# Patient Record
Sex: FEMALE | Race: WHITE | NOT HISPANIC OR LATINO | Employment: OTHER | URBAN - METROPOLITAN AREA
[De-identification: names, ages, dates, MRNs, and addresses within clinical notes are randomized per-mention and may not be internally consistent; named-entity substitution may affect disease eponyms.]

---

## 2017-02-14 RX ORDER — HYOSCYAMINE SULFATE 0.12 MG/1
TABLET, ORALLY DISINTEGRATING SUBLINGUAL
Qty: 270 TABLET | Refills: 2 | Status: SHIPPED | OUTPATIENT
Start: 2017-02-14 | End: 2017-11-12 | Stop reason: SDUPTHER

## 2017-03-08 ENCOUNTER — OFFICE VISIT (OUTPATIENT)
Dept: UROGYNECOLOGY | Facility: CLINIC | Age: 72
End: 2017-03-08
Payer: MEDICARE

## 2017-03-08 VITALS
HEART RATE: 92 BPM | DIASTOLIC BLOOD PRESSURE: 77 MMHG | BODY MASS INDEX: 49.14 KG/M2 | HEIGHT: 63 IN | WEIGHT: 277.31 LBS | SYSTOLIC BLOOD PRESSURE: 146 MMHG

## 2017-03-08 DIAGNOSIS — Z46.89 PESSARY MAINTENANCE: ICD-10-CM

## 2017-03-08 DIAGNOSIS — N81.11 MIDLINE CYSTOCELE: ICD-10-CM

## 2017-03-08 DIAGNOSIS — N30.10 IC (INTERSTITIAL CYSTITIS): Primary | ICD-10-CM

## 2017-03-08 DIAGNOSIS — N95.2 ATROPHIC VAGINITIS: ICD-10-CM

## 2017-03-08 LAB
BILIRUB SERPL-MCNC: NORMAL MG/DL
BLOOD URINE, POC: NORMAL
COLOR, POC UA: YELLOW
GLUCOSE UR QL STRIP: NORMAL
KETONES UR QL STRIP: NORMAL
LEUKOCYTE ESTERASE URINE, POC: NORMAL
NITRITE, POC UA: NORMAL
PH, POC UA: 5
PROTEIN, POC: NORMAL
SPECIFIC GRAVITY, POC UA: 1
UROBILINOGEN, POC UA: NORMAL

## 2017-03-08 PROCEDURE — 99213 OFFICE O/P EST LOW 20 MIN: CPT | Mod: S$PBB,,, | Performed by: NURSE PRACTITIONER

## 2017-03-08 PROCEDURE — 81002 URINALYSIS NONAUTO W/O SCOPE: CPT | Mod: PBBFAC,PO | Performed by: NURSE PRACTITIONER

## 2017-03-08 PROCEDURE — 99999 PR PBB SHADOW E&M-EST. PATIENT-LVL IV: CPT | Mod: PBBFAC,,, | Performed by: NURSE PRACTITIONER

## 2017-03-08 PROCEDURE — 99214 OFFICE O/P EST MOD 30 MIN: CPT | Mod: PBBFAC,PO | Performed by: NURSE PRACTITIONER

## 2017-03-08 RX ORDER — FUROSEMIDE 20 MG/1
20 TABLET ORAL
COMMUNITY
Start: 2017-02-06 | End: 2018-06-04

## 2017-03-08 NOTE — PROGRESS NOTES
Subjective:       Patient ID: Fransisca Frazier is a 71 y.o. female.    Chief Complaint: E-String    HPI  Fransisca Frazier is a 71 y.o. female who presents today for estring change.  She has done well the past few months.  She denies any real complaints/concerns at this time.  She feels that the estring is doing well for her.  She denies any real flares on her I.C.  Overall, she is happy with her status.    Review of Systems   Constitutional: Negative for activity change, fever and unexpected weight change.   HENT: Negative for hearing loss.    Eyes: Negative for visual disturbance.   Respiratory: Negative for shortness of breath and wheezing.    Cardiovascular: Negative for chest pain, palpitations and leg swelling.   Gastrointestinal: Negative for abdominal pain, constipation and diarrhea.   Genitourinary: Negative for dyspareunia, dysuria, frequency, urgency, vaginal bleeding and vaginal discharge.   Musculoskeletal: Negative for gait problem and neck pain.   Skin: Negative for rash and wound.   Allergic/Immunologic: Negative for immunocompromised state.   Neurological: Negative for tremors, speech difficulty and weakness.   Hematological: Does not bruise/bleed easily.   Psychiatric/Behavioral: Negative for agitation and confusion.       Objective:      Physical Exam   Constitutional: She is oriented to person, place, and time. She appears well-developed and well-nourished. No distress.   HENT:   Head: Normocephalic and atraumatic.   Neck: Neck supple. No thyromegaly present.   Pulmonary/Chest: Effort normal. No respiratory distress.   Abdominal: Soft. There is no tenderness. No hernia.   Musculoskeletal: Normal range of motion.   Neurological: She is alert and oriented to person, place, and time.   Skin: Skin is warm and dry. No rash noted.   Psychiatric: She has a normal mood and affect. Her behavior is normal.     Pelvic Exam:  V: No lesions. No palpable nodes.   Va: no discharge or bleeding.  Good length.   Midline cystocele Ba=-1  Meatus:No caruncle or stenosis  Urethra: Non tender. No suburethral masses.  Cx/Cuff: Normal   Uterus: small, non-tender  Ad: No mass or tenderness.  Levators :Symmetrical. Normal tone. Non tender.  BL: Non tender  RV: No hemorrhoids.      Assessment:       1. IC (interstitial cystitis)    2. Midline cystocele    3. Atrophic vaginitis    4. Pessary maintenance          Procedure note- estring removed with forceps and discarded.  After betadine irrigation of the vagina, a new estring that the pt provided, was inserted into the vagina.  Pt ambulated in the room and denies any discomfort.  NP reminded pt that the first 24-48 hours are the most likely time for the pessary to fall out and to monitor the toilet before flushing.  Pt verbalized understanding.      Plan:       IC (interstitial cystitis)- monitor  -     POCT urine dipstick without microscope    Midline cystocele- continue with estring    Atrophic vaginitis- continue with estring    Pessary maintenance- continue with estring    RTC 3 months

## 2017-06-08 ENCOUNTER — OFFICE VISIT (OUTPATIENT)
Dept: UROGYNECOLOGY | Facility: CLINIC | Age: 72
End: 2017-06-08
Payer: MEDICARE

## 2017-06-08 VITALS
TEMPERATURE: 98 F | DIASTOLIC BLOOD PRESSURE: 89 MMHG | BODY MASS INDEX: 48.9 KG/M2 | WEIGHT: 276 LBS | HEART RATE: 89 BPM | SYSTOLIC BLOOD PRESSURE: 149 MMHG | HEIGHT: 63 IN

## 2017-06-08 DIAGNOSIS — Z46.89 PESSARY MAINTENANCE: ICD-10-CM

## 2017-06-08 DIAGNOSIS — N30.10 IC (INTERSTITIAL CYSTITIS): ICD-10-CM

## 2017-06-08 DIAGNOSIS — R35.0 URINARY FREQUENCY: Primary | ICD-10-CM

## 2017-06-08 DIAGNOSIS — N81.11 MIDLINE CYSTOCELE: ICD-10-CM

## 2017-06-08 DIAGNOSIS — N95.2 ATROPHIC VAGINITIS: ICD-10-CM

## 2017-06-08 LAB
BILIRUB SERPL-MCNC: NORMAL MG/DL
BLOOD URINE, POC: NORMAL
COLOR, POC UA: YELLOW
GLUCOSE UR QL STRIP: NORMAL
KETONES UR QL STRIP: NORMAL
LEUKOCYTE ESTERASE URINE, POC: NORMAL
NITRITE, POC UA: NORMAL
PH, POC UA: 6
PROTEIN, POC: NORMAL
SPECIFIC GRAVITY, POC UA: 1.01
UROBILINOGEN, POC UA: NORMAL

## 2017-06-08 PROCEDURE — 1159F MED LIST DOCD IN RCRD: CPT | Mod: ,,, | Performed by: NURSE PRACTITIONER

## 2017-06-08 PROCEDURE — 1125F AMNT PAIN NOTED PAIN PRSNT: CPT | Mod: ,,, | Performed by: NURSE PRACTITIONER

## 2017-06-08 PROCEDURE — 81002 URINALYSIS NONAUTO W/O SCOPE: CPT | Mod: PBBFAC,PO | Performed by: NURSE PRACTITIONER

## 2017-06-08 PROCEDURE — 99214 OFFICE O/P EST MOD 30 MIN: CPT | Mod: PBBFAC,PO | Performed by: NURSE PRACTITIONER

## 2017-06-08 PROCEDURE — 99213 OFFICE O/P EST LOW 20 MIN: CPT | Mod: S$PBB,,, | Performed by: NURSE PRACTITIONER

## 2017-06-08 PROCEDURE — 99999 PR PBB SHADOW E&M-EST. PATIENT-LVL IV: CPT | Mod: PBBFAC,,, | Performed by: NURSE PRACTITIONER

## 2017-06-08 RX ORDER — AMOXICILLIN AND CLAVULANATE POTASSIUM 875; 125 MG/1; MG/1
1 TABLET, FILM COATED ORAL 2 TIMES DAILY
Qty: 20 TABLET | Refills: 0 | Status: SHIPPED | OUTPATIENT
Start: 2017-06-08 | End: 2017-06-18

## 2017-06-08 RX ORDER — FLUCONAZOLE 150 MG/1
150 TABLET ORAL DAILY
Qty: 2 TABLET | Refills: 1 | Status: SHIPPED | OUTPATIENT
Start: 2017-06-08 | End: 2017-06-09

## 2017-06-08 NOTE — PROGRESS NOTES
Subjective:       Patient ID: Fransisca Frazier is a 71 y.o. female.    Chief Complaint: E string    HPI  Fransisca Frazier is a 71 y.o. female who presents today for estring change.  She feels that she is doing well with the estring.  She denies any vaginal discharge, bleeding or bulging.  She feels that her urinary symptoms are within normal limits and that she is doing well.  She is getting ready to go on vacation and she would like to have antibiotics with her because she states she always gets a UTI on vacation.  She is not able to clean herself as well and she would like when she is on vacation and she believes this is the cause of the UTI.  She denies any other complaints/concerns at this time.    Review of Systems   Constitutional: Negative for activity change, fever and unexpected weight change.   HENT: Negative for hearing loss.    Eyes: Negative for visual disturbance.   Respiratory: Negative for shortness of breath and wheezing.    Cardiovascular: Negative for chest pain, palpitations and leg swelling.   Gastrointestinal: Negative for abdominal pain, constipation and diarrhea.   Genitourinary: Negative for dyspareunia, dysuria, frequency, urgency, vaginal bleeding and vaginal discharge.   Musculoskeletal: Negative for gait problem and neck pain.   Skin: Negative for rash and wound.   Allergic/Immunologic: Negative for immunocompromised state.   Neurological: Negative for tremors, speech difficulty and weakness.   Hematological: Does not bruise/bleed easily.   Psychiatric/Behavioral: Negative for agitation and confusion.       Objective:      Physical Exam   Constitutional: She is oriented to person, place, and time. She appears well-developed and well-nourished. No distress.   HENT:   Head: Normocephalic and atraumatic.   Neck: Neck supple. No thyromegaly present.   Pulmonary/Chest: Effort normal. No respiratory distress.   Abdominal: Soft. There is no tenderness. No hernia.   Musculoskeletal: Normal range  of motion.   Neurological: She is alert and oriented to person, place, and time.   Skin: Skin is warm and dry. No rash noted.   Psychiatric: She has a normal mood and affect. Her behavior is normal.     Pelvic Exam:  V: No lesions. No palpable nodes.   Va: no discharge or bleeding.  Good length, midline cystocele Ba=-1  Meatus:No caruncle or stenosis  Urethra: Non tender. No suburethral masses.  Cx/Cuff: Normal   Uterus: small, non-tender  Ad: No mass or tenderness.  Levators :Symmetrical. Normal tone. Non tender.  BL: Non tender  RV: No hemorrhoids.      Assessment:       1. Urinary frequency    2. IC (interstitial cystitis)    3. Midline cystocele    4. Atrophic vaginitis    5. Pessary maintenance          Procedure note- estring pessary removed with forceps and discarded.  After betadine irrigation of the vagina, a new estring, which pt provided, was reinserted into the vagina.  Pt ambulated in the room and denies any discomfort.  NP reminded pt that the first 24-48 hours are the most likely time for the pessary to fall out and to monitor the toilet before flushing.  Pt verbalized understanding.      Plan:       Urinary frequency- we will send augmentin as noted below for prophylactic UTI for vacation.  Pt also requesting diflucan which was sent as noted below  -     POCT URINE DIPSTICK WITHOUT MICROSCOPE    IC (interstitial cystitis)- doing well at this time    Midline cystocele- continue with estring    Atrophic vaginitis- continue with estring    Pessary maintenance- continue with estring    Other orders  -     amoxicillin-clavulanate 875-125mg (AUGMENTIN) 875-125 mg per tablet; Take 1 tablet by mouth 2 (two) times daily.  Dispense: 20 tablet; Refill: 0  -     fluconazole (DIFLUCAN) 150 MG Tab; Take 1 tablet (150 mg total) by mouth once daily. May repeat in three days if no relief.  Dispense: 2 tablet; Refill: 1    RTC 3 month

## 2017-07-21 DIAGNOSIS — N30.10 INTERSTITIAL CYSTITIS: ICD-10-CM

## 2017-08-15 RX ORDER — PENTOSAN POLYSULFATE SODIUM 100 MG/1
CAPSULE, GELATIN COATED ORAL
Qty: 270 CAPSULE | Refills: 2 | Status: SHIPPED | OUTPATIENT
Start: 2017-08-15 | End: 2018-01-23 | Stop reason: SDUPTHER

## 2017-09-05 ENCOUNTER — TELEPHONE (OUTPATIENT)
Dept: UROGYNECOLOGY | Facility: CLINIC | Age: 72
End: 2017-09-05

## 2017-09-05 ENCOUNTER — OFFICE VISIT (OUTPATIENT)
Dept: UROGYNECOLOGY | Facility: CLINIC | Age: 72
End: 2017-09-05
Payer: MEDICARE

## 2017-09-05 VITALS
SYSTOLIC BLOOD PRESSURE: 180 MMHG | HEART RATE: 101 BPM | HEIGHT: 63 IN | WEIGHT: 276.69 LBS | BODY MASS INDEX: 49.02 KG/M2 | DIASTOLIC BLOOD PRESSURE: 86 MMHG | TEMPERATURE: 99 F

## 2017-09-05 DIAGNOSIS — Z87.440 HISTORY OF RECURRENT UTIS: ICD-10-CM

## 2017-09-05 DIAGNOSIS — N95.2 ATROPHIC VAGINITIS: ICD-10-CM

## 2017-09-05 DIAGNOSIS — R30.0 DYSURIA: ICD-10-CM

## 2017-09-05 DIAGNOSIS — N81.11 MIDLINE CYSTOCELE: Primary | ICD-10-CM

## 2017-09-05 LAB
BILIRUB SERPL-MCNC: ABNORMAL MG/DL
BLOOD URINE, POC: 250
COLOR, POC UA: YELLOW
GLUCOSE UR QL STRIP: ABNORMAL
KETONES UR QL STRIP: ABNORMAL
LEUKOCYTE ESTERASE URINE, POC: ABNORMAL
NITRITE, POC UA: ABNORMAL
PH, POC UA: 5
PROTEIN, POC: ABNORMAL
SPECIFIC GRAVITY, POC UA: 1.01
UROBILINOGEN, POC UA: ABNORMAL

## 2017-09-05 PROCEDURE — 1159F MED LIST DOCD IN RCRD: CPT | Mod: ,,, | Performed by: NURSE PRACTITIONER

## 2017-09-05 PROCEDURE — 99999 PR PBB SHADOW E&M-EST. PATIENT-LVL IV: CPT | Mod: PBBFAC,,, | Performed by: NURSE PRACTITIONER

## 2017-09-05 PROCEDURE — 1125F AMNT PAIN NOTED PAIN PRSNT: CPT | Mod: ,,, | Performed by: NURSE PRACTITIONER

## 2017-09-05 PROCEDURE — 99213 OFFICE O/P EST LOW 20 MIN: CPT | Mod: S$PBB,,, | Performed by: NURSE PRACTITIONER

## 2017-09-05 PROCEDURE — 81002 URINALYSIS NONAUTO W/O SCOPE: CPT | Mod: PBBFAC,PO | Performed by: NURSE PRACTITIONER

## 2017-09-05 PROCEDURE — 87086 URINE CULTURE/COLONY COUNT: CPT

## 2017-09-05 PROCEDURE — 87186 SC STD MICRODIL/AGAR DIL: CPT

## 2017-09-05 PROCEDURE — 87077 CULTURE AEROBIC IDENTIFY: CPT

## 2017-09-05 PROCEDURE — 87088 URINE BACTERIA CULTURE: CPT

## 2017-09-05 PROCEDURE — 99214 OFFICE O/P EST MOD 30 MIN: CPT | Mod: PBBFAC,PO | Performed by: NURSE PRACTITIONER

## 2017-09-05 RX ORDER — FLUCONAZOLE 150 MG/1
150 TABLET ORAL DAILY
Qty: 2 TABLET | Refills: 1 | Status: SHIPPED | OUTPATIENT
Start: 2017-09-05 | End: 2017-09-06

## 2017-09-05 RX ORDER — AMOXICILLIN AND CLAVULANATE POTASSIUM 875; 125 MG/1; MG/1
1 TABLET, FILM COATED ORAL 2 TIMES DAILY
Qty: 10 TABLET | Refills: 0 | Status: SHIPPED | OUTPATIENT
Start: 2017-09-05 | End: 2018-01-23 | Stop reason: ALTCHOICE

## 2017-09-05 NOTE — TELEPHONE ENCOUNTER
----- Message from Criss Hernandez sent at 9/5/2017  3:12 PM CDT -----  Contact: duc //[phone]//     Placed a call  To pod //  Pt is  At the  pharmacy ,  Calling  About  The  Amoxicillin  Clav//

## 2017-09-05 NOTE — TELEPHONE ENCOUNTER
Spoke with Jose at [facility] pharmacy who states medication has been picked up by patient already

## 2017-09-05 NOTE — PROGRESS NOTES
Subjective:       Patient ID: Fransisca Frazier is a 71 y.o. female.    Chief Complaint: E-string    HPI  Fransisca Frazier is a 71 y.o. female who presents today for estring change.  She has been using the estring for awhile and feels that it works fairly well for her.  She denies any vaginal complaints.  She does however, feel that she has a UTI.  Since last Wednesday she has been having a foul smell to her urine and her urine has been cloudy.  She has some dysuria and increased frequency and urgency.  She is also noticing some urge incontinence.  She has not been drinking much water today, but will work on increasing her water intake.  She denies any fevers, N/VD or new back pain.    Review of Systems   Constitutional: Negative for activity change, fever and unexpected weight change.   HENT: Negative for hearing loss.    Eyes: Negative for visual disturbance.   Respiratory: Negative for shortness of breath and wheezing.    Cardiovascular: Negative for chest pain, palpitations and leg swelling.   Gastrointestinal: Negative for abdominal pain, constipation and diarrhea.   Genitourinary: Positive for dysuria, frequency and urgency. Negative for dyspareunia, vaginal bleeding and vaginal discharge.   Musculoskeletal: Negative for gait problem and neck pain.   Skin: Negative for rash and wound.   Allergic/Immunologic: Negative for immunocompromised state.   Neurological: Negative for tremors, speech difficulty and weakness.   Hematological: Does not bruise/bleed easily.   Psychiatric/Behavioral: Negative for agitation and confusion.       Objective:      Physical Exam   Constitutional: She is oriented to person, place, and time. She appears well-developed and well-nourished. No distress.   HENT:   Head: Normocephalic and atraumatic.   Neck: Neck supple. No thyromegaly present.   Pulmonary/Chest: Effort normal. No respiratory distress.   Abdominal: Soft. There is no tenderness. There is no CVA tenderness. No hernia.    Musculoskeletal: Normal range of motion.   Neurological: She is alert and oriented to person, place, and time.   Skin: Skin is warm and dry. No rash noted.   Psychiatric: She has a normal mood and affect. Her behavior is normal.     Pelvic Exam:  V: No lesions. No palpable nodes.   Va: no discharge or bleeding.  Good length.  Mild midline cystocele Ba=-2  Meatus:No caruncle or stenosis  Urethra: Non tender. No suburethral masses.  Cx/Cuff: Normal   Uterus: small, non-tender  Ad: No mass or tenderness.  Levators :Symmetrical. Normal tone. Non tender.  BL: Non tender  RV: No hemorrhoids.      Assessment:       1. Midline cystocele    2. Atrophic vaginitis    3. Dysuria    4. History of recurrent UTIs          Procedure note- estring pessary removed and discarded.  After betadine irrigation of the vagina, a new estring pessary, which the pt provided was inserted into the vagina.  Pt ambulated in the room and denies any discomfort.  NP reminded pt that the first 24-48 hours are the most likely time for the pessary to fall out and to monitor the toilet before flushing.  Pt verbalized understanding.      Plan:       Midline cystocele- continue with estring    Atrophic vaginitis- continue with estring  -     estradiol (ESTRING) 2 mg (7.5 mcg /24 hour) vaginal ring; Place 2 mg vaginally every 3 (three) months.  Dispense: 1 each; Refill: 3    Dysuria- as noted below  -     POCT urine dipstick without microscope  -     Urine culture  -     amoxicillin-clavulanate 875-125mg (AUGMENTIN) 875-125 mg per tablet; Take 1 tablet by mouth 2 (two) times daily.  Dispense: 10 tablet; Refill: 0    History of recurrent UTIs- monitor  -     estradiol (ESTRING) 2 mg (7.5 mcg /24 hour) vaginal ring; Place 2 mg vaginally every 3 (three) months.  Dispense: 1 each; Refill: 3    Other orders- pt requesting for after antibiotic use  -     fluconazole (DIFLUCAN) 150 MG Tab; Take 1 tablet (150 mg total) by mouth once daily. May repeat in three  days if no relief.  Dispense: 2 tablet; Refill: 1    RTC 3 mo or PRN

## 2017-09-08 LAB — BACTERIA UR CULT: NORMAL

## 2017-11-14 RX ORDER — HYOSCYAMINE SULFATE 0.12 MG/1
TABLET, ORALLY DISINTEGRATING SUBLINGUAL
Qty: 270 TABLET | Refills: 2 | Status: SHIPPED | OUTPATIENT
Start: 2017-11-14 | End: 2018-06-04 | Stop reason: SDUPTHER

## 2017-12-07 ENCOUNTER — OFFICE VISIT (OUTPATIENT)
Dept: UROGYNECOLOGY | Facility: CLINIC | Age: 72
End: 2017-12-07
Payer: MEDICARE

## 2017-12-07 VITALS
DIASTOLIC BLOOD PRESSURE: 81 MMHG | WEIGHT: 273.56 LBS | BODY MASS INDEX: 48.47 KG/M2 | HEIGHT: 63 IN | SYSTOLIC BLOOD PRESSURE: 156 MMHG | RESPIRATION RATE: 18 BRPM | HEART RATE: 108 BPM | TEMPERATURE: 98 F

## 2017-12-07 DIAGNOSIS — R39.15 URINARY URGENCY: ICD-10-CM

## 2017-12-07 DIAGNOSIS — Z46.89 PESSARY MAINTENANCE: ICD-10-CM

## 2017-12-07 DIAGNOSIS — N95.2 ATROPHIC VAGINITIS: ICD-10-CM

## 2017-12-07 DIAGNOSIS — N81.11 CYSTOCELE, MIDLINE: ICD-10-CM

## 2017-12-07 DIAGNOSIS — R35.0 URINARY FREQUENCY: ICD-10-CM

## 2017-12-07 DIAGNOSIS — N30.10 IC (INTERSTITIAL CYSTITIS): ICD-10-CM

## 2017-12-07 DIAGNOSIS — R30.0 DYSURIA: Primary | ICD-10-CM

## 2017-12-07 LAB
BILIRUB SERPL-MCNC: ABNORMAL MG/DL
BLOOD URINE, POC: ABNORMAL
COLOR, POC UA: ABNORMAL
GLUCOSE UR QL STRIP: ABNORMAL
KETONES UR QL STRIP: ABNORMAL
LEUKOCYTE ESTERASE URINE, POC: ABNORMAL
NITRITE, POC UA: ABNORMAL
PH, POC UA: 5
PROTEIN, POC: ABNORMAL
SPECIFIC GRAVITY, POC UA: 1.01
UROBILINOGEN, POC UA: ABNORMAL

## 2017-12-07 PROCEDURE — 87186 SC STD MICRODIL/AGAR DIL: CPT

## 2017-12-07 PROCEDURE — 99215 OFFICE O/P EST HI 40 MIN: CPT | Mod: PBBFAC,PO | Performed by: NURSE PRACTITIONER

## 2017-12-07 PROCEDURE — 99999 PR PBB SHADOW E&M-EST. PATIENT-LVL V: CPT | Mod: PBBFAC,,, | Performed by: NURSE PRACTITIONER

## 2017-12-07 PROCEDURE — 81002 URINALYSIS NONAUTO W/O SCOPE: CPT | Mod: PBBFAC,PO | Performed by: NURSE PRACTITIONER

## 2017-12-07 PROCEDURE — 99213 OFFICE O/P EST LOW 20 MIN: CPT | Mod: S$PBB,,, | Performed by: NURSE PRACTITIONER

## 2017-12-07 PROCEDURE — 87086 URINE CULTURE/COLONY COUNT: CPT

## 2017-12-07 PROCEDURE — 87077 CULTURE AEROBIC IDENTIFY: CPT

## 2017-12-07 PROCEDURE — 87088 URINE BACTERIA CULTURE: CPT

## 2017-12-07 RX ORDER — VALSARTAN 160 MG/1
160 TABLET ORAL DAILY
COMMUNITY
Start: 2017-11-26 | End: 2018-06-04 | Stop reason: SDUPTHER

## 2017-12-07 RX ORDER — CIPROFLOXACIN 500 MG/1
500 TABLET ORAL 2 TIMES DAILY
Qty: 10 TABLET | Refills: 0 | Status: SHIPPED | OUTPATIENT
Start: 2017-12-07 | End: 2017-12-12

## 2017-12-07 RX ORDER — PANTOPRAZOLE SODIUM 40 MG/1
40 TABLET, DELAYED RELEASE ORAL DAILY
COMMUNITY
Start: 2017-11-03 | End: 2018-06-04 | Stop reason: SDUPTHER

## 2017-12-07 NOTE — PROGRESS NOTES
Subjective:       Patient ID: Fransisca Frazier is a 72 y.o. female.    Chief Complaint:estring    HPI  Fransisca Frazier is a 72 y.o. female who presents today for estring change.  She feels that she is doing well with the estring and would like to continue to use it.  She is concerned about her UTI's.  She was seen on 09/05/17 and had a positive urine culture for Klebsiella pneumonia.  She was treated and felt better.  She went to see her PCP and based on UA in the PCP's office was told she had another UTI and was treated again for UTI in October.  She felt that she was fine in November.  However, today she feels that she might have a UTI again.  She states that it started late Wednesday with burning, increased frequency/urgency and the urine has a horrible smell.  She denies any back pain, N/V/D, or fevers.  She does everything she can think of to help prevent these infections.  She washes her hands before wiping after urinating.  She uses a separate bar of soap in the shower only for the vaginal area.  She even wears gloves in the shower to wash her buttocks and then removes the gloves before washing anywhere else.  She is not sure what else can be done about the infections.    Review of Systems   Constitutional: Negative for activity change, fever and unexpected weight change.   HENT: Negative for hearing loss.    Eyes: Negative for visual disturbance.   Respiratory: Negative for shortness of breath and wheezing.    Cardiovascular: Negative for chest pain, palpitations and leg swelling.   Gastrointestinal: Positive for abdominal pain. Negative for constipation and diarrhea.   Genitourinary: Positive for dysuria, frequency and urgency. Negative for dyspareunia, vaginal bleeding and vaginal discharge.   Musculoskeletal: Negative for gait problem and neck pain.   Skin: Negative for rash and wound.   Allergic/Immunologic: Negative for immunocompromised state.   Neurological: Negative for tremors, speech difficulty and  weakness.   Hematological: Does not bruise/bleed easily.   Psychiatric/Behavioral: Negative for agitation and confusion.       Objective:      Physical Exam   Constitutional: She is oriented to person, place, and time. She appears well-developed and well-nourished. No distress.   HENT:   Head: Normocephalic and atraumatic.   Neck: Neck supple. No thyromegaly present.   Pulmonary/Chest: Effort normal. No respiratory distress.   Abdominal: Soft. There is tenderness in the suprapubic area. There is no CVA tenderness. No hernia.   Musculoskeletal: Normal range of motion.   Neurological: She is alert and oriented to person, place, and time.   Skin: Skin is warm and dry. No rash noted.   Psychiatric: She has a normal mood and affect. Her behavior is normal.     Pelvic Exam:  V: No lesions. No palpable nodes.   Va: no discharge or bleeding.  Good length.  Midline cystocele Ba=-1  Meatus:No caruncle or stenosis  Urethra: Non tender. No suburethral masses.  Cx/Cuff: Normal   Uterus: small, non-tender  Ad: No mass or tenderness.  Levators :Symmetrical. Normal tone. Non tender.  BL: Non tender  RV: No hemorrhoids.      Assessment:       1. Dysuria    2. Urinary frequency    3. Urinary urgency    4. IC (interstitial cystitis)    5. Cystocele, midline    6. Pessary maintenance    7. Atrophic vaginitis          Procedure note- estring pessary removed with forceps and discarded.  After betadine irrigation of the vagina, a new estring, which the pt provided was reinserted into the vagina.  Pt ambulated in the room and denies any discomfort.  NP reminded pt that the first 24-48 hours are the most likely time for the pessary to fall out and to monitor the toilet before flushing.  Pt verbalized understanding.      Plan:       Dysuria- when the urine culture results come back we will evaluate what antibiotic would be a good choice for prophylactic antibiotic therapy.  -     POCT URINE DIPSTICK WITHOUT MICROSCOPE  -     Urine culture  -      ciprofloxacin HCl (CIPRO) 500 MG tablet; Take 1 tablet (500 mg total) by mouth 2 (two) times daily.  Dispense: 10 tablet; Refill: 0    Urinary frequency- monitor    Urinary urgency- monitor    IC (interstitial cystitis)- monitor    Cystocele, midline- continue with estring    Pessary maintenance- continue estring    Atrophic vaginitis- continue estring    rTC 3 months

## 2017-12-11 LAB — BACTERIA UR CULT: NORMAL

## 2017-12-12 DIAGNOSIS — R30.0 DYSURIA: Primary | ICD-10-CM

## 2017-12-12 RX ORDER — SULFAMETHOXAZOLE AND TRIMETHOPRIM 400; 80 MG/1; MG/1
1 TABLET ORAL DAILY
Qty: 30 TABLET | Refills: 1 | Status: SHIPPED | OUTPATIENT
Start: 2017-12-12 | End: 2018-02-06

## 2017-12-12 RX ORDER — FLUCONAZOLE 150 MG/1
150 TABLET ORAL DAILY
Qty: 2 TABLET | Refills: 1 | Status: SHIPPED | OUTPATIENT
Start: 2017-12-12 | End: 2017-12-13

## 2017-12-12 RX ORDER — PHENAZOPYRIDINE HYDROCHLORIDE 200 MG/1
200 TABLET, FILM COATED ORAL 3 TIMES DAILY PRN
Qty: 90 TABLET | Refills: 1 | Status: SHIPPED | OUTPATIENT
Start: 2017-12-12 | End: 2018-01-11

## 2017-12-12 NOTE — TELEPHONE ENCOUNTER
I will send in the pyridium.  I will also order low dose bactrim for her to take daily for 30 days.  Does she want me to [facility] a local [facility]?   I would like for her to repeat the urine culture so that we know the infection has cleared.  I have also put an order in for repeat urine culture.  She can go to the lab and drop off a specimen at her convenience.

## 2017-12-12 NOTE — TELEPHONE ENCOUNTER
Pt is also requesting a rx  For diflucan as she gets yeast infections when she takes abx. Please advise  Pt is requesting all meds be sent to local pharmacy. Please advise.

## 2017-12-12 NOTE — TELEPHONE ENCOUNTER
Pt advised of results. Pt states that she is still not feeling well. Pt is requesting pyridium be sent to her pharmacy to help with the still there burning. Pt states that she is a little better, but she is still not well.

## 2017-12-12 NOTE — TELEPHONE ENCOUNTER
----- Message from CONCHA Diane sent at 12/12/2017  8:21 AM CST -----  Her urine culture came back positive for Klebsiella pneumoniae.  It is sensitive to the cipro that we started at the visit.  How is she feeling?  If she is feeling better I would recommend that we do prophylactic antibiotic therapy with trimethoprim for at least a month.

## 2018-01-22 PROBLEM — K21.9 GASTROESOPHAGEAL REFLUX DISEASE WITHOUT ESOPHAGITIS: Status: ACTIVE | Noted: 2018-01-22

## 2018-01-22 PROBLEM — E83.52 HYPERCALCEMIA: Status: ACTIVE | Noted: 2018-01-22

## 2018-01-22 PROBLEM — M85.89 OSTEOPENIA OF MULTIPLE SITES: Status: ACTIVE | Noted: 2018-01-22

## 2018-01-23 ENCOUNTER — OFFICE VISIT (OUTPATIENT)
Dept: INTERNAL MEDICINE | Facility: CLINIC | Age: 73
End: 2018-01-23
Payer: MEDICARE

## 2018-01-23 VITALS
SYSTOLIC BLOOD PRESSURE: 140 MMHG | TEMPERATURE: 97 F | HEART RATE: 78 BPM | RESPIRATION RATE: 20 BRPM | OXYGEN SATURATION: 97 % | HEIGHT: 63 IN | DIASTOLIC BLOOD PRESSURE: 66 MMHG | WEIGHT: 276 LBS | BODY MASS INDEX: 48.9 KG/M2

## 2018-01-23 DIAGNOSIS — J40 BRONCHITIS: ICD-10-CM

## 2018-01-23 DIAGNOSIS — E83.52 HYPERCALCEMIA: ICD-10-CM

## 2018-01-23 DIAGNOSIS — K21.9 GASTROESOPHAGEAL REFLUX DISEASE WITHOUT ESOPHAGITIS: ICD-10-CM

## 2018-01-23 DIAGNOSIS — N39.0 UTI DUE TO KLEBSIELLA SPECIES: ICD-10-CM

## 2018-01-23 DIAGNOSIS — M85.89 OSTEOPENIA OF MULTIPLE SITES: ICD-10-CM

## 2018-01-23 DIAGNOSIS — B96.89 UTI DUE TO KLEBSIELLA SPECIES: ICD-10-CM

## 2018-01-23 DIAGNOSIS — N30.10 INTERSTITIAL CYSTITIS: ICD-10-CM

## 2018-01-23 DIAGNOSIS — N18.30 CKD (CHRONIC KIDNEY DISEASE) STAGE 3, GFR 30-59 ML/MIN: Primary | ICD-10-CM

## 2018-01-23 PROCEDURE — 99214 OFFICE O/P EST MOD 30 MIN: CPT | Mod: ,,, | Performed by: INTERNAL MEDICINE

## 2018-01-23 RX ORDER — AMITRIPTYLINE HYDROCHLORIDE 25 MG/1
25 TABLET, FILM COATED ORAL 3 TIMES DAILY
Qty: 270 TABLET | Refills: 1 | Status: SHIPPED | OUTPATIENT
Start: 2018-01-23 | End: 2018-02-06 | Stop reason: SDUPTHER

## 2018-01-23 RX ORDER — DOXYCYCLINE 100 MG/1
100 CAPSULE ORAL 2 TIMES DAILY
Qty: 20 CAPSULE | Refills: 0 | Status: SHIPPED | OUTPATIENT
Start: 2018-01-23 | End: 2018-02-09

## 2018-01-23 RX ORDER — BENZONATATE 100 MG/1
100 CAPSULE ORAL 3 TIMES DAILY PRN
Qty: 30 CAPSULE | Refills: 0 | Status: SHIPPED | OUTPATIENT
Start: 2018-01-23 | End: 2018-01-25 | Stop reason: SDUPTHER

## 2018-01-23 NOTE — PROGRESS NOTES
SUBJECTIVE:    Patient ID: Fransisca Frazier is a 72 y.o. female.    Chief Complaint: Follow-up (2 months)    HPI     BEGINNING OF THE FOURTH WEEK OF A COLD--HAD FLU SHOT ON A FRIDAY AND THREE DAYS LATER SHE AWAKENED WITH CHEST CONGESTION, FEVER .2-MINIMAL PRODUCTIVE COUGH -CONTINUES WITH THE COUGH-TODAY HAS BEEN A GOOD DAY BUT THE COUGH HAS CONTINUED AND SOUNDS CROUPY-COUGHING MAKES HER LIGHT-HEADED-SAYS THREE TIMES SHE HAD BRONCHOSPASMS AND COULD HARDLY BREATH    BLOOD SUGARS--GOOD MINIMAL ELEVATION    BP'S ACCEPTABLE        Past Medical History:   Diagnosis Date    Cystitis, interstitial     Hypertension      Social History     Social History    Marital status:      Spouse name: N/A    Number of children: N/A    Years of education: N/A     Occupational History    Not on file.     Social History Main Topics    Smoking status: Former Smoker     Packs/day: 1.00     Years: 32.00     Quit date: 8/12/1993    Smokeless tobacco: Never Used    Alcohol use Yes      Comment: occasional    Drug use: No    Sexual activity: Not on file     Other Topics Concern    Not on file     Social History Narrative    No narrative on file     Past Surgical History:   Procedure Laterality Date    CYSTOSCOPY      D & C      DILATION AND CURETTAGE OF UTERUS       Family History   Problem Relation Age of Onset    Cancer Father     Cancer Sister     Cancer Brother     Cancer Brother        Review of Systems   Constitutional: Positive for fever (WITH RTI). Negative for appetite change, chills, diaphoresis, fatigue and unexpected weight change.   HENT: Negative for congestion, ear pain, hearing loss, nosebleeds, postnasal drip, sinus pain, sinus pressure, sneezing, sore throat, tinnitus, trouble swallowing and voice change.         EAR CONGESTION ON THE RIGHT   Eyes: Negative for photophobia, pain, itching and visual disturbance.   Respiratory: Positive for cough. Negative for apnea, chest tightness, wheezing and  stridor.    Cardiovascular: Negative for chest pain, palpitations and leg swelling.   Gastrointestinal: Negative for abdominal distention, abdominal pain, blood in stool, constipation, diarrhea, nausea and vomiting.        FETTUCINI LED TO INDIGESTION AND RUQ PAIN-GB DYSFUNCTION   Endocrine: Negative for cold intolerance, heat intolerance, polydipsia and polyuria.   Genitourinary: Positive for pelvic pain. Negative for difficulty urinating, dyspareunia, dysuria, flank pain, frequency, hematuria, menstrual problem, urgency, vaginal discharge and vaginal pain.        KLEBSIELLA UTI TWICE NOW-KNOWS SHE STILL HAS IT-ON MAINTENANCE DOSE OF BACTRIM   Musculoskeletal: Positive for back pain (SIGNIFICANT BACK PAIN-CANT WALK FAR-SEEMS THE BACK FREEZES UP AND SHE CANNOT WALK ANYMORE-HAS TO SIT IN A SHOWER CHAIR TO COMPLETE TAKING A SHOWER). Negative for arthralgias, joint swelling, myalgias, neck pain and neck stiffness.        HAS HAD A GROIN  SPASM WITH COUGHING   Skin: Negative for pallor.   Allergic/Immunologic: Negative for environmental allergies and food allergies.   Neurological: Negative for dizziness, tremors, speech difficulty, weakness, light-headedness and numbness.   Hematological: Does not bruise/bleed easily.   Psychiatric/Behavioral: Negative for agitation, confusion, decreased concentration, sleep disturbance and suicidal ideas. The patient is not nervous/anxious.           Objective:      Physical Exam   Constitutional: She is oriented to person, place, and time. She appears well-developed and well-nourished. She is cooperative. No distress.   overweight   HENT:   Head: Normocephalic and atraumatic.   Right Ear: Tympanic membrane normal.   Left Ear: Tympanic membrane normal.   Mouth/Throat: Uvula is midline and mucous membranes are normal.   Eyes: Conjunctivae, EOM and lids are normal. Pupils are equal, round, and reactive to light. Right pupil is round and reactive. Left pupil is round and reactive.    Neck: Trachea normal and normal range of motion. Neck supple. No JVD present. No thyromegaly present.   Cardiovascular: Normal rate, regular rhythm, normal heart sounds and intact distal pulses.    Pulmonary/Chest: Effort normal. No tachypnea. No respiratory distress.   Bilateral rhonchi all lung fields   Abdominal: Soft. Bowel sounds are normal. There is no tenderness.   Musculoskeletal: Normal range of motion.   Lymphadenopathy:     She has no cervical adenopathy.   Neurological: She is alert and oriented to person, place, and time. She has normal strength.   Skin: Skin is warm and dry. No rash noted.   Psychiatric: She has a normal mood and affect. Her speech is normal.   Nursing note and vitals reviewed.    Protective Sensation (w/ 10 gram monofilament):  Right: Intact  Left: Intact    Visual Inspection:  Normal -  Bilateral    Pedal Pulses:   Right: Present  Left: Present    Posterior tibialis:   Right:Present  Left: Present      Assessment:       1. CKD (chronic kidney disease) stage 3, GFR 30-59 ml/min    2. Gastroesophageal reflux disease without esophagitis    3. Uncontrolled type 2 diabetes mellitus without complication, without long-term current use of insulin    4. Hypercalcemia    5. Osteopenia of multiple sites    6. Interstitial cystitis    7. UTI due to Klebsiella species    8. Bronchitis         Plan:           CKD (chronic kidney disease) stage 3, GFR 30-59 ml/min    Gastroesophageal reflux disease without esophagitis    Uncontrolled type 2 diabetes mellitus without complication, without long-term current use of insulin  -     Hemoglobin A1c; Future; Expected date: 01/23/2018  -     Microalbumin/creatinine urine ratio  -     Basic metabolic panel; Future; Expected date: 01/23/2018    Hypercalcemia  -     PTH, intact; Future; Expected date: 01/23/2018    Osteopenia of multiple sites    Interstitial cystitis  -     amitriptyline (ELAVIL) 25 MG tablet; Take 1 tablet (25 mg total) by mouth 3 (three)  times daily.  Dispense: 270 tablet; Refill: 1  -     pentosan polysulfate (ELMIRON) 100 mg Cap; Take 1 capsule (100 mg total) by mouth 3 (three) times daily.  Dispense: 270 capsule; Refill: 1    UTI due to Klebsiella species    Bronchitis  -     benzonatate (TESSALON) 100 MG capsule; Take 1 capsule (100 mg total) by mouth 3 (three) times daily as needed for Cough.  Dispense: 30 capsule; Refill: 0  -     doxycycline (MONODOX) 100 MG capsule; Take 1 capsule (100 mg total) by mouth 2 (two) times daily.  Dispense: 20 capsule; Refill: 0  -     indacaterol-glycopyrrolate (UTIBRON NEOHALER) 27.5-15.6 mcg CpDv; Inhale 27.5 mcg into the lungs 2 (two) times daily. Controller  Dispense: 60 capsule; Refill: 0  -     X-Ray Chest PA And Lateral

## 2018-01-24 LAB
BUN SERPL-MCNC: 20 MG/DL (ref 8–20)
CALCIUM SERPL-MCNC: 9.6 MG/DL (ref 7.7–10.4)
CHLORIDE: 105 MMOL/L (ref 98–110)
CO2 SERPL-SCNC: 26.9 MMOL/L (ref 22.8–31.6)
CREATININE: 1.45 MG/DL (ref 0.6–1.4)
GLUCOSE: 109 MG/DL (ref 70–99)
HBA1C MFR BLD: 6 % (ref 3.1–6.5)
POTASSIUM SERPL-SCNC: 4.6 MMOL/L (ref 3.5–5)
SODIUM: 137 MMOL/L (ref 134–144)

## 2018-01-25 ENCOUNTER — TELEPHONE (OUTPATIENT)
Dept: INTERNAL MEDICINE | Facility: CLINIC | Age: 73
End: 2018-01-25

## 2018-01-25 DIAGNOSIS — J40 BRONCHITIS: ICD-10-CM

## 2018-01-25 RX ORDER — BENZONATATE 100 MG/1
100 CAPSULE ORAL 3 TIMES DAILY PRN
Qty: 30 CAPSULE | Refills: 0 | Status: SHIPPED | OUTPATIENT
Start: 2018-01-25 | End: 2018-03-07 | Stop reason: ALTCHOICE

## 2018-01-26 NOTE — TELEPHONE ENCOUNTER
----- Message from Ivet Rainey sent at 1/24/2018 10:01 AM CST -----  Contact: self  Pt had an appt on 1/23/18.  Benzonatate should have been sent to [facility] and the Elmiron should have been sent to [facility].  Mr. Frazier is very upset because his wife was coughing all night and now has to wait longer to get the cough meds.

## 2018-02-05 PROBLEM — N30.10 INTERSTITIAL CYSTITIS: Chronic | Status: ACTIVE | Noted: 2018-02-05

## 2018-02-05 PROBLEM — E78.5 HYPERLIPEMIA: Status: ACTIVE | Noted: 2018-02-05

## 2018-02-05 PROBLEM — N30.01 ACUTE CYSTITIS WITH HEMATURIA: Status: ACTIVE | Noted: 2018-02-05

## 2018-02-06 ENCOUNTER — OFFICE VISIT (OUTPATIENT)
Dept: INTERNAL MEDICINE | Facility: CLINIC | Age: 73
End: 2018-02-06
Payer: MEDICARE

## 2018-02-06 VITALS
SYSTOLIC BLOOD PRESSURE: 144 MMHG | BODY MASS INDEX: 47.13 KG/M2 | OXYGEN SATURATION: 97 % | HEIGHT: 63 IN | WEIGHT: 266 LBS | DIASTOLIC BLOOD PRESSURE: 70 MMHG | RESPIRATION RATE: 20 BRPM | TEMPERATURE: 98 F | HEART RATE: 84 BPM

## 2018-02-06 DIAGNOSIS — Z87.898 H/O RIGHT FLANK PAIN: ICD-10-CM

## 2018-02-06 DIAGNOSIS — K81.9 CHOLECYSTITIS: ICD-10-CM

## 2018-02-06 DIAGNOSIS — N18.30 CHRONIC RENAL DISEASE, STAGE 3, MODERATELY DECREASED GLOMERULAR FILTRATION RATE (GFR) BETWEEN 30-59 ML/MIN/1.73 SQUARE METER: ICD-10-CM

## 2018-02-06 DIAGNOSIS — K82.9 GALLBLADDER DISEASE: ICD-10-CM

## 2018-02-06 DIAGNOSIS — N30.01 ACUTE CYSTITIS WITH HEMATURIA: ICD-10-CM

## 2018-02-06 DIAGNOSIS — N39.0 URINARY TRACT INFECTION WITH HEMATURIA, SITE UNSPECIFIED: ICD-10-CM

## 2018-02-06 DIAGNOSIS — N30.10 INTERSTITIAL CYSTITIS: Chronic | ICD-10-CM

## 2018-02-06 DIAGNOSIS — R31.9 URINARY TRACT INFECTION WITH HEMATURIA, SITE UNSPECIFIED: ICD-10-CM

## 2018-02-06 LAB
ALBUMIN SERPL-MCNC: 4.1 G/DL (ref 3.1–4.7)
ALP SERPL-CCNC: 48 IU/L (ref 40–104)
ALT (SGPT): 26 IU/L (ref 3–33)
AST SERPL-CCNC: 38 IU/L (ref 10–40)
BASOPHILS NFR BLD: 0 K/UL (ref 0–0.2)
BASOPHILS NFR BLD: 0.3 %
BILIRUB SERPL-MCNC: 0.6 MG/DL (ref 0.3–1)
BILIRUBIN DIRECT+TOT PNL SERPL-MCNC: 0.2 MG/DL (ref 0–0.2)
BUN SERPL-MCNC: 25 MG/DL (ref 8–20)
CALCIUM SERPL-MCNC: 10.2 MG/DL (ref 7.7–10.4)
CHLORIDE: 98 MMOL/L (ref 98–110)
CO2 SERPL-SCNC: 23.1 MMOL/L (ref 22.8–31.6)
CREATININE: 1.56 MG/DL (ref 0.6–1.4)
EOSINOPHIL NFR BLD: 0.2 K/UL (ref 0–0.7)
EOSINOPHIL NFR BLD: 2.6 %
ERYTHROCYTE [DISTWIDTH] IN BLOOD BY AUTOMATED COUNT: 13.2 % (ref 11.7–14.9)
GLUCOSE: 110 MG/DL (ref 70–99)
GRAN #: 4.2 K/UL (ref 1.4–6.5)
GRAN%: 58 %
HCT VFR BLD AUTO: 40.2 % (ref 36–48)
HGB BLD-MCNC: 13.3 G/DL (ref 12–15)
IMMATURE GRANS (ABS): 0 K/UL (ref 0–1)
IMMATURE GRANULOCYTES: 0.3 %
LYMPH #: 1.4 K/UL (ref 1.2–3.4)
LYMPH%: 19 %
MCH RBC QN AUTO: 32.7 PG (ref 25–35)
MCHC RBC AUTO-ENTMCNC: 33.1 G/DL (ref 31–36)
MCV RBC AUTO: 98.8 FL (ref 79–98)
MONO #: 1.4 K/UL (ref 0.1–0.6)
MONO%: 19.8 %
NUCLEATED RBCS: 0 %
PLATELET # BLD AUTO: 250 K/UL (ref 140–440)
PMV BLD AUTO: 11.1 FL (ref 8.8–12.7)
POTASSIUM SERPL-SCNC: 3.9 MMOL/L (ref 3.5–5)
PROT SERPL-MCNC: 8 G/DL (ref 6–8.2)
RBC # BLD AUTO: 4.07 M/UL (ref 3.5–5.5)
SODIUM: 132 MMOL/L (ref 134–144)
WBC # BLD AUTO: 7.2 K/UL (ref 5–10)

## 2018-02-06 PROCEDURE — 1159F MED LIST DOCD IN RCRD: CPT | Mod: ,,, | Performed by: INTERNAL MEDICINE

## 2018-02-06 PROCEDURE — 1170F FXNL STATUS ASSESSED: CPT | Mod: ,,, | Performed by: INTERNAL MEDICINE

## 2018-02-06 PROCEDURE — 1125F AMNT PAIN NOTED PAIN PRSNT: CPT | Mod: ,,, | Performed by: INTERNAL MEDICINE

## 2018-02-06 PROCEDURE — 99214 OFFICE O/P EST MOD 30 MIN: CPT | Mod: ,,, | Performed by: INTERNAL MEDICINE

## 2018-02-06 RX ORDER — AMITRIPTYLINE HYDROCHLORIDE 25 MG/1
25 TABLET, FILM COATED ORAL 3 TIMES DAILY
Qty: 270 TABLET | Refills: 1 | Status: SHIPPED | OUTPATIENT
Start: 2018-02-06 | End: 2018-06-04 | Stop reason: SDUPTHER

## 2018-02-06 NOTE — PROGRESS NOTES
SUBJECTIVE:    Patient ID: Fransisca Frazier is a 72 y.o. female.    Chief Complaint: Follow-up (2 week)    HPI    IN FOR RECHECK    URI-IMPROVED    GFR-35 CC-SO WE HAVE TO ADJUST HER RX AND HAVE HER SEE RENAL    CHRONIC GB DISEASE-SHE IS READY FOR HER GB SURGERY-SIGNIFICANT PAIN RUQ WITH RADIATION TO THE BACK-NO NAUSEA-NO DIARRHEA-IN FACT SHE IS CONSTIPATED-SORENESS REMAINS IN THE LATERAL RIGHT LOWER CHEST-NO FEVER SINCE THE FLU-    CYSTITIS IS FLARED AT THIS TIME  Past Medical History:   Diagnosis Date    Cystitis, interstitial     Hypertension      Social History     Social History    Marital status:      Spouse name: N/A    Number of children: N/A    Years of education: N/A     Occupational History    Not on file.     Social History Main Topics    Smoking status: Former Smoker     Packs/day: 1.00     Years: 32.00     Quit date: 8/12/1993    Smokeless tobacco: Never Used    Alcohol use Yes      Comment: occasional    Drug use: No    Sexual activity: Not on file     Other Topics Concern    Not on file     Social History Narrative    No narrative on file     Past Surgical History:   Procedure Laterality Date    CYSTOSCOPY      D & C      DILATION AND CURETTAGE OF UTERUS       Family History   Problem Relation Age of Onset    Cancer Father     Cancer Sister     Cancer Brother     Cancer Brother        Review of Systems   Constitutional: Positive for appetite change (MODIFIED DUE TO GB DIET). Negative for chills, diaphoresis, fatigue and fever.        WEIGHT LOSS WITH GB PRECAUTIONS   HENT: Negative for congestion, ear pain, hearing loss, nosebleeds, postnasal drip, sinus pain, sinus pressure, sneezing, sore throat, tinnitus, trouble swallowing and voice change.    Eyes: Negative for photophobia, pain, itching and visual disturbance.   Respiratory: Negative for apnea, cough (MINIMAL NOW WITH PRODUCTION), chest tightness, shortness of breath, wheezing and stridor.    Cardiovascular: Negative  for chest pain, palpitations and leg swelling.   Gastrointestinal: Negative for abdominal distention (RUQ), blood in stool, constipation, diarrhea, nausea and vomiting. Abdominal pain: RUQ.   Endocrine: Negative for cold intolerance, heat intolerance, polydipsia and polyuria.   Genitourinary: Negative for difficulty urinating, dyspareunia, dysuria, flank pain, frequency, hematuria, menstrual problem, pelvic pain, urgency, vaginal discharge and vaginal pain.        IC   Musculoskeletal: Negative for arthralgias, back pain, joint swelling, myalgias, neck pain and neck stiffness.        LEG CRAMPS   Skin: Negative for pallor.   Allergic/Immunologic: Negative for environmental allergies and food allergies.   Neurological: Negative for dizziness, tremors, speech difficulty, weakness, light-headedness and numbness.   Hematological: Does not bruise/bleed easily.   Psychiatric/Behavioral: Negative for agitation, confusion, decreased concentration, sleep disturbance and suicidal ideas. The patient is not nervous/anxious.           Objective:      Physical Exam   Constitutional: She is oriented to person, place, and time. She appears well-developed. She is cooperative. No distress.   overweight   HENT:   Head: Normocephalic and atraumatic.   Right Ear: Tympanic membrane normal.   Left Ear: Tympanic membrane normal.   Mouth/Throat: Uvula is midline and mucous membranes are normal.   Eyes: Conjunctivae, EOM and lids are normal. Pupils are equal, round, and reactive to light. Right pupil is round and reactive. Left pupil is round and reactive.   Neck: Trachea normal and normal range of motion. Neck supple. No JVD present. No thyromegaly present.   Cardiovascular: Normal rate, regular rhythm, normal heart sounds and intact distal pulses.    Pulmonary/Chest: Effort normal and breath sounds normal. No tachypnea. No respiratory distress.   Abdominal: Soft. Bowel sounds are normal. There is tenderness (RUQ WITH SOME REBOUND).    Musculoskeletal: Normal range of motion.   Lymphadenopathy:     She has no cervical adenopathy.   Neurological: She is alert and oriented to person, place, and time. She has normal strength.   Skin: Skin is warm and dry. No rash noted.   Psychiatric: She has a normal mood and affect. Her speech is normal.   Nursing note and vitals reviewed.          Assessment:       1. BMI 45.0-49.9, adult    2. Interstitial cystitis    3. Acute cystitis with hematuria    4. Gallbladder disease    5. Chronic renal disease, stage 3, moderately decreased glomerular filtration rate (GFR) between 30-59 mL/min/1.73 square meter    6. H/O right flank pain    7. Cholecystitis    8. Urinary tract infection with hematuria, site unspecified         Plan:           BMI 45.0-49.9, adult    Interstitial cystitis  -     amitriptyline (ELAVIL) 25 MG tablet; Take 1 tablet (25 mg total) by mouth 3 (three) times daily.  Dispense: 270 tablet; Refill: 1  -     pentosan polysulfate (ELMIRON) 100 mg Cap; Take 1 capsule (100 mg total) by mouth 3 (three) times daily.  Dispense: 270 capsule; Refill: 1  -     CBC auto differential; Future; Expected date: 02/06/2018    Acute cystitis with hematuria    Gallbladder disease  -     Ambulatory referral to General Surgery  -     CBC auto differential; Future; Expected date: 02/06/2018  -     Comprehensive metabolic panel; Future; Expected date: 02/06/2018  -     US Abdomen Complete; Future; Expected date: 02/06/2018    Chronic renal disease, stage 3, moderately decreased glomerular filtration rate (GFR) between 30-59 mL/min/1.73 square meter  -     Ambulatory referral to Nephrology    H/O right flank pain    Cholecystitis  -     Hepatic function panel; Future; Expected date: 02/06/2018    Urinary tract infection with hematuria, site unspecified  -     Urinalysis  -     Urine culture

## 2018-02-06 NOTE — PATIENT INSTRUCTIONS
Cut diuretics to qod/  Chronic Kidney Disease (CKD)     The role of the kidneys is to remove waste products and extra water from the blood.  When the kidneys do not work as they should, waste products begin to build up in the blood. This is called chronic kidney disease (CKD). CKD means that you have kidney damage or a decrease in kidney function lasting at least 3 months. CKD allows extra water, waste, and toxins to build up in the body. This can eventually become life-threatening. You might need dialysis or a kidney transplant to stay alive. This most severe form is called end stage renal disease.  Diabetes is the leading causes of chronic renal failure. Other causes include high blood pressure, hardening of the arteries (atherosclerosis), lupus, inflammation of the blood vessels (vasculitis), and past viral or bacterial infections. Certain over-the-counter pain medicines can cause renal failure when taken often over a long period of time. These include aspirin, ibuprofen, and related anti-inflammatory medicines called NSAIDs (nonsteroidal anti-inflammatory drugs).  Home care  The following guidelines will help you care for yourself at home:  · If you have diabetes, talk with your healthcare provider about keeping your blood sugar under control. Ask if you need to make and changes to your diet, lifestyle, or medicines.  · If you have high blood pressure:  ¨ Take prescribed medicine to lower your blood pressure to the recommended goal of less than 130/80.  ¨ Start a regular exercise program that you enjoy. Check with your healthcare provider to be sure your planned exercise program is right for you.  ¨ Eat less salt (sodium). Your healthcare provider can tell you how much salt per day is safe for you.  · If you are overweight, talk with your healthcare provider about a weight loss plan.  · If you smoke, you must quit. Smoking makes kidney disease worse. Talk with your healthcare provider about ways to help you  quit.  For more information, visit the following links:  ¨ www.smokefree.gov/sites/default/files/pdf/clearing-the-air-accessible.pdf  ¨ www.smokefree.gov  ¨ www.cancer.org/healthy/stayawayfromtobacco/guidetoquittingsmoking/  · Most people with CKD need to follow a special diet.  Be sure you understand yours. In general, you will need to limit protein, salt, potassium, and phosphorus. You also need to limit how much fluid you drink.   · CKD is a risk factor for heart disease. Talk with your healthcare provider about any other risk factors you might have and what you can do to lessen them.  · Talk with your healthcare provider about any medicines you are taking to find out if they need to be reduced or stopped.  · Don't use the following over-the-counter medicines, or consult your healthcare provider before using:  ¨ Aspirin and NSAIDs such as ibuprofen or naproxen. Using acetaminophen for fever or pain is OK.  ¨ Laxatives and antacids containing magnesium or aluminum  ¨ Fleet or phospho soda enemas containing phosphorus  ¨ Certain stomach acid-blocking medicine such as cimetidine or ranitidine   ¨ Decongestants containing pseudoephedrine   ¨ Herbal supplements  Follow-up care  Follow up with your healthcare provider, or as advised. Contact one of the following for more information:  · American Association of Kidney Patients 514-311-4615 www.aakp.org  · National Kidney Foundation 818-851-6914 www.kidney.org  · American Kidney Fund 562-388-9590 www.kidneyfund.org  · National Kidney Disease Education Program 866-4KIDNEY www.nkdep.nih.gov  If an X-ray, ECG (cardiogram), or other diagnostic test was taken, you will be told of any new findings that may affect your care.  Call 911  Call 911 if you have any of the following:  · Severe weakness, dizziness, fainting, drowsiness, or confusion  · Chest pain or shortness of breath  · Heart beating fast, slow, or irregularly  When to seek medical advice  Call your healthcare  provider right away if any of these occur:  · Nausea or vomiting  · Fever of 100.4°F (38°C) or higher, or as directed by your healthcare provider  · Unexpected weight gain or swelling in the legs, ankles, or around the eyes  · Decrease or absent urine output  Date Last Reviewed: 9/1/2016  © 3727-0744 [employer]. [address] [place] [address], [place] [address]. All rights reserved. This information is not intended as a substitute for professional medical care. Always follow your healthcare professional's instructions.        Diet for Chronic Kidney Disease  Following a special diet when you have kidney disease can help you stay as healthy as possible. Your healthcare provider or dietitian should make a special diet plan just for you.    Eating right  Here are some good eating rules to follow:  · Protein. Eating protein is important for your body. But too much protein can put a strain on your kidneys. Eating less protein may slow the progression of chronic kidney disease. Foods high in protein include meat, fish, eggs, cheese, and other dairy products. A registered dietitian can help you plan a diet that has the right amount of protein for you.  · Sodium. Having too much salt in your diet can make your body hold onto (retain) water. Ask your provider or dietitian how much sodium per day you are allowed. This will help you avoid fluid buildup in your body (fluid retention). It can also help control high blood pressure. Learn to read food labels to know how much sodium is in one serving. Foods high in salt include processed meats, canned and boxed foods, sauces, salted chips and snacks, pickled foods, frozen dinners, and restaurant and fast food.  · Fluids. If you have advanced kidney disease, you will need to limit the water and fluids you drink. If you dont, then too much water will build up in your body. The exact amount of fluid you can drink depends on how well your kidneys are working. Ask your provider  how much water you can safely drink each day.  · Potassium. In advanced kidney disease, your potassium level can go dangerously high. This affects your heart. It can cause an irregular heartbeat (arrhythmia). Ask your provider or dietitian if you should limit potassium in your diet. Foods high in potassium include dairy products (milk, yogurt, cheese), dried beans, bananas, oranges, potatoes, tomatoes, spinach, cantaloupe, honeydew melon, dried fruits, and nuts.   · Calcium. Calcium is important to build strong bones. But foods high in calcium are also high in phosphorus, which can take calcium from your bones. Limiting foods high in phosphorus will help keep calcium in your bones. Ask your provider how much calcium you should get each day.  · Phosphorus. In advanced kidney disease, your phosphorus level can go dangerously high. This affects many systems in the body and can damage your heart. Limit your intake of phosphorus-rich foods. These include dried beans and peas, nuts, peanut butter, cocoa, beer, cola drinks, and dairy products.  Date Last Reviewed: 8/1/2016  © 7215-9685 [employer]. [address] [place] [address], [place] [address]. All rights reserved. This information is not intended as a substitute for professional medical care. Always follow your healthcare professional's instructions.

## 2018-02-09 ENCOUNTER — HOSPITAL ENCOUNTER (OUTPATIENT)
Dept: PREADMISSION TESTING | Facility: HOSPITAL | Age: 73
Discharge: HOME | End: 2018-02-09
Attending: SURGERY
Payer: MEDICARE

## 2018-02-09 ENCOUNTER — HOSPITAL ENCOUNTER (OUTPATIENT)
Dept: RADIOLOGY | Facility: HOSPITAL | Age: 73
Discharge: HOME | End: 2018-02-09
Attending: SURGERY
Payer: MEDICARE

## 2018-02-09 VITALS — BODY MASS INDEX: 47.13 KG/M2 | WEIGHT: 266 LBS | HEIGHT: 63 IN

## 2018-02-09 DIAGNOSIS — R10.13 ABDOMINAL PAIN, EPIGASTRIC: Primary | ICD-10-CM

## 2018-02-09 LAB
BILIRUB UR QL STRIP: NEGATIVE
CLARITY UR: CLEAR
COLOR UR: YELLOW
GLUCOSE UR QL STRIP: NEGATIVE
HGB UR QL STRIP: NEGATIVE
KETONES UR QL STRIP: NEGATIVE
LEUKOCYTE ESTERASE UR QL STRIP: NEGATIVE
NITRITE UR QL STRIP: NEGATIVE
PH UR STRIP: 6 [PH] (ref 5–8)
PROT UR QL STRIP: NEGATIVE
SP GR UR STRIP: <=1.005 (ref 1–1.03)
URN SPEC COLLECT METH UR: ABNORMAL
UROBILINOGEN UR STRIP-ACNC: NEGATIVE EU/DL

## 2018-02-09 PROCEDURE — 99900103 DSU ONLY-NO CHARGE-INITIAL HR (STAT)

## 2018-02-09 PROCEDURE — 71046 X-RAY EXAM CHEST 2 VIEWS: CPT | Mod: 26,,, | Performed by: RADIOLOGY

## 2018-02-09 PROCEDURE — 93005 ELECTROCARDIOGRAM TRACING: CPT

## 2018-02-09 PROCEDURE — 93010 ELECTROCARDIOGRAM REPORT: CPT | Mod: ,,, | Performed by: INTERNAL MEDICINE

## 2018-02-09 PROCEDURE — 81003 URINALYSIS AUTO W/O SCOPE: CPT

## 2018-02-09 PROCEDURE — 71046 X-RAY EXAM CHEST 2 VIEWS: CPT | Mod: TC,FY

## 2018-02-15 ENCOUNTER — ANESTHESIA EVENT (OUTPATIENT)
Dept: SURGERY | Facility: HOSPITAL | Age: 73
End: 2018-02-15
Payer: MEDICARE

## 2018-02-16 ENCOUNTER — ANESTHESIA (OUTPATIENT)
Dept: SURGERY | Facility: HOSPITAL | Age: 73
End: 2018-02-16
Payer: MEDICARE

## 2018-02-16 ENCOUNTER — HOSPITAL ENCOUNTER (OUTPATIENT)
Facility: HOSPITAL | Age: 73
Discharge: HOME | End: 2018-02-16
Attending: SURGERY | Admitting: SURGERY
Payer: MEDICARE

## 2018-02-16 ENCOUNTER — SURGERY (OUTPATIENT)
Age: 73
End: 2018-02-16

## 2018-02-16 VITALS
SYSTOLIC BLOOD PRESSURE: 139 MMHG | TEMPERATURE: 98 F | DIASTOLIC BLOOD PRESSURE: 63 MMHG | BODY MASS INDEX: 47.13 KG/M2 | WEIGHT: 266 LBS | RESPIRATION RATE: 16 BRPM | OXYGEN SATURATION: 99 % | HEIGHT: 63 IN | HEART RATE: 84 BPM

## 2018-02-16 DIAGNOSIS — R10.13 ABDOMINAL DISCOMFORT, EPIGASTRIC: Primary | ICD-10-CM

## 2018-02-16 DIAGNOSIS — R10.13 ABDOMINAL PAIN, ACUTE, EPIGASTRIC: ICD-10-CM

## 2018-02-16 LAB
APTT BLDCRRT: 36.1 SEC
INR PPP: 1.1
PROTHROMBIN TIME: 10.8 SEC

## 2018-02-16 PROCEDURE — 36415 COLL VENOUS BLD VENIPUNCTURE: CPT

## 2018-02-16 PROCEDURE — 85610 PROTHROMBIN TIME: CPT

## 2018-02-16 PROCEDURE — 85730 THROMBOPLASTIN TIME PARTIAL: CPT

## 2018-02-16 PROCEDURE — 25000003 PHARM REV CODE 250: Performed by: ANESTHESIOLOGY

## 2018-02-16 RX ORDER — SODIUM CHLORIDE 0.9 % (FLUSH) 0.9 %
3 SYRINGE (ML) INJECTION
Status: CANCELLED | OUTPATIENT
Start: 2018-02-16

## 2018-02-16 RX ORDER — PROMETHAZINE HYDROCHLORIDE 12.5 MG/1
12.5 TABLET ORAL EVERY 6 HOURS PRN
Status: CANCELLED | OUTPATIENT
Start: 2018-02-16

## 2018-02-16 RX ORDER — SODIUM CHLORIDE 0.9 % (FLUSH) 0.9 %
3 SYRINGE (ML) INJECTION EVERY 8 HOURS
Status: DISCONTINUED | OUTPATIENT
Start: 2018-02-16 | End: 2018-02-16 | Stop reason: HOSPADM

## 2018-02-16 RX ORDER — MEPERIDINE HYDROCHLORIDE 25 MG/ML
12.5 INJECTION INTRAMUSCULAR; INTRAVENOUS; SUBCUTANEOUS ONCE AS NEEDED
Status: CANCELLED | OUTPATIENT
Start: 2018-02-16 | End: 2018-02-16

## 2018-02-16 RX ORDER — SODIUM CHLORIDE 0.9 % (FLUSH) 0.9 %
3 SYRINGE (ML) INJECTION EVERY 8 HOURS
Status: CANCELLED | OUTPATIENT
Start: 2018-02-16

## 2018-02-16 RX ORDER — DIPHENHYDRAMINE HYDROCHLORIDE 50 MG/ML
25 INJECTION INTRAMUSCULAR; INTRAVENOUS EVERY 6 HOURS PRN
Status: CANCELLED | OUTPATIENT
Start: 2018-02-16

## 2018-02-16 RX ORDER — SODIUM CHLORIDE, SODIUM LACTATE, POTASSIUM CHLORIDE, CALCIUM CHLORIDE 600; 310; 30; 20 MG/100ML; MG/100ML; MG/100ML; MG/100ML
10 INJECTION, SOLUTION INTRAVENOUS CONTINUOUS
Status: DISCONTINUED | OUTPATIENT
Start: 2018-02-17 | End: 2018-02-16 | Stop reason: HOSPADM

## 2018-02-16 RX ORDER — HYDROMORPHONE HYDROCHLORIDE 2 MG/ML
0.2 INJECTION, SOLUTION INTRAMUSCULAR; INTRAVENOUS; SUBCUTANEOUS EVERY 5 MIN PRN
Status: CANCELLED | OUTPATIENT
Start: 2018-02-16

## 2018-02-16 RX ORDER — OXYCODONE HYDROCHLORIDE 5 MG/1
5 TABLET ORAL
Status: CANCELLED | OUTPATIENT
Start: 2018-02-16

## 2018-02-16 RX ORDER — ONDANSETRON 2 MG/ML
4 INJECTION INTRAMUSCULAR; INTRAVENOUS DAILY PRN
Status: CANCELLED | OUTPATIENT
Start: 2018-02-16

## 2018-02-16 RX ORDER — LIDOCAINE HYDROCHLORIDE 10 MG/ML
1 INJECTION, SOLUTION EPIDURAL; INFILTRATION; INTRACAUDAL; PERINEURAL ONCE
Status: COMPLETED | OUTPATIENT
Start: 2018-02-16 | End: 2018-02-16

## 2018-02-16 RX ORDER — FENTANYL CITRATE 50 UG/ML
25 INJECTION, SOLUTION INTRAMUSCULAR; INTRAVENOUS EVERY 5 MIN PRN
Status: CANCELLED | OUTPATIENT
Start: 2018-02-16

## 2018-02-16 RX ADMIN — SODIUM CHLORIDE, SODIUM LACTATE, POTASSIUM CHLORIDE, AND CALCIUM CHLORIDE 10 ML/HR: .6; .31; .03; .02 INJECTION, SOLUTION INTRAVENOUS at 09:02

## 2018-02-16 RX ADMIN — LIDOCAINE HYDROCHLORIDE 10 MG: 10 INJECTION, SOLUTION EPIDURAL; INFILTRATION; INTRACAUDAL; PERINEURAL at 09:02

## 2018-02-16 NOTE — DISCHARGE INSTRUCTIONS
Per Dr Scott  Do not take the Elmiron for 5 days and Dr Scott will reschedule and will call her for further instructions

## 2018-02-16 NOTE — PLAN OF CARE
Pt stated that she left her dentures at home  Glasses are on and will be placed inher belonging bag    VSS    140/63

## 2018-02-16 NOTE — ANESTHESIA PREPROCEDURE EVALUATION
02/16/2018  Fransisca Frazier is a 72 y.o., female.    Anesthesia Evaluation    I have reviewed the Patient Summary Reports.    I have reviewed the Nursing Notes.   I have reviewed the Medications.     Review of Systems  Anesthesia Hx:  No problems with previous Anesthesia    Social:  Former Smoker    Cardiovascular:   Hypertension    Pulmonary:  Pulmonary Normal    Renal/:  Renal/ Normal     Hepatic/GI:   GERD, well controlled    Neurological:  Neurology Normal    Endocrine:   Diabetes (Pt not on medications), well controlled, type 2        Physical Exam  General:  Well nourished    Airway/Jaw/Neck:  Airway Findings: Mouth Opening: Normal Tongue: Normal  General Airway Assessment: Adult  Oropharynx Findings:  Mallampati: II  Jaw/Neck Findings:  Neck ROM: Normal ROM     Eyes/Ears/Nose:  Eyes/Ears/Nose Findings:    Dental:  Dental Findings:   Chest/Lungs:  Chest/Lungs Findings: Normal Respiratory Rate     Heart/Vascular:  Heart Findings: Rate: Normal  Rhythm: Regular Rhythm        Mental Status:  Mental Status Findings:  Cooperative, Alert and Oriented         Anesthesia Plan  Type of Anesthesia, risks & benefits discussed:  Anesthesia Type:  general  Patient's Preference:   Intra-op Monitoring Plan:   Intra-op Monitoring Plan Comments:   Post Op Pain Control Plan: multimodal analgesia  Post Op Pain Control Plan Comments:   Induction:   IV  Beta Blocker:  Patient is not currently on a Beta-Blocker (No further documentation required).       Informed Consent: Patient understands risks and agrees with Anesthesia plan.  Questions answered. Anesthesia consent signed with patient.  ASA Score: 3     Day of Surgery Review of History & Physical:  There are no significant changes.   H&P completed by Anesthesiologist.       Ready For Surgery From Anesthesia Perspective.

## 2018-02-16 NOTE — PLAN OF CARE
Labs ordered, called results to ?Dr Scott    PTT 36.1  PT  10.8  INR  1.1    Dr Scott discussed with the pt and her   The decision to cancel the case today and stay off the medicaion Elmiron for 5 days and they will reschedule per Dr Scott    The pt and her  verbalized understanding

## 2018-02-16 NOTE — PLAN OF CARE
Pt  at the bedside and the pt decided to give her  her glassses instead of leaving them in the belonging bag  Surgical consent signed and verified signatures

## 2018-02-20 ENCOUNTER — ANESTHESIA EVENT (OUTPATIENT)
Dept: SURGERY | Facility: HOSPITAL | Age: 73
End: 2018-02-20
Payer: MEDICARE

## 2018-02-21 ENCOUNTER — ANESTHESIA (OUTPATIENT)
Dept: SURGERY | Facility: HOSPITAL | Age: 73
End: 2018-02-21
Payer: MEDICARE

## 2018-02-21 ENCOUNTER — HOSPITAL ENCOUNTER (OUTPATIENT)
Facility: HOSPITAL | Age: 73
Discharge: HOME | End: 2018-02-21
Attending: SURGERY | Admitting: SURGERY
Payer: MEDICARE

## 2018-02-21 DIAGNOSIS — K21.9 GASTROESOPHAGEAL REFLUX DISEASE WITHOUT ESOPHAGITIS: Primary | ICD-10-CM

## 2018-02-21 DIAGNOSIS — R10.13 ABDOMINAL DISCOMFORT, EPIGASTRIC: ICD-10-CM

## 2018-02-21 DIAGNOSIS — N18.30 CHRONIC KIDNEY DISEASE, STAGE 3: ICD-10-CM

## 2018-02-21 LAB
APTT BLDCRRT: 35.5 SEC
INR PPP: 1.1
PROTHROMBIN TIME: 10.8 SEC

## 2018-02-21 PROCEDURE — 25000003 PHARM REV CODE 250: Performed by: NURSE ANESTHETIST, CERTIFIED REGISTERED

## 2018-02-21 PROCEDURE — 63600175 PHARM REV CODE 636 W HCPCS: Performed by: NURSE ANESTHETIST, CERTIFIED REGISTERED

## 2018-02-21 PROCEDURE — 25000003 PHARM REV CODE 250: Performed by: ANESTHESIOLOGY

## 2018-02-21 PROCEDURE — D9220A PRA ANESTHESIA: Mod: ANES,,, | Performed by: ANESTHESIOLOGY

## 2018-02-21 PROCEDURE — 85610 PROTHROMBIN TIME: CPT

## 2018-02-21 PROCEDURE — 63600175 PHARM REV CODE 636 W HCPCS: Performed by: ANESTHESIOLOGY

## 2018-02-21 PROCEDURE — 36000708 HC OR TIME LEV III 1ST 15 MIN: Performed by: SURGERY

## 2018-02-21 PROCEDURE — 71000016 HC POSTOP RECOV ADDL HR: Performed by: SURGERY

## 2018-02-21 PROCEDURE — 36415 COLL VENOUS BLD VENIPUNCTURE: CPT

## 2018-02-21 PROCEDURE — 36000709 HC OR TIME LEV III EA ADD 15 MIN: Performed by: SURGERY

## 2018-02-21 PROCEDURE — 71000015 HC POSTOP RECOV 1ST HR: Performed by: SURGERY

## 2018-02-21 PROCEDURE — 99900104 DSU ONLY-NO CHARGE-EA ADD'L HR (STAT): Performed by: SURGERY

## 2018-02-21 PROCEDURE — 71000039 HC RECOVERY, EACH ADD'L HOUR: Performed by: SURGERY

## 2018-02-21 PROCEDURE — D9220A PRA ANESTHESIA: Mod: CRNA,,, | Performed by: NURSE ANESTHETIST, CERTIFIED REGISTERED

## 2018-02-21 PROCEDURE — 27201423 OPTIME MED/SURG SUP & DEVICES STERILE SUPPLY: Performed by: SURGERY

## 2018-02-21 PROCEDURE — 99900103 DSU ONLY-NO CHARGE-INITIAL HR (STAT): Performed by: SURGERY

## 2018-02-21 PROCEDURE — 88304 TISSUE EXAM BY PATHOLOGIST: CPT | Performed by: PATHOLOGY

## 2018-02-21 PROCEDURE — 37000008 HC ANESTHESIA 1ST 15 MINUTES: Performed by: SURGERY

## 2018-02-21 PROCEDURE — 63600175 PHARM REV CODE 636 W HCPCS: Performed by: SURGERY

## 2018-02-21 PROCEDURE — 47562 LAPAROSCOPIC CHOLECYSTECTOMY: CPT | Mod: 80,,, | Performed by: SURGERY

## 2018-02-21 PROCEDURE — 37000009 HC ANESTHESIA EA ADD 15 MINS: Performed by: SURGERY

## 2018-02-21 PROCEDURE — 71000033 HC RECOVERY, INTIAL HOUR: Performed by: SURGERY

## 2018-02-21 PROCEDURE — 85730 THROMBOPLASTIN TIME PARTIAL: CPT

## 2018-02-21 RX ORDER — SODIUM CHLORIDE 0.9 % (FLUSH) 0.9 %
3 SYRINGE (ML) INJECTION
Status: DISCONTINUED | OUTPATIENT
Start: 2018-02-21 | End: 2018-02-21 | Stop reason: HOSPADM

## 2018-02-21 RX ORDER — SUCCINYLCHOLINE CHLORIDE 20 MG/ML
INJECTION INTRAMUSCULAR; INTRAVENOUS
Status: DISCONTINUED | OUTPATIENT
Start: 2018-02-21 | End: 2018-02-21

## 2018-02-21 RX ORDER — SODIUM CHLORIDE 0.9 % (FLUSH) 0.9 %
3 SYRINGE (ML) INJECTION
Status: DISCONTINUED | OUTPATIENT
Start: 2018-02-21 | End: 2018-02-21

## 2018-02-21 RX ORDER — ROCURONIUM BROMIDE 10 MG/ML
INJECTION, SOLUTION INTRAVENOUS
Status: DISCONTINUED | OUTPATIENT
Start: 2018-02-21 | End: 2018-02-21

## 2018-02-21 RX ORDER — NEOSTIGMINE METHYLSULFATE 1 MG/ML
INJECTION, SOLUTION INTRAVENOUS
Status: DISCONTINUED | OUTPATIENT
Start: 2018-02-21 | End: 2018-02-21

## 2018-02-21 RX ORDER — DEXAMETHASONE SODIUM PHOSPHATE 4 MG/ML
INJECTION, SOLUTION INTRA-ARTICULAR; INTRALESIONAL; INTRAMUSCULAR; INTRAVENOUS; SOFT TISSUE
Status: DISCONTINUED | OUTPATIENT
Start: 2018-02-21 | End: 2018-02-21

## 2018-02-21 RX ORDER — FENTANYL CITRATE 50 UG/ML
25 INJECTION, SOLUTION INTRAMUSCULAR; INTRAVENOUS EVERY 5 MIN PRN
Status: COMPLETED | OUTPATIENT
Start: 2018-02-21 | End: 2018-02-21

## 2018-02-21 RX ORDER — SODIUM CHLORIDE, SODIUM LACTATE, POTASSIUM CHLORIDE, CALCIUM CHLORIDE 600; 310; 30; 20 MG/100ML; MG/100ML; MG/100ML; MG/100ML
INJECTION, SOLUTION INTRAVENOUS CONTINUOUS
Status: DISCONTINUED | OUTPATIENT
Start: 2018-02-21 | End: 2018-02-21 | Stop reason: HOSPADM

## 2018-02-21 RX ORDER — HYDROMORPHONE HYDROCHLORIDE 2 MG/ML
0.2 INJECTION, SOLUTION INTRAMUSCULAR; INTRAVENOUS; SUBCUTANEOUS EVERY 5 MIN PRN
Status: DISCONTINUED | OUTPATIENT
Start: 2018-02-21 | End: 2018-02-21

## 2018-02-21 RX ORDER — PROMETHAZINE HYDROCHLORIDE 25 MG/ML
6.25 INJECTION, SOLUTION INTRAMUSCULAR; INTRAVENOUS ONCE
Status: COMPLETED | OUTPATIENT
Start: 2018-02-21 | End: 2018-02-21

## 2018-02-21 RX ORDER — ONDANSETRON HYDROCHLORIDE 2 MG/ML
INJECTION, SOLUTION INTRAMUSCULAR; INTRAVENOUS
Status: DISCONTINUED | OUTPATIENT
Start: 2018-02-21 | End: 2018-02-21

## 2018-02-21 RX ORDER — MEPERIDINE HYDROCHLORIDE 25 MG/ML
12.5 INJECTION INTRAMUSCULAR; INTRAVENOUS; SUBCUTANEOUS ONCE AS NEEDED
Status: DISCONTINUED | OUTPATIENT
Start: 2018-02-21 | End: 2018-02-21

## 2018-02-21 RX ORDER — LIDOCAINE HYDROCHLORIDE 10 MG/ML
5 INJECTION, SOLUTION EPIDURAL; INFILTRATION; INTRACAUDAL; PERINEURAL ONCE
Status: COMPLETED | OUTPATIENT
Start: 2018-02-21 | End: 2018-02-21

## 2018-02-21 RX ORDER — FENTANYL CITRATE 50 UG/ML
25 INJECTION, SOLUTION INTRAMUSCULAR; INTRAVENOUS EVERY 5 MIN PRN
Status: DISCONTINUED | OUTPATIENT
Start: 2018-02-21 | End: 2018-02-21

## 2018-02-21 RX ORDER — MEPERIDINE HYDROCHLORIDE 25 MG/ML
12.5 INJECTION INTRAMUSCULAR; INTRAVENOUS; SUBCUTANEOUS ONCE AS NEEDED
Status: DISCONTINUED | OUTPATIENT
Start: 2018-02-21 | End: 2018-02-21 | Stop reason: HOSPADM

## 2018-02-21 RX ORDER — PROPOFOL 10 MG/ML
VIAL (ML) INTRAVENOUS
Status: DISCONTINUED | OUTPATIENT
Start: 2018-02-21 | End: 2018-02-21

## 2018-02-21 RX ORDER — OXYCODONE HYDROCHLORIDE 5 MG/1
5 TABLET ORAL ONCE AS NEEDED
Status: COMPLETED | OUTPATIENT
Start: 2018-02-22 | End: 2018-02-21

## 2018-02-21 RX ORDER — HYDROMORPHONE HYDROCHLORIDE 2 MG/ML
0.2 INJECTION, SOLUTION INTRAMUSCULAR; INTRAVENOUS; SUBCUTANEOUS EVERY 5 MIN PRN
Status: DISCONTINUED | OUTPATIENT
Start: 2018-02-21 | End: 2018-02-21 | Stop reason: HOSPADM

## 2018-02-21 RX ORDER — PHENYLEPHRINE HYDROCHLORIDE 10 MG/ML
INJECTION INTRAVENOUS
Status: DISCONTINUED | OUTPATIENT
Start: 2018-02-21 | End: 2018-02-21

## 2018-02-21 RX ORDER — DIPHENHYDRAMINE HYDROCHLORIDE 50 MG/ML
25 INJECTION INTRAMUSCULAR; INTRAVENOUS EVERY 6 HOURS PRN
Status: DISCONTINUED | OUTPATIENT
Start: 2018-02-21 | End: 2018-02-21

## 2018-02-21 RX ORDER — DIPHENHYDRAMINE HYDROCHLORIDE 50 MG/ML
25 INJECTION INTRAMUSCULAR; INTRAVENOUS EVERY 6 HOURS PRN
Status: DISCONTINUED | OUTPATIENT
Start: 2018-02-21 | End: 2018-02-21 | Stop reason: HOSPADM

## 2018-02-21 RX ORDER — FENTANYL CITRATE 50 UG/ML
INJECTION, SOLUTION INTRAMUSCULAR; INTRAVENOUS
Status: DISCONTINUED | OUTPATIENT
Start: 2018-02-21 | End: 2018-02-21

## 2018-02-21 RX ORDER — SODIUM CHLORIDE, SODIUM LACTATE, POTASSIUM CHLORIDE, CALCIUM CHLORIDE 600; 310; 30; 20 MG/100ML; MG/100ML; MG/100ML; MG/100ML
75 INJECTION, SOLUTION INTRAVENOUS CONTINUOUS
Status: DISCONTINUED | OUTPATIENT
Start: 2018-02-21 | End: 2018-02-21

## 2018-02-21 RX ORDER — ONDANSETRON 2 MG/ML
4 INJECTION INTRAMUSCULAR; INTRAVENOUS ONCE
Status: DISCONTINUED | OUTPATIENT
Start: 2018-02-21 | End: 2018-02-21

## 2018-02-21 RX ORDER — SODIUM CHLORIDE, SODIUM LACTATE, POTASSIUM CHLORIDE, CALCIUM CHLORIDE 600; 310; 30; 20 MG/100ML; MG/100ML; MG/100ML; MG/100ML
75 INJECTION, SOLUTION INTRAVENOUS CONTINUOUS
Status: DISCONTINUED | OUTPATIENT
Start: 2018-02-21 | End: 2018-02-21 | Stop reason: HOSPADM

## 2018-02-21 RX ORDER — OXYCODONE HYDROCHLORIDE 5 MG/1
5 TABLET ORAL ONCE AS NEEDED
Status: DISCONTINUED | OUTPATIENT
Start: 2018-02-22 | End: 2018-02-21

## 2018-02-21 RX ORDER — LIDOCAINE HCL/PF 100 MG/5ML
SYRINGE (ML) INTRAVENOUS
Status: DISCONTINUED | OUTPATIENT
Start: 2018-02-21 | End: 2018-02-21

## 2018-02-21 RX ORDER — GLYCOPYRROLATE 0.2 MG/ML
INJECTION INTRAMUSCULAR; INTRAVENOUS
Status: DISCONTINUED | OUTPATIENT
Start: 2018-02-21 | End: 2018-02-21

## 2018-02-21 RX ORDER — ONDANSETRON 2 MG/ML
4 INJECTION INTRAMUSCULAR; INTRAVENOUS ONCE
Status: COMPLETED | OUTPATIENT
Start: 2018-02-21 | End: 2018-02-21

## 2018-02-21 RX ORDER — ACETAMINOPHEN 10 MG/ML
INJECTION, SOLUTION INTRAVENOUS
Status: DISCONTINUED | OUTPATIENT
Start: 2018-02-21 | End: 2018-02-21

## 2018-02-21 RX ADMIN — FENTANYL CITRATE 25 MCG: 50 INJECTION, SOLUTION INTRAMUSCULAR; INTRAVENOUS at 09:02

## 2018-02-21 RX ADMIN — PROMETHAZINE HYDROCHLORIDE 6.25 MG: 25 INJECTION INTRAMUSCULAR; INTRAVENOUS at 09:02

## 2018-02-21 RX ADMIN — PHENYLEPHRINE HYDROCHLORIDE 100 MCG: 10 INJECTION INTRAVENOUS at 07:02

## 2018-02-21 RX ADMIN — SODIUM CHLORIDE, SODIUM LACTATE, POTASSIUM CHLORIDE, AND CALCIUM CHLORIDE: .6; .31; .03; .02 INJECTION, SOLUTION INTRAVENOUS at 07:02

## 2018-02-21 RX ADMIN — ACETAMINOPHEN 750 MG: 10 INJECTION, SOLUTION INTRAVENOUS at 07:02

## 2018-02-21 RX ADMIN — ROCURONIUM BROMIDE 5 MG: 10 INJECTION, SOLUTION INTRAVENOUS at 07:02

## 2018-02-21 RX ADMIN — PROPOFOL 150 MG: 10 INJECTION, EMULSION INTRAVENOUS at 07:02

## 2018-02-21 RX ADMIN — HYDROMORPHONE HYDROCHLORIDE 0.2 MG: 2 INJECTION INTRAMUSCULAR; INTRAVENOUS; SUBCUTANEOUS at 09:02

## 2018-02-21 RX ADMIN — GLYCOPYRROLATE 0.6 MG: 0.2 INJECTION, SOLUTION INTRAMUSCULAR; INTRAVENOUS at 08:02

## 2018-02-21 RX ADMIN — ONDANSETRON 4 MG: 2 INJECTION INTRAMUSCULAR; INTRAVENOUS at 09:02

## 2018-02-21 RX ADMIN — PIPERACILLIN AND TAZOBACTAM 3.38 G: 3; .375 INJECTION, POWDER, LYOPHILIZED, FOR SOLUTION INTRAVENOUS; PARENTERAL at 07:02

## 2018-02-21 RX ADMIN — DEXAMETHASONE SODIUM PHOSPHATE 4 MG: 4 INJECTION, SOLUTION INTRAMUSCULAR; INTRAVENOUS at 07:02

## 2018-02-21 RX ADMIN — OXYCODONE HYDROCHLORIDE 5 MG: 5 TABLET ORAL at 09:02

## 2018-02-21 RX ADMIN — SODIUM CHLORIDE, SODIUM LACTATE, POTASSIUM CHLORIDE, AND CALCIUM CHLORIDE 75 ML/HR: .6; .31; .03; .02 INJECTION, SOLUTION INTRAVENOUS at 09:02

## 2018-02-21 RX ADMIN — LIDOCAINE HYDROCHLORIDE 5 MG: 10 INJECTION, SOLUTION EPIDURAL; INFILTRATION; INTRACAUDAL; PERINEURAL at 06:02

## 2018-02-21 RX ADMIN — ONDANSETRON 4 MG: 2 INJECTION, SOLUTION INTRAMUSCULAR; INTRAVENOUS at 07:02

## 2018-02-21 RX ADMIN — LIDOCAINE HYDROCHLORIDE 75 MG: 20 INJECTION, SOLUTION INTRAVENOUS at 07:02

## 2018-02-21 RX ADMIN — SUCCINYLCHOLINE CHLORIDE 120 MG: 20 INJECTION, SOLUTION INTRAMUSCULAR; INTRAVENOUS at 07:02

## 2018-02-21 RX ADMIN — ROCURONIUM BROMIDE 35 MG: 10 INJECTION, SOLUTION INTRAVENOUS at 07:02

## 2018-02-21 RX ADMIN — NEOSTIGMINE METHYLSULFATE 3.5 MG: 1 INJECTION INTRAVENOUS at 08:02

## 2018-02-21 RX ADMIN — FENTANYL CITRATE 100 MCG: 50 INJECTION, SOLUTION INTRAMUSCULAR; INTRAVENOUS at 07:02

## 2018-02-21 NOTE — PLAN OF CARE
Pre op assessment performed and questions answered.  Pt states she is anxious, reassurance provided

## 2018-02-21 NOTE — ANESTHESIA POSTPROCEDURE EVALUATION
"Anesthesia Post Evaluation    Patient: Fransisca Frazier    Procedure(s) Performed: Procedure(s) (LRB):  CHOLECYSTECTOMY-LAPAROSCOPIC (N/A)    Final Anesthesia Type: general  Patient location during evaluation: PACU  Patient participation: Yes- Able to Participate  Level of consciousness: awake and alert and oriented  Post-procedure vital signs: reviewed and stable  Pain management: adequate  Airway patency: patent  PONV status at discharge: No PONV  Anesthetic complications: no      Cardiovascular status: blood pressure returned to baseline  Respiratory status: unassisted, spontaneous ventilation and room air  Hydration status: euvolemic  Follow-up not needed.        Visit Vitals  /60   Pulse 85   Temp 36.7 °C (98 °F)   Resp 16   Ht 5' 3" (1.6 m)   Wt 120.7 kg (266 lb)   SpO2 (!) 91%   Breastfeeding? No   BMI 47.12 kg/m²       Pain/Marc Score: Pain Assessment Performed: Yes (2/21/2018  6:31 AM)  Presence of Pain: complains of pain/discomfort (2/21/2018 10:38 AM)  Pain Rating Prior to Med Admin: 6 (2/21/2018 10:00 AM)  Pain Rating Post Med Admin: 5 (2/21/2018 10:15 AM)  Marc Score: 10 (2/21/2018 10:38 AM)      "

## 2018-02-21 NOTE — OP NOTE
DATE OF PROCEDURE:  02/21/2018.    PREOPERATIVE DIAGNOSES:  1.  Cholelithiasis.  2.  Biliary colic.  3.  Morbid obesity.  4.  Chronic kidney disease, stage III.  5.  Non-insulin-dependent diabetes mellitus.  6.  History of peptic ulcer disease.  7.  Hypothyroidism.    POSTOPERATIVE DIAGNOSES:    PROCEDURE PERFORMED:  1.  Laparoscopic cholecystectomy.  2.  Intra-abdominal adhesiolysis.    SURGEON:  Lisseth Scott M.D.    FIRST ASSISTANT:  Ricardo Dillard M.D.    ANESTHESIA:  General endotracheal.    ESTIMATED BLOOD LOSS:  Approximately 20 mL    DRAINS:  None.    COMPLICATIONS:  None.    PROCEDURE IN DETAIL:  The patient was brought to the Operating Room where she   was placed on the operating room table in the supine position.  Following   general endotracheal anesthesia, the patient's abdomen was prepped and draped in   sterile fashion using chlorhexidine prep.  The patient had not had any previous   abdominal surgery.  A supraumbilical incision was made and carried down to the   subcutaneous tissue.  Bleeding was controlled by use of electrocautery.  The S   retractors were used to separate the subcutaneous tissue down to the fascia.    The patient had very thin fascia.  Stay sutures and 0 Vicryl were placed on the   superior and inferior fascial edges and with upward retraction, the peritoneum   was grasped and entered.  The Tigre trocar was then placed and the abdomen was   insufflated to approximately 4 liters.  The patient was placed in the reverse   Trendelenburg position and rotated to her left.  The lateral two 5-mm trocars   were placed under direct visualization with no bleeding.  The patient had a very   thick, very abundant amount of omentum that was covering in the liver, which   had to be pulled down to visualize the gallbladder.  The top of the gallbladder   was grasped and upward traction applied.  The fundus of the gallbladder was then   grasped and upward traction applied; however, with the thick  abundant omentum,   it kept falling into the wound and I could not see to do the surgery to get down   and clear the cystic duct and cystic artery, so a fan retractor was placed,   which held back the omentum.  Once this was done, the adhesions were taken down   from the anterior surface of the gallbladder using the electrocautery and the   dolphin nose dissector.  The cystic duct was then cleared down to the   bifurcation of the common bile duct.  It was then clipped twice distally, once   proximally and transected.  Following this, the artery, which we first saw, I   dissected up and this was the right hepatic artery.  It was found the patient   had a very short cystic artery coming off very close to the gallbladder and the   right hepatic.  This was clipped twice distally, once proximally and transected.    Once this was done, the gallbladder was taken down using first the dolphin   nose dissector and once I got up further on the gallbladder, the spatula and   electrocautery were then used.  Once the gallbladder was removed, it was placed   in an EndoCatch bag and brought out through the upper 10 mm trocar site, which   had been placed after positioning with the spinal needle.  I am inspecting the   liver bed.  She had a few areas that were oozing and these were coagulated using   the spatula.  Pressure was dropped to 7 in inspection and there was no bleeding   and no bile leakage.  Pressure was then returned to about 14 and the area was   irrigated and as much irrigation fluid as possible was suctioned out of her   abdomen.  I placed her back in the neutral position to hopefully get some of the   fluid that was down in the pelvis up and suctioned this out.  A piece of   Surgicel was then placed in the liver bed.  Following this, the trocars were   removed under direct visualization and no bleeding from any of the trocar sites.    The two 10 mm trocar sites, the fascia was closed with interrupted 0 Vicryl    sutures.  The skin of all 4 incisions was closed with 4-0 Monocryl subcuticular   sutures.  Sterile Band-Aids were placed over all 5 incisions.  The patient was   then awakened from general endotracheal anesthesia and taken to the Recovery   Room in stable condition.  All counts were reported as correct x2 prior to   closing the incisions.      ROSA MARIA  dd: 02/21/2018 09:20:49 (CST)  td: 02/21/2018 10:16:03 (CST)  Doc ID   #[id number]  Job ID #[id number]    CC:

## 2018-02-21 NOTE — TRANSFER OF CARE
"Anesthesia Transfer of Care Note    Patient: Fransisca Frazier    Procedure(s) Performed: Procedure(s) (LRB):  CHOLECYSTECTOMY-LAPAROSCOPIC (N/A)    Patient location: PACU    Anesthesia Type: general    Transport from OR: Transported from OR on 2-3 L/min O2 by NC with adequate spontaneous ventilation    Post pain: adequate analgesia    Post assessment: no apparent anesthetic complications and tolerated procedure well    Post vital signs: stable    Level of consciousness: awake, alert and oriented    Nausea/Vomiting: no nausea/vomiting    Complications: none    Transfer of care protocol was followed      Last vitals:   Visit Vitals  BP (!) 194/68 (BP Location: Left arm, Patient Position: Lying)   Pulse 102   Temp 36.6 °C (97.8 °F) (Temporal)   Resp 14   Ht 5' 3" (1.6 m)   Wt 120.7 kg (266 lb)   Breastfeeding? No   BMI 47.12 kg/m²     "

## 2018-02-21 NOTE — ANESTHESIA POSTPROCEDURE EVALUATION
"Anesthesia Post Evaluation    Patient: Fransisca Frazier    Procedure(s) Performed: Procedure(s) (LRB):  CHOLECYSTECTOMY-LAPAROSCOPIC (N/A)    [facility] Anesthesia Post Op Evaluation    Visit Vitals  BP (!) 194/68 (BP Location: Left arm, Patient Position: Lying)   Pulse 102   Temp 36.6 °C (97.8 °F) (Temporal)   Resp 14   Ht 5' 3" (1.6 m)   Wt 120.7 kg (266 lb)   Breastfeeding? No   BMI 47.12 kg/m²       Pain/Marc Score: Pain Assessment Performed: Yes (2/21/2018  6:31 AM)  Presence of Pain: denies (2/21/2018  6:31 AM)      "

## 2018-02-21 NOTE — PLAN OF CARE
"No emesis dr bernal released from pacu to phase 2  aware of pt  Vs intermit nausea pt insisited  To get pain pill oral  Given  ;  Had pain meds rates pain a 5/10 states its tolerable as long as I dont move" instr pt she will feel better getting rid of gas pains from procedure  Due to void ivf cont bandaids x 5 on abd dry intact   "

## 2018-02-21 NOTE — ANESTHESIA PREPROCEDURE EVALUATION
02/21/2018  Fransisca Frazier is a 72 y.o., female.    Anesthesia Evaluation    I have reviewed the Patient Summary Reports.    I have reviewed the Nursing Notes.   I have reviewed the Medications.     Review of Systems  Anesthesia Hx:  No problems with previous Anesthesia    Social:  Non-Smoker    Cardiovascular:   Hypertension    Hepatic/GI:   GERD    Endocrine:   Diabetes, type 2        Physical Exam  General:  Morbid Obesity    Airway/Jaw/Neck:  Airway Findings: Mouth Opening: Normal Tongue: Normal  General Airway Assessment: Adult, Good  Mallampati: II  Improves to II with phonation.  TM Distance: 4-6 cm      Dental:  Dental Findings: Edentulous   Chest/Lungs:  Chest/Lungs Findings: Clear to auscultation, Normal Respiratory Rate     Heart/Vascular:  Heart Findings: Rate: Normal  Rhythm: Regular Rhythm  Sounds: Normal  Heart murmur: negative       Mental Status:  Mental Status Findings:  Cooperative, Alert and Oriented         Anesthesia Plan  Type of Anesthesia, risks & benefits discussed:  Anesthesia Type:  general  Patient's Preference:   Intra-op Monitoring Plan: standard ASA monitors  Intra-op Monitoring Plan Comments:   Post Op Pain Control Plan:   Post Op Pain Control Plan Comments:   Induction:   IV  Beta Blocker:  Patient is not currently on a Beta-Blocker (No further documentation required).       Informed Consent: Patient understands risks and agrees with Anesthesia plan.  Questions answered. Anesthesia consent signed with patient.  ASA Score: 3     Day of Surgery Review of History & Physical: I have interviewed and examined the patient. I have reviewed the patient's H&P dated:  There are no significant changes.          Ready For Surgery From Anesthesia Perspective.

## 2018-02-21 NOTE — BRIEF OP NOTE
[facility]  Brief Operative Note     SUMMARY     Surgery Date: 2/21/2018     Surgeon(s) and Role:     * Ricardo Dillard MD - Assisting     * Lisseth Scott MD - Primary        Pre-op Diagnosis:  Disease of gallbladder [K82.9]    Post-op Diagnosis:  Post-Op Diagnosis Codes:     * Disease of gallbladder [K82.9]    Procedure(s) (LRB):  CHOLECYSTECTOMY-LAPAROSCOPIC (N/A)    Anesthesia: General    Description of the findings of the procedure: Very large amt of omentum--had to place Fan retractor to hold omentum back to visualize operative field    Findings/Key Components: AS ABOVE    Estimated Blood Loss: 5 mL         Specimens:   Specimen (12h ago through future)    Start     Ordered    02/21/18 0747  Specimen to Pathology - Surgery  Once     Comments:  Pre-op Diagnosis: Disease of gallbladder [K82.9]Post-op Diagnosis: sameProcedure(s):CHOLECYSTECTOMY-LAPAROSCOPIC Number of specimens: 1Name of specimens: gallbladder      02/21/18 0747          Discharge Note    SUMMARY     Admit Date: 2/21/2018    Discharge Date and Time:  02/21/2018 9:12 AM    Hospital Course (synopsis of major diagnoses, care, treatment, and services provided during the course of the hospital stay): To be discharged today once cleared by Anesth. No heavy lifting for 3 weeks     Final Diagnosis: Post-Op Diagnosis Codes:     * Disease of gallbladder [K82.9]    Disposition: Home or Self Care    Follow Up/Patient Instructions: 1). No heavy lifting [id number] Do not remove bandaides until Fri evening, may then shower    Medications:  Reconciled Home Medications:   Current Discharge Medication List      CONTINUE these medications which have NOT CHANGED    Details   amitriptyline (ELAVIL) 25 MG tablet Take 1 tablet (25 mg total) by mouth 3 (three) times daily.  Qty: 270 tablet, Refills: 1    Associated Diagnoses: Interstitial cystitis      benzonatate (TESSALON) 100 MG capsule Take 1 capsule (100 mg total) by mouth 3 (three) times daily  as needed for Cough.  Qty: 30 capsule, Refills: 0    Associated Diagnoses: Bronchitis      estradiol (ESTRING) 2 mg (7.5 mcg /24 hour) vaginal ring Place 2 mg vaginally every 3 (three) months.  Qty: 1 each, Refills: 3    Associated Diagnoses: History of recurrent UTIs; Atrophic vaginitis      ezetimibe (ZETIA) 10 mg tablet Take 10 mg by mouth every evening.      furosemide (LASIX) 20 MG tablet Take 20 mg by mouth as needed.       glucosamine-chondroitin 500-400 mg tablet Take 1 tablet by mouth once daily.      OSCIMIN SL 0.125 mg Subl DISSOLVE 1 TABLET UNDER THE TONGUE THREE TIMES A DAY AS NEEDED  Qty: 270 tablet, Refills: 2      pantoprazole (PROTONIX) 40 MG tablet 40 mg once daily.       pentosan polysulfate (ELMIRON) 100 mg Cap Take 1 capsule (100 mg total) by mouth 3 (three) times daily.  Qty: 270 capsule, Refills: 1    Associated Diagnoses: Interstitial cystitis      spironolactone (ALDACTONE) 100 MG tablet Take 100 mg by mouth as needed.   Refills: 0      valsartan (DIOVAN) 160 MG tablet Take 160 mg by mouth once daily.              Discharge Procedure Orders  Diet diabetic     Diet renal     Lifting restrictions   Order Comments: No heavy lifting for 3 weeks       Follow-up Information     Lisseth Scott MD.    Specialty:  General Surgery  Why:  Pt already has f/u apt in my office  Contact information:  [address]  [address]  [place] [id number] [phone]

## 2018-02-21 NOTE — PLAN OF CARE
Meets criteria for discharge. Discharged to home with . Denies nausea at present. Tolerating fluids. Pain tolerable. Steady gait with assist. Voiding without difficulty. Discharge instructions reviewed and printed handout given. Included information on renal and diabetic diet. Verbalized understanding.

## 2018-02-22 VITALS
HEART RATE: 83 BPM | TEMPERATURE: 98 F | RESPIRATION RATE: 18 BRPM | SYSTOLIC BLOOD PRESSURE: 141 MMHG | OXYGEN SATURATION: 94 % | WEIGHT: 266 LBS | BODY MASS INDEX: 47.13 KG/M2 | DIASTOLIC BLOOD PRESSURE: 66 MMHG | HEIGHT: 63 IN

## 2018-03-07 ENCOUNTER — OFFICE VISIT (OUTPATIENT)
Dept: UROGYNECOLOGY | Facility: CLINIC | Age: 73
End: 2018-03-07
Payer: MEDICARE

## 2018-03-07 VITALS
HEART RATE: 89 BPM | DIASTOLIC BLOOD PRESSURE: 86 MMHG | BODY MASS INDEX: 45.7 KG/M2 | TEMPERATURE: 98 F | HEIGHT: 63 IN | WEIGHT: 257.94 LBS | SYSTOLIC BLOOD PRESSURE: 177 MMHG

## 2018-03-07 DIAGNOSIS — N81.11 CYSTOCELE, MIDLINE: ICD-10-CM

## 2018-03-07 DIAGNOSIS — N30.10 IC (INTERSTITIAL CYSTITIS): ICD-10-CM

## 2018-03-07 DIAGNOSIS — N95.2 ATROPHIC VAGINITIS: ICD-10-CM

## 2018-03-07 DIAGNOSIS — R35.0 URINARY FREQUENCY: Primary | ICD-10-CM

## 2018-03-07 DIAGNOSIS — Z46.89 PESSARY MAINTENANCE: ICD-10-CM

## 2018-03-07 LAB
BILIRUB SERPL-MCNC: NORMAL MG/DL
BLOOD URINE, POC: NORMAL
COLOR, POC UA: NORMAL
GLUCOSE UR QL STRIP: NORMAL
KETONES UR QL STRIP: NORMAL
LEUKOCYTE ESTERASE URINE, POC: NORMAL
NITRITE, POC UA: NORMAL
PH, POC UA: 6
PROTEIN, POC: NORMAL
SPECIFIC GRAVITY, POC UA: 1.01
UROBILINOGEN, POC UA: NORMAL

## 2018-03-07 PROCEDURE — 87086 URINE CULTURE/COLONY COUNT: CPT

## 2018-03-07 PROCEDURE — 99213 OFFICE O/P EST LOW 20 MIN: CPT | Mod: S$PBB,,, | Performed by: NURSE PRACTITIONER

## 2018-03-07 PROCEDURE — 81002 URINALYSIS NONAUTO W/O SCOPE: CPT | Mod: PBBFAC,PO | Performed by: NURSE PRACTITIONER

## 2018-03-07 PROCEDURE — 99214 OFFICE O/P EST MOD 30 MIN: CPT | Mod: PBBFAC,PO | Performed by: NURSE PRACTITIONER

## 2018-03-07 PROCEDURE — 99999 PR PBB SHADOW E&M-EST. PATIENT-LVL IV: CPT | Mod: PBBFAC,,, | Performed by: NURSE PRACTITIONER

## 2018-03-07 RX ORDER — ESOMEPRAZOLE MAGNESIUM 40 MG/1
40 CAPSULE, DELAYED RELEASE ORAL
COMMUNITY
End: 2018-06-04

## 2018-03-07 RX ORDER — CIPROFLOXACIN 500 MG/1
500 TABLET ORAL 2 TIMES DAILY
Qty: 14 TABLET | Refills: 0 | Status: SHIPPED | OUTPATIENT
Start: 2018-03-07 | End: 2018-03-14

## 2018-03-07 NOTE — PROGRESS NOTES
Subjective:       Patient ID: Fransisca Frazier is a 72 y.o. female.    Chief Complaint: Follow-up (3 month f/u); Urinary Frequency; and Dysuria    HPI  Fransisca Frazier is a 72 y.o. female who presents today for estring change.  She has been doing fine with the estring and has not had any complaints in that regard.  She had gall bladder surgery on 02/21/18 and had to stop taking her elmiron.  She currently is having urinary frequency/urgency and some dysuria.  She is not sure if it is an I.C. Flare or if she has an infection.  She never took the daily prophylactic antibiotics because it didn't get ordered for her right away and then she had other health concerns that took priority.  If this urine comes back with infection, she would like to try the low dose antibiotics to see if this helps prevent recurrent infection.    Review of Systems   Constitutional: Negative for activity change, fever and unexpected weight change.   HENT: Negative for hearing loss.    Eyes: Negative for visual disturbance.   Respiratory: Negative for shortness of breath and wheezing.    Cardiovascular: Negative for chest pain, palpitations and leg swelling.   Gastrointestinal: Positive for abdominal pain. Negative for constipation and diarrhea.   Genitourinary: Positive for dysuria, frequency and urgency. Negative for dyspareunia, vaginal bleeding and vaginal discharge.   Musculoskeletal: Negative for gait problem and neck pain.   Skin: Negative for rash and wound.   Allergic/Immunologic: Negative for immunocompromised state.   Neurological: Negative for tremors, speech difficulty and weakness.   Hematological: Does not bruise/bleed easily.   Psychiatric/Behavioral: Negative for agitation and confusion.       Objective:      Physical Exam   Constitutional: She is oriented to person, place, and time. She appears well-developed and well-nourished. No distress.   HENT:   Head: Normocephalic and atraumatic.   Neck: Neck supple. No thyromegaly  present.   Pulmonary/Chest: Effort normal. No respiratory distress.   Abdominal: Soft. There is tenderness in the right upper quadrant. No hernia.   Surgical tenderness at times from cholecystectomy   Musculoskeletal: Normal range of motion.   Neurological: She is alert and oriented to person, place, and time.   Skin: Skin is warm and dry. No rash noted.   Psychiatric: She has a normal mood and affect. Her behavior is normal.     Pelvic Exam:  V: No lesions. No palpable nodes.   Va: no discharge or bleeding.  Good length.  Midline cystocele Ba=-1  Meatus:No caruncle or stenosis  Urethra: Non tender. No suburethral masses.  Cx/Cuff: Normal   Uterus: small, non-tender  Ad: No mass or tenderness.  Levators :Symmetrical. Normal tone. Non tender.  BL: Non tender  RV: No hemorrhoids.      Assessment:       1. Urinary frequency    2. Cystocele, midline    3. Pessary maintenance    4. Atrophic vaginitis    5. IC (interstitial cystitis)          Procedure note- estring pessary removed and discarded.  After betadine irrigation of the vagina, a new estring pessary, which patient provided,  was reinserted into the vagina.  Pt ambulated in the room and denies any discomfort.  NP reminded pt that the first 24-48 hours are the most likely time for the pessary to fall out and to monitor the toilet before flushing.  Pt verbalized understanding.      Plan:       Urinary frequency- we will send her urine for a culture.  I will send antibiotics as noted below, but pt will not take them until we get the results on the culture.  If her urine is positive, we will treat accordingly, but then begin low dose prophylaxis.  -     POCT URINE DIPSTICK WITHOUT MICROSCOPE  -     Urine culture  -     ciprofloxacin HCl (CIPRO) 500 MG tablet; Take 1 tablet (500 mg total) by mouth 2 (two) times daily.  Dispense: 14 tablet; Refill: 0    Cystocele, midline- continue with estring    Pessary maintenance- continue with estring    Atrophic vaginitis-  continue with estring    IC (interstitial cystitis)- pt has resumed elmiron, monitor    RTC 3 months

## 2018-03-08 LAB — BACTERIA UR CULT: NORMAL

## 2018-03-09 NOTE — PROGRESS NOTES
"Dictation #1  MRN:[id number]  CSN:[id number]  Progress Note    Admit Date: 2/16/2018   LOS: 0 days     SUBJECTIVE:     Follow-up For:  Pt admitted today for Lap. Estefany. She takes Elmiron for her Interstitial Cystitis. This med is a weak blood thinner. She did not have coag studies, so ordered them "STAT" while in preop. Pt states she hadn't taken the med for 2 days.    Scheduled Meds:  Continuous Infusions:  PRN Meds:    Review of patient's allergies indicates:  No Known Allergies    Review of Systems:  GI= 1). RUQ/epigastric pain;Biliary Colic 2). Low EF 2012; 16%. EF 2017=67%. +FFI      OBJECTIVE:     Vital Signs (Most Recent)  Temp: 97.9 °F (36.6 °C) (02/16/18 0850)  Pulse: 84 (02/16/18 1200)  Resp: 16 (02/16/18 1200)  BP: 139/63 (02/16/18 1200)  SpO2: 99 % (02/16/18 1200)    Vital Signs Range (Last 24H):       I & O (Last 24H):No intake or output data in the 24 hours ending 03/09/18 1048  Physical Exam:  No exam performed today, just did H&P in office for surgery    Laboratory:  Coagulation: PTT=36.1  INR=1.1    Diagnostic Results:SEE ABOVE    ASSESSMENT/PLAN:     Assessment: 1) Biliary Cholic 2) +FFI  3) Low E.F. 2012 (normal in 2017 ??)    Plan: Since pt's PTT was elevated and she is obese, I felt it better to cancel her surgery today; STOP the ELMIRON, and will reschedule her surgery in 5-7 days.            NO fried or greasy foods; and avoid lettuce    "

## 2018-06-04 ENCOUNTER — OFFICE VISIT (OUTPATIENT)
Dept: INTERNAL MEDICINE | Facility: CLINIC | Age: 73
End: 2018-06-04
Payer: MEDICARE

## 2018-06-04 VITALS
RESPIRATION RATE: 18 BRPM | BODY MASS INDEX: 45 KG/M2 | OXYGEN SATURATION: 95 % | DIASTOLIC BLOOD PRESSURE: 66 MMHG | SYSTOLIC BLOOD PRESSURE: 126 MMHG | TEMPERATURE: 98 F | WEIGHT: 254 LBS | HEIGHT: 63 IN | HEART RATE: 72 BPM

## 2018-06-04 DIAGNOSIS — E78.00 PURE HYPERCHOLESTEROLEMIA: ICD-10-CM

## 2018-06-04 DIAGNOSIS — L71.9 ROSACEA: ICD-10-CM

## 2018-06-04 DIAGNOSIS — N30.10 INTERSTITIAL CYSTITIS: Chronic | ICD-10-CM

## 2018-06-04 DIAGNOSIS — Z11.59 NEED FOR HEPATITIS C SCREENING TEST: ICD-10-CM

## 2018-06-04 DIAGNOSIS — I10 HYPERTENSION, UNSPECIFIED TYPE: Primary | ICD-10-CM

## 2018-06-04 DIAGNOSIS — R73.01 IFG (IMPAIRED FASTING GLUCOSE): ICD-10-CM

## 2018-06-04 DIAGNOSIS — M89.9 BONE DISEASE: ICD-10-CM

## 2018-06-04 DIAGNOSIS — K21.9 GERD WITHOUT ESOPHAGITIS: ICD-10-CM

## 2018-06-04 PROCEDURE — 99213 OFFICE O/P EST LOW 20 MIN: CPT | Mod: ,,, | Performed by: INTERNAL MEDICINE

## 2018-06-04 RX ORDER — AMITRIPTYLINE HYDROCHLORIDE 25 MG/1
25 TABLET, FILM COATED ORAL 3 TIMES DAILY
Qty: 270 TABLET | Refills: 1 | Status: SHIPPED | OUTPATIENT
Start: 2018-06-04 | End: 2018-12-04 | Stop reason: SDUPTHER

## 2018-06-04 RX ORDER — SUCRALFATE 1 G/1
1 TABLET ORAL 4 TIMES DAILY
Qty: 120 TABLET | Refills: 2 | Status: SHIPPED | OUTPATIENT
Start: 2018-06-04 | End: 2018-12-04 | Stop reason: SDUPTHER

## 2018-06-04 RX ORDER — PANTOPRAZOLE SODIUM 40 MG/1
40 TABLET, DELAYED RELEASE ORAL DAILY
Qty: 90 TABLET | Refills: 1 | Status: SHIPPED | OUTPATIENT
Start: 2018-06-04 | End: 2018-12-04 | Stop reason: SDUPTHER

## 2018-06-04 RX ORDER — HYOSCYAMINE SULFATE 0.12 MG/1
TABLET SUBLINGUAL
Qty: 270 TABLET | Refills: 1 | Status: SHIPPED | OUTPATIENT
Start: 2018-06-04 | End: 2018-12-04 | Stop reason: SDUPTHER

## 2018-06-04 RX ORDER — EZETIMIBE 10 MG/1
10 TABLET ORAL NIGHTLY
Qty: 90 TABLET | Refills: 1 | Status: SHIPPED | OUTPATIENT
Start: 2018-06-04 | End: 2018-12-04 | Stop reason: SDUPTHER

## 2018-06-04 RX ORDER — AZELAIC ACID 0.15 G/G
GEL TOPICAL
Qty: 150 G | Refills: 1 | Status: SHIPPED | OUTPATIENT
Start: 2018-06-04 | End: 2019-09-12

## 2018-06-04 RX ORDER — VALSARTAN 160 MG/1
160 TABLET ORAL DAILY
Qty: 90 TABLET | Refills: 1 | Status: SHIPPED | OUTPATIENT
Start: 2018-06-04 | End: 2018-12-04 | Stop reason: SDUPTHER

## 2018-06-04 NOTE — PROGRESS NOTES
SUBJECTIVE:    Patient ID: Fransisca Frazier is a 72 y.o. female.    Chief Complaint: Chronic Kidney Disease and Follow-up    HPI     In for follow-up chronic renal disease--GFR 32 cc    SPO cholecystectomy-till gets nauseated but no GB component-    Labs-AC 5.7--glucose was 110    Past Medical History:   Diagnosis Date    Back pain     Cystitis, interstitial     Hypertension     Wears glasses     Wears partial dentures     upper     Social History     Social History    Marital status:      Spouse name: N/A    Number of children: N/A    Years of education: N/A     Occupational History    Not on file.     Social History Main Topics    Smoking status: Former Smoker     Packs/day: 1.00     Years: 32.00     Types: Cigarettes     Quit date: 8/12/1993    Smokeless tobacco: Never Used    Alcohol use Yes      Comment: occasional  2/14/2018    Drug use: No    Sexual activity: Not on file     Other Topics Concern    Not on file     Social History Narrative    No narrative on file     Past Surgical History:   Procedure Laterality Date    CHOLECYSTECTOMY  02/21/2018    CYSTOSCOPY      D & C      DILATION AND CURETTAGE OF UTERUS      EYE SURGERY      bilat cataract     Family History   Problem Relation Age of Onset    Cancer Father     Cancer Sister     Cancer Brother     Cancer Brother        Review of Systems   Constitutional: Negative for appetite change, chills, diaphoresis, fatigue, fever and unexpected weight change.        Losing weight post op   HENT: Negative for congestion, ear pain, hearing loss, nosebleeds, postnasal drip, sinus pain, sinus pressure, sneezing, sore throat, tinnitus, trouble swallowing and voice change.    Eyes: Negative for photophobia, pain, itching and visual disturbance.   Respiratory: Negative for apnea, cough, chest tightness, shortness of breath, wheezing and stridor.    Cardiovascular: Negative for chest pain, palpitations and leg swelling.   Gastrointestinal:  Negative for abdominal distention, abdominal pain, blood in stool, constipation, diarrhea, nausea and vomiting.        Upset stomach that improves with antacid-affected by diet   Endocrine: Negative for cold intolerance, heat intolerance, polydipsia and polyuria.   Genitourinary: Negative for difficulty urinating, dyspareunia, dysuria, flank pain, frequency, hematuria, menstrual problem, pelvic pain, urgency, vaginal discharge and vaginal pain.   Musculoskeletal: Negative for arthralgias, back pain, joint swelling, myalgias, neck pain and neck stiffness.   Skin: Negative for pallor.   Allergic/Immunologic: Negative for environmental allergies and food allergies.   Neurological: Negative for dizziness, tremors, speech difficulty, weakness, light-headedness and numbness.   Hematological: Does not bruise/bleed easily.   Psychiatric/Behavioral: Negative for agitation, confusion, decreased concentration, sleep disturbance and suicidal ideas. The patient is not nervous/anxious.           Objective:      Physical Exam   Constitutional: She is oriented to person, place, and time. She appears well-developed and well-nourished. She is cooperative. No distress.   overweight   HENT:   Head: Normocephalic and atraumatic.   Right Ear: Tympanic membrane normal.   Left Ear: Tympanic membrane normal.   Mouth/Throat: Uvula is midline and mucous membranes are normal.   Eyes: Conjunctivae, EOM and lids are normal. Pupils are equal, round, and reactive to light. Right pupil is round and reactive. Left pupil is round and reactive.   Neck: Trachea normal and normal range of motion. Neck supple. No JVD present. No thyromegaly present.   Cardiovascular: Normal rate, regular rhythm, normal heart sounds and intact distal pulses.    Pulses:       Dorsalis pedis pulses are 3+ on the right side, and 3+ on the left side.        Posterior tibial pulses are 3+ on the right side, and 3+ on the left side.   Pulmonary/Chest: Effort normal and breath  sounds normal. No tachypnea. No respiratory distress.   Abdominal: Soft. Bowel sounds are normal. There is no tenderness.   Musculoskeletal: Normal range of motion.   Lymphadenopathy:     She has no cervical adenopathy.   Neurological: She is alert and oriented to person, place, and time. She has normal strength.   Skin: Skin is warm and dry. No rash noted.   Psychiatric: She has a normal mood and affect. Her speech is normal.   Nursing note and vitals reviewed.          Assessment:       1. Hypertension, unspecified type    2. Interstitial cystitis    3. IFG (impaired fasting glucose)    4. Bone disease    5. BMI 40.0-44.9, adult    6. GERD without esophagitis    7. Need for hepatitis C screening test    8. Pure hypercholesterolemia    9. Rosacea         Plan:           Hypertension, unspecified type  -     valsartan (DIOVAN) 160 MG tablet; Take 1 tablet (160 mg total) by mouth once daily.  Dispense: 90 tablet; Refill: 1    Interstitial cystitis  -     pentosan polysulfate (ELMIRON) 100 mg Cap; Take 1 capsule (100 mg total) by mouth 3 (three) times daily.  Dispense: 270 capsule; Refill: 1  -     hyoscyamine (OSCIMIN SL) 0.125 mg Subl; DISSOLVE 1 TABLET UNDER THE TONGUE THREE TIMES A DAY AS NEEDED  Dispense: 270 tablet; Refill: 1  -     amitriptyline (ELAVIL) 25 MG tablet; Take 1 tablet (25 mg total) by mouth 3 (three) times daily.  Dispense: 270 tablet; Refill: 1  -     CBC auto differential; Future; Expected date: 06/04/2018  -     Comprehensive metabolic panel; Future; Expected date: 06/04/2018    IFG (impaired fasting glucose)  -     Comprehensive metabolic panel; Future; Expected date: 06/04/2018  -     Microalbumin/creatinine urine ratio  -     Hemoglobin A1C, POCT    Bone disease  -     DXA Bone Density Spine And Hip    BMI 40.0-44.9, adult    GERD without esophagitis  -     pantoprazole (PROTONIX) 40 MG tablet; Take 1 tablet (40 mg total) by mouth once daily.  Dispense: 90 tablet; Refill: 1  -      Comprehensive metabolic panel; Future; Expected date: 06/04/2018  -     sucralfate (CARAFATE) 1 gram tablet; Take 1 tablet (1 g total) by mouth 4 (four) times daily.  Dispense: 120 tablet; Refill: 2    Need for hepatitis C screening test  -     Hepatitis C antibody; Future; Expected date: 06/04/2018    Pure hypercholesterolemia  -     ezetimibe (ZETIA) 10 mg tablet; Take 1 tablet (10 mg total) by mouth every evening.  Dispense: 90 tablet; Refill: 1  -     Comprehensive metabolic panel; Future; Expected date: 06/04/2018  -     Lipid panel; Future; Expected date: 06/04/2018    Rosacea  -     azelaic acid (AZELEX) 15 % gel; Use daily on face  Dispense: 150 g; Refill: 1

## 2018-06-04 NOTE — PATIENT INSTRUCTIONS
Healthy Meals for Diabetes     A healthcare provider will help you develop a meal plan that fits your needs.   Ask your healthcare team to help you make a meal plan that fits your needs. Your meal plan tells you when to eat your meals and snacks, what kinds of foods to eat, and how much of each food to eat. You dont have to give up all the foods you like. But you do need to follow some guidelines.  Choose healthy carbohydrates  Starches, sugars, and fiber are all types of carbohydrates. Fiber can help lower your cholesterol and triglycerides. Fiber is also healthy for your heart. You should have 20 to 35 grams of total fiber each day. Fiber-rich foods include:  · Whole-grain breads and cereals  · Bulgur wheat  · Brown rice     · Whole-wheat pasta  · Fruits and vegetables  · Dry beans, and peas   Keep track of the amount of carbohydrates you eat. This can help you keep the right balance of physical activity and medicine. The amount of carbohydrates needed will vary for each person. It depends on many things such as your health, the medicines you take, and how active you are. Your healthcare team will help you figure out the right amount of carbohydrates for you. You may start with around 45 to 60 grams of carbohydrates per meal, depending on your situation.   Here are some examples of foods containing about 15 grams of carbohydrates (1 serving of carbohydrates):  · 1/2 cup of canned or frozen fruit  · A small piece of fresh fruit (4 ounces)  · 1 slice of bread  · 1/2 cup of oatmeal  · 1/3 cup of rice  · 4 to 6 crackers  · 1/2 English muffin  · 1/2 cup of black beans  · 1/4 of a large baked potato (3 ounces)  · 2/3 cup of plain fat-free yogurt  · 1 cup of soup  · 1/2 cup of casserole  · 6 chicken nuggets  · 2-inch-square brownie or cake without frosting  · 2 small cookies  · 1/2 cup of ice cream or sherbet  Choose healthy protein foods  Eating protein that is low in fat can help you control your weight. It also  helps keep your heart healthy. Low-fat protein foods include:  · Fish  · Plant proteins, such as dry beans and peas, nuts, and soy products like tofu and soymilk  · Lean meat with all visible fat removed  · Poultry with the skin removed  · Low-fat or nonfat milk, cheese, and yogurt  Limit unhealthy fats and sugar  Saturated and trans fats are unhealthy for your heart. They raise LDL (bad) cholesterol. Fat is also high in calories, so it can make you gain weight. To cut down on unhealthy fats and sugar, limit these foods:  · Butter or margarine  · Palm and palm kernel oils and coconut oil  · Cream  · Cheese  · Koehler  · Lunch meats     · Ice cream  · Sweet bakery goods such as pies, muffins, and donuts  · Jams and jellies  · Candy bars  · Regular sodas   How much to eat  The amount of food you eat affects your blood sugar. It also affects your weight. Your healthcare team will tell you how much of each type of food you should eat.  · Use measuring cups and spoons and a food scale to measure serving sizes.  · Learn what a correct serving size looks like on your plate. This will help when you are away from home and cant measure your servings.  · Eat only the number of servings given on your meal plan for each food. Dont take seconds.  · Learn to read food labels. Be sure to look at serving size, total carbohydrates, fiber, calories, sugar, and saturated and trans fats. Look for healthier alternatives to foods that have added sugar.  · Plan ahead for parties so you can still have a good time without going overboard with unhealthy food choices. Set a good example yourself by bringing a healthy dish to pot lucks.   Choose healthy snacks  When it comes to snacks, we usually think about foods with added sugar and fats. But there are many other options for healthier snack choices. Here are a few snack ideas to choose from:  Snacks with less than 5 grams of carbohydrates  · 1 piece of string cheese  · 3 celery sticks plus 1  tablespoon of peanut butter  · 5 cherry tomatoes plus 1 tablespoon of ranch dressing  · 1 hard-boiled egg  · 1/4 cup of fresh blueberries  ·  5 baby carrots  · 1 cup of light popcorn  · 1/2 cup of sugar-free gelatin  · 15 almonds  Snacks with about 10 to 20 grams of carbohydrates  · 1/3 cup of hummus plus 1 cup of fresh cut nonstarchy vegetables (carrots, green peppers, broccoli, celery, or a combination)  · 1/2 cup of fresh or canned fruit plus 1/4 cup of cottage cheese  · 1/2 cup of tuna salad with 4 crackers  · 2 rice cakes and a tablespoon of peanut butter  · 1 small apple or orange  · 3 cups light popcorn  · 1/2 of a turkey sandwich (1 slice of whole-wheat bread, 2 ounces of turkey, and mustard)  Portion sizes are important to controlling your blood sugar and staying at a healthy weight. Stock up on healthy snack items so you always have them on hand.  When to eat  Your meal plan will likely include breakfast, lunch, dinner, and some snacks.  · Try to eat your meals and snacks at about the same times each day.  · Eat all your meals and snacks. Skipping a meal or snack can make your blood sugar drop too low. It can also cause you to eat too much at the next meal or snack. Then your blood sugar could get too high.  Date Last Reviewed: 7/1/2016  © 2453-9053 [employer]. [address] [place] [address], [place] [address]. All rights reserved. This information is not intended as a substitute for professional medical care. Always follow your healthcare professional's instructions.        Understanding Carbohydrates, Fats, and Protein  Food is a source of fuel and nourishment for your body. Its also a source of pleasure. Having diabetes doesnt mean you have to eat special foods or give up desserts. Instead, your dietitian can show you how to plan meals to suit your body. To start, learn how different foods affect blood sugar.  Carbohydrates  Carbohydrates are the main source of fuel for the body. Carbohydrates  raise blood sugar. Many people think carbohydrates are only found in pasta or bread. But carbohydrates are actually in many kinds of foods:  · Sugars occur naturally in foods such as fruit, milk, honey, and molasses. Sugars can also be added to many foods, from cereals and yogurt to candy and desserts. Sugars raise blood sugar.  · Starches are found in bread, cereals, pasta, and dried beans. Theyre also found in corn, peas, potatoes, yam, acorn squash, and butternut squash. Starches also raise blood sugar.   · Fiber is found in foods such as vegetables, fruits, beans, and whole grains. Unlike other carbs, fiber isnt digested or absorbed. So it doesnt raise blood sugar. In fact, fiber can help keep blood sugar from rising too fast. It also helps keep blood cholesterol at a healthy level.  Did you know?  Even though carbohydrates raise blood sugar, its best to have some in every meal. They are an important part of a healthy diet.   Fat  Fat is an energy source that can be stored until needed. Fat does not raise blood sugar. However, it can raise blood cholesterol, increasing the risk of heart disease. Fat is also high in calories, which can cause weight gain. Not all types of fat are the same.  More Healthy:  · Monounsaturated fats are mostly found in vegetable oils, such as olive, canola, and peanut oils. They are also found in avocados and some nuts. Monounsaturated fats are healthy for your heart. Thats because they lower LDL (unhealthy) cholesterol.  · Polyunsaturated fats are mostly found in vegetable oils, such as corn, safflower, and soybean oils. They are also found in some seeds, nuts, and fish. Polyunsaturated fats lower LDL (unhealthy) cholesterol. So, choosing them instead of saturated fats is healthy for your heart. Certain unsaturated fats can help lower triglycerides.   Less Healthy:  · Saturated fats are found in animal products, such as meat, poultry, whole milk, lard, and butter. Saturated fats  raise LDL cholesterol and are not healthy for your heart.  · Hydrogenated oils and trans fats are formed when vegetable oils are processed into solid fats. They are found in many processed foods. Hydrogenated oils and trans fats raise LDL cholesterol and lower HDL (healthy) cholesterol. They are not healthy for your heart.  Protein  Protein helps the body build and repair muscle and other tissue. Protein has little or no effect on blood sugar. However, many foods that contain protein also contain saturated fat. By choosing low-fat protein sources, you can get the benefits of protein without the extra fat:  · Plant protein is found in dry beans and peas, nuts, and soy products, such as tofu and soymilk. These sources tend to be cholesterol-free and low in saturated fat.  · Animal protein is found in fish, poultry, meat, cheese, milk, and eggs. These contain cholesterol and can be high in saturated fat. Aim for lean, lower-fat choices.  Date Last Reviewed: 3/1/2016  © 2609-1962 [employer]. [address] [place] [address]. All rights reserved. This information is not intended as a substitute for professional medical care. Always follow your healthcare professional's instructions.        Understanding Carbohydrates    A car needs the right type of fuel to run. And you need the right kind of food to function. To keep your energy level up, your body needs food that has carbohydrates. But carbohydrates raise blood sugar levels higher and faster than other kinds of food. Your dietitian will work with you to figure out the amount of carbohydrates you need.  Starches  Starches are found in grains, some vegetables, and beans. Grain products include bread, pasta, cereal, and tortillas. Starchy vegetables include potatoes, peas, corn, lima beans, yams, and squash. Kidney beans, lehman beans, black beans, garbanzo beans, and lentils also contain starches.  Sugars  Sugars are found naturally in many foods.  Or sugar can be added. Foods that contain natural sugar include fruits and fruit juices, dairy products, honey, and molasses. Added sugars are found in most desserts, processed foods, candy, regular soda, and fruit drinks. These are very helpful for treating low blood sugar, or hypoglycemia. They provide sugar quickly. Try to keep at least 15 to 20 grams of these simple sugars with you at all times. Eat this if you begin to have low blood sugar symptoms.  Fiber  Fiber comes from plant foods. Most fiber isnt digested by the body. Instead of raising blood sugar levels like other carbohydrates, it actually stops blood sugar from rising too fast. Fiber is found in fruits, vegetables, whole grains, beans, peas, and many nuts.  Carb counting  Keep track of the amount of carbohydrates you eat. This can help you keep the right balance of physical activity and medicine. The amount of carbohydrates needed will vary for each person. It depends on many things such as your health, the medicines you take, and how active you are. Your healthcare team will help you figure out the right amount of carbohydrates for you. You may start with around 45 to 60 grams of carbohydrate per meal, depending on your situation. Carb counting is a system that helps you keep track of the carbohydrates you eat at each meal.  Carbohydrates come from a variety of foods. These include grains, starchy vegetables, fruit, milk, beans, and snack foods. You can either count carbohydrate grams or carbohydrate servings. When you count carbohydrate servings, 1 carbohydrate serving = 15 grams of carbohydrates.  Here are some examples of foods containing about 15 grams of carbohydrates (1 serving of carbohydrates):  · 1/2 cup of canned or frozen fruit  · A small piece of fresh fruit (4 ounces)  · 1 slice of bread  · 1/2 cup of oatmeal  · 1/3 cup of rice  · 4 to 6 crackers  · 1/2 English muffin  · 1/2 cup of black beans  · 1/4 of a large baked potato (3  ounces)  · 2/3 cup of plain fat-free yogurt  · 1 cup of soup  · 1/2 cup of casserole  · 6 chicken nuggets  · 2-inch-square brownie or cake without frosting  · 2 small cookies  · 1/2 cup of ice cream or sherbet  Carb counting is easier when food labels are available. Look at the label to see how many grams of total carbohydrates the food contains. Then you can figure out how much you should eat.  Two very important lines to look at on the label are the serving size and the total carbohydrate amount. Here are some tips for using food labels to count your carbohydrate intake:  · Check the serving size. The information on the label is based on that serving size. If you eat more than the listed serving size, you may have to double or triple the other information on the label.   · Check the total grams of carbohydrates. Total carbohydrate from the label includes sugar, starch, and fiber. Be sure to use the total carbohydrate number and not sugar alone.  · Know how many grams of carbohydrates you can have.  Be familiar with the matching portion sizes.  · Compare labels. Compare the labels of different products, looking at serving sizes and total carbohydrates to find the products that work best for you.   · Don't forget protein and fat. With all the focus on carb counting, it might be easy to forget protein and fat in your meals. Don't forget to include sources of protein and healthy fat to balance your meals.  Its also important to be consistent with the amount and time you eat when taking a fixed dose of diabetes medicine. Work with your healthcare provider or dietitian if you need additional help. He or she can help you keep track of your carbohydrate intake. He or she can also help you figure out how many grams of carbohydrates you should have.  Date Last Reviewed: 7/1/2016  © 4440-5084 [email or web address]. [address] [place] [address], [place] [address]. All rights reserved. This information is not intended as a  substitute for professional medical care. Always follow your healthcare professional's instructions.

## 2018-06-06 ENCOUNTER — OFFICE VISIT (OUTPATIENT)
Dept: UROGYNECOLOGY | Facility: CLINIC | Age: 73
End: 2018-06-06
Payer: MEDICARE

## 2018-06-06 VITALS
WEIGHT: 254.44 LBS | BODY MASS INDEX: 45.08 KG/M2 | SYSTOLIC BLOOD PRESSURE: 163 MMHG | HEIGHT: 63 IN | HEART RATE: 101 BPM | DIASTOLIC BLOOD PRESSURE: 82 MMHG

## 2018-06-06 DIAGNOSIS — N95.2 ATROPHIC VAGINITIS: ICD-10-CM

## 2018-06-06 DIAGNOSIS — Z46.89 PESSARY MAINTENANCE: ICD-10-CM

## 2018-06-06 DIAGNOSIS — N81.11 CYSTOCELE, MIDLINE: ICD-10-CM

## 2018-06-06 DIAGNOSIS — R35.0 URINARY FREQUENCY: Primary | ICD-10-CM

## 2018-06-06 DIAGNOSIS — N34.2 URETHRITIS: ICD-10-CM

## 2018-06-06 DIAGNOSIS — N30.10 IC (INTERSTITIAL CYSTITIS): ICD-10-CM

## 2018-06-06 DIAGNOSIS — Z87.440 HISTORY OF RECURRENT UTIS: ICD-10-CM

## 2018-06-06 LAB
BILIRUB SERPL-MCNC: NORMAL MG/DL
BLOOD URINE, POC: NORMAL
COLOR, POC UA: YELLOW
GLUCOSE UR QL STRIP: NORMAL
KETONES UR QL STRIP: NORMAL
LEUKOCYTE ESTERASE URINE, POC: NORMAL
NITRITE, POC UA: NORMAL
PH, POC UA: 5
PROTEIN, POC: NORMAL
SPECIFIC GRAVITY, POC UA: 1.01
UROBILINOGEN, POC UA: NORMAL

## 2018-06-06 PROCEDURE — 81002 URINALYSIS NONAUTO W/O SCOPE: CPT | Mod: PBBFAC,PO | Performed by: NURSE PRACTITIONER

## 2018-06-06 PROCEDURE — 99999 PR PBB SHADOW E&M-EST. PATIENT-LVL III: CPT | Mod: PBBFAC,,, | Performed by: NURSE PRACTITIONER

## 2018-06-06 PROCEDURE — 99213 OFFICE O/P EST LOW 20 MIN: CPT | Mod: PBBFAC,PO | Performed by: NURSE PRACTITIONER

## 2018-06-06 PROCEDURE — 99213 OFFICE O/P EST LOW 20 MIN: CPT | Mod: S$PBB,,, | Performed by: NURSE PRACTITIONER

## 2018-06-06 RX ORDER — CIPROFLOXACIN 500 MG/1
500 TABLET ORAL 2 TIMES DAILY
Qty: 10 TABLET | Refills: 0 | Status: SHIPPED | OUTPATIENT
Start: 2018-06-06 | End: 2018-06-11

## 2018-06-06 NOTE — PROGRESS NOTES
Subjective:       Patient ID: Fransisca Frazier is a 72 y.o. female.    Chief Complaint: Pessary Check    HPI  Fransisca Frazier is a 72 y.o. female who presents today for routine pessary maintenance.  She has been using the pessary for quite awhile and feels that it works well for her.  After her last visit, she was concerned that she had a UTI.  We sent her urine for culture and it showed no growth.  It was thought that she was having more of an I.C. Flare.  She waited about 2 weeks and then saw some small blood clots.  She took the cipro and her pain and bleeding went away.  She has been diagnosed with urethritis in the past and has problems with her urethra.  She has not had a recent cysto or urethroscopy.  Her last one was in 1993.  As she sits today she is having some tenderness near the urethra.  She denies any bladder pain, increased frequency, urgency or any real dysuria.  She just knows that when she moves a certain way her urethra is tender.  She denies any vaginal complaints/concerns.  She is ready to proceed with the pessary.    Review of Systems   Constitutional: Negative for activity change, fever and unexpected weight change.   HENT: Negative for hearing loss.    Eyes: Negative for visual disturbance.   Respiratory: Negative for shortness of breath and wheezing.    Cardiovascular: Negative for chest pain, palpitations and leg swelling.   Gastrointestinal: Negative for abdominal pain, constipation and diarrhea.   Genitourinary: Negative for dyspareunia, dysuria, frequency, urgency, vaginal bleeding and vaginal discharge.        Urethral pain   Musculoskeletal: Negative for gait problem and neck pain.   Skin: Negative for rash and wound.   Allergic/Immunologic: Negative for immunocompromised state.   Neurological: Negative for tremors, speech difficulty and weakness.   Hematological: Does not bruise/bleed easily.   Psychiatric/Behavioral: Negative for agitation and confusion.       Objective:      Physical  Exam   Constitutional: She is oriented to person, place, and time. She appears well-developed and well-nourished. No distress.   HENT:   Head: Normocephalic and atraumatic.   Neck: Neck supple. No thyromegaly present.   Pulmonary/Chest: Effort normal. No respiratory distress.   Abdominal: Soft. There is no tenderness. No hernia.   Musculoskeletal: Normal range of motion.   Neurological: She is alert and oriented to person, place, and time.   Skin: Skin is warm and dry. No rash noted.   Psychiatric: She has a normal mood and affect. Her behavior is normal.     Pelvic Exam:  V: No lesions. No palpable nodes.   Va: no discharge or bleeding.  Good length.  Dominant midline cystocele Ba=-1  Meatus:No caruncle or stenosis  Urethra: mildly tender with removal of the estring.  After estring removed, Np attempted to illicit pain in the urethral area and pt did not feel any real tenderness. No suburethral masses.  Cx/Cuff: Normal   Uterus: small, non-tender  Ad: No mass or tenderness.  Levators :Symmetrical. Normal tone. Non tender.  BL: Non tender  RV: No hemorrhoids.      Assessment:       1. Urinary frequency    2. Cystocele, midline    3. Pessary maintenance    4. Atrophic vaginitis    5. IC (interstitial cystitis)    6. History of recurrent UTIs    7. Urethritis          Procedure note- estring pessary removed and discarded.  After betadine irrigation of the vagina, a new estring pessary, which the pt provided,  was reinserted into the vagina.  Pt ambulated in the room and denies any discomfort.  NP reminded pt that the first 24-48 hours are the most likely time for the pessary to fall out and to monitor the toilet before flushing.  Pt verbalized understanding.      Plan:       Urinary frequency- monitor  -     POCT urine dipstick without microscope    Cystocele, midline- continue with estring    Pessary maintenance- continue with estring    Atrophic vaginitis- continue estring  -     estradiol (ESTRING) 2 mg (7.5 mcg  /24 hour) vaginal ring; Place 2 mg vaginally every 3 (three) months.  Dispense: 1 each; Refill: 3    IC (interstitial cystitis)- continue elmiron    History of recurrent UTIs- monitor  -     estradiol (ESTRING) 2 mg (7.5 mcg /24 hour) vaginal ring; Place 2 mg vaginally every 3 (three) months.  Dispense: 1 each; Refill: 3    Urethritis- NP also recommended pt see Dr. Guerrero for cysto and urethroscopy  -     ciprofloxacin HCl (CIPRO) 500 MG tablet; Take 1 tablet (500 mg total) by mouth 2 (two) times daily.  Dispense: 10 tablet; Refill: 0    RTC 3 months for estring and as available for Dr. Guerrero

## 2018-06-07 DIAGNOSIS — M85.851 OSTEOPENIA OF BOTH HIPS: Primary | ICD-10-CM

## 2018-06-07 DIAGNOSIS — M85.852 OSTEOPENIA OF BOTH HIPS: Primary | ICD-10-CM

## 2018-06-07 LAB
ALBUMIN SERPL-MCNC: 4.3 G/DL (ref 3.1–4.7)
ALP SERPL-CCNC: 71 IU/L (ref 40–104)
ALT (SGPT): 21 IU/L (ref 3–33)
AST SERPL-CCNC: 31 IU/L (ref 10–40)
BASOPHILS NFR BLD: 0.1 K/UL (ref 0–0.2)
BASOPHILS NFR BLD: 0.9 %
BILIRUB SERPL-MCNC: 0.8 MG/DL (ref 0.3–1)
BUN SERPL-MCNC: 17 MG/DL (ref 8–20)
CALCIUM SERPL-MCNC: 9.8 MG/DL (ref 7.7–10.4)
CHLORIDE: 100 MMOL/L (ref 98–110)
CO2 SERPL-SCNC: 29.2 MMOL/L (ref 22.8–31.6)
CREATININE: 1.31 MG/DL (ref 0.6–1.4)
EOSINOPHIL NFR BLD: 0.3 K/UL (ref 0–0.7)
EOSINOPHIL NFR BLD: 5.3 %
ERYTHROCYTE [DISTWIDTH] IN BLOOD BY AUTOMATED COUNT: 13.7 % (ref 11.7–14.9)
GLUCOSE: 103 MG/DL (ref 70–99)
GRAN #: 3 K/UL (ref 1.4–6.5)
GRAN%: 52.1 %
HCT VFR BLD AUTO: 42.9 % (ref 36–48)
HGB BLD-MCNC: 14.1 G/DL (ref 12–15)
IMMATURE GRANS (ABS): 0 K/UL (ref 0–1)
IMMATURE GRANULOCYTES: 0.2 %
LYMPH #: 1.5 K/UL (ref 1.2–3.4)
LYMPH%: 26.4 %
MCH RBC QN AUTO: 31.8 PG (ref 25–35)
MCHC RBC AUTO-ENTMCNC: 32.9 G/DL (ref 31–36)
MCV RBC AUTO: 96.8 FL (ref 79–98)
MONO #: 0.9 K/UL (ref 0.1–0.6)
MONO%: 15.1 %
NUCLEATED RBCS: 0 %
PLATELET # BLD AUTO: 255 K/UL (ref 140–440)
PMV BLD AUTO: 12.3 FL (ref 8.8–12.7)
POTASSIUM SERPL-SCNC: 4.6 MMOL/L (ref 3.5–5)
PROT SERPL-MCNC: 7.9 G/DL (ref 6–8.2)
RBC # BLD AUTO: 4.43 M/UL (ref 3.5–5.5)
SODIUM: 138 MMOL/L (ref 134–144)
WBC # BLD AUTO: 5.7 K/UL (ref 5–10)

## 2018-06-09 LAB — HCV AB SERPL QL IA: 0.1 S/CO RATIO (ref 0–0.9)

## 2018-06-10 RX ORDER — ALENDRONATE SODIUM 70 MG/1
70 TABLET ORAL
Qty: 12 TABLET | Refills: 1 | Status: SHIPPED | OUTPATIENT
Start: 2018-06-10 | End: 2018-12-04 | Stop reason: SDUPTHER

## 2018-06-11 ENCOUNTER — TELEPHONE (OUTPATIENT)
Dept: INTERNAL MEDICINE | Facility: CLINIC | Age: 73
End: 2018-06-11

## 2018-06-11 NOTE — TELEPHONE ENCOUNTER
----- Message from Elda Paredes MD sent at 6/10/2018 11:47 AM CDT -----  Please call the patient regarding her abnormal result.-some decrease in bone mineral density-Im going to send rx fr [facility]-weekly ofsamax

## 2018-07-16 NOTE — DISCHARGE INSTRUCTIONS
Discharge Instructions for Laparoscopic Cholecystectomy  You have had a procedure known as a laparoscopic cholecystectomy. A laparoscopic cholecystectomy is a procedure to remove your gallbladder. People who have this procedure usually recover more quickly and have less pain than with open gallbladder surgery (called open cholecystectomy). Many surgeons recommend a low-fat diet, avoiding fried food in particular, for the first month after surgery.   You can live a full and healthy life without your gallbladder. This includes eating the foods and doing the things you enjoyed before your gallbladder problems started.  Home care  Recommendations for home care include the following:   · Ask someone to drive you to your appointments for the next 3 days. Dont drive until you are no longer taking pain medicine and are able to step on the brake pedal without hesitation.   · Wash the skin around your incision daily with mild soap and water. It's OK to shower the day after your surgery.  · Eat your regular diet. It is wise to stay away from rich, greasy, or spicy food for a few days.  · Remember, it takes at least 1 week for you to get most of your strength and energy back.  · Make an office visit to talk to your healthcare provider if the following symptoms dont go away within a week after your surgery:  ¨ Fatigue  ¨ Pain around the incision  ¨ Diarrhea or constipation  ¨ Loss of appetite     When to call your healthcare provider  Call your healthcare provider immediately if you have any of the following:  · Yellowing of your eyes or skin (jaundice)  · Chills  · Fever of 100.4°F (38.0°C) or higher, or as directed by your healthcare provider   · Redness, swelling, increasing pain, pus, or a foul smell at the incision site  · Dark or rust-colored urine  · Stool that is charles-colored or light in color instead of brown  · Increasing belly pain  · Rectal bleeding  · Leg swelling or shortness of breath   Date Last Reviewed:  7/1/2016 © 2000-2017 [employer]. [address] [place] [address], [place] [address]. All rights reserved. This information is not intended as a substitute for professional medical care. Always follow your healthcare professional's instructions.      Renal Diet Basics   Eating correctly is important for kidney health. People with kidney disease need to monitor intakes of sodium, potassium, and phosphorus especially.       People with kidney disease may need to control several important nutrients. The following information will help you adjust your diet.  Please discuss your specific and individual diet needs with your doctor or dietitian.  Sodium  Sodium is a mineral found in salt (sodium chloride), and it is widely used in food preparation. Salt is one of the most commonly used seasonings, and it takes time to get used to reducing the salt in your diet. However, reducing salt/sodium is an important tool in controlling your kidney disease.  Do not use salt when cooking food.   Do not put salt on food when you eat.   Learn to read food labels. Avoid foods that have more than 300mg sodium per serving (or 600mg for a complete frozen dinner). Avoid foods that have salt in the first 4 or 5 items in the ingredient list.   Do not eat ham, ramey, sausage, hot dogs, lunch meats, chicken tenders or nuggets, or regular canned soup. Only eat soups that have labels saying the sodium level is reduced - and only eat 1 cup - not the whole can.   Canned vegetables should say no salt added.   Do not use flavored salts such as garlic salt, onion salt or seasoned salt. Also, avoid kosher or sea salt.   Be sure to look for lower salt or no salt added options for your favorite foods such as peanut butter or box mixes.   Do not purchase refrigerated or frozen meats that are packaged in a solution; or pre-seasoned/flavored. These items are usually chicken breasts, pork chops, pork tenderloin, steaks, or  corina.    Potassium  Potassium is a mineral involved in how muscles work. When kidneys do not function properly, potassium builds up in the blood. This can cause changes in how the heart beats, possibly even leading to a heart attack. Potassium is found mainly in fruits and vegetables; plus milk and meats. You will need to avoid certain ones and limit the amount of others.  Potassium-rich foods to avoid:  Melons such as cantaloupe and honeydew (watermelon is okay)   Bananas   Oranges and orange juice   Grapefruit juice   Prune juice   Tomatoes, tomato sauce, tomato juice   Dried beans - all kinds   Pumpkin   Winter squash   Cooked greens, spinach, kale, collards, Swiss Chard  Other foods to avoid include bran cereals, granola, salt substitute or lite salt, molasses. Potatoes and sweet potatoes need special handling to allow you to eat them in SMALL amounts. Peel them, cut them in small slices or cubes and soak them for several hours in a large amount of water. When you are ready to cook them, pour the soaking water off and use a large amount of water in the pan. Drain this water before you prepare them to eat.  Be sure to eat a wide variety of fruits and vegetables every day to avoid getting too much potassium.  Phosphorus  Phosphorus is another mineral that can build up in your blood when your kidneys dont work properly. When this happens, calcium can be pulled from your bones and can collect in your skin or blood vessels. Bone disease can then become a problem, making you more likely to have a bone break.  Dairy foods are the major source of phosphorus in the diet, so limit milk to 1 cup per day. If you use yogurt or cheese instead of liquid milk - only one container OR 1 ounce a day!   Some vegetables also contain phosphorus. Limit these to 1 cup per WEEK: dried beans, greens, broccoli, mushrooms, and [place] sprouts.   Certain cereals need to be limited to 1 serving a week: bran, wheat cereals, oatmeal,  and granola.   White bread is better than whole grain breads or crackers.   Soft drinks contain phosphorus so only drink clear ones. Do not drink Mountain Dew® (any kind), ifrah, root beers, Dr. Pepper® (any kind). Also, avoid Hawaiian Punch®, Fruitworks®, Cool® iced tea, and Aquafina® tangerine pineapple.   Beer also has phosphorus - avoid all kinds.      Diabetic diet definition and facts  There is no single diabetes diet, meal plan, or diet that is diabetes-friendly that can serve as a correct meal plan for all patients with diabetes (type 2, gestational, or type 1 diabetes).   Glycemic index, carbohydrate counting, the MyPlate method, and the TLC diet plan are all methods for determining healthy eating habits for diabetes management.   The exact type and times of meals on a diabetic meal plan depend upon a person's age and gender, how much exercise you get and your activity level, and the need to gain, lose, or maintain optimal weight.   Table sugar and alcohol are acceptable in moderation for many patients with diabetes.   Most diabetic meal plans allow the person with diabetes to eat the same foods as the rest of the family, with attention to portion size and timing of meals and snacks.   Eating a high-fiber diet can help improve blood cholesterol and blood sugar levels in patients with type 2 diabetes.   Glycemic index is a way to classify carbohydrates in terms of the amount that they raise blood sugar. High glycemic index foods raise blood sugar more than lower index foods.   Some patients with type 2 use supplements as complementary medicine to treat their disease. However, there is limited evidence on the effectiveness of supplements in treating the disease.    What is a diabetic diet?  A diabetes meal plan (diabetes diet) is a nutritional guide for people with diabetes that helps them decide when to consume meals and snacks as well as what type of foods to eat. There is no one predetermined diabetes diet  pt. intubated/other (specify) that works for all people with diabetes. The goal of any diabetic meal plan is to achieve and maintain good control over the disease, including control of blood glucose and blood lipid levels as well as to maintaining a healthy weight and good nutrition.  Health care professionals and nutritionists can offer advice to help you create the best meal plan to manage your diabetes. Nutritionists can help you find recipes and cooking tips to help with meal planning and preparation.    Are there diabetic diet guidelines?  There is no single diabetic diet that is appropriate for all people with type 2, gestational, or type 1 diabetes just as there is no single medication regimen that is appropriate for everyone with this disease. Dietary choices depends upon many factors including your age and gender, overall exercise and activity level, any medications you may be taking (including insulin or others), and whether or not you are trying to lose weight, among other factors.  Some meal planning tools and guidelines include:   The plate method (MyPlate)   Glycemic index   Counting carbohydrates   Most doctors and health care professionals agree that patients with diabetes can eat most of the same foods and meals as the rest of the family with some added attention to timing of meals and portion sizes. As in any healthy diet, it is best to consume a variety of foods. There are numerous recipes and apps if you need ideas for healthy foods to eat.  Healthy eating includes eating a wide variety of foods including:  Vegetables   Whole grains   Fruits   Non-fat dairy products   Beans   Lean meats, poultry, and fish  One example of a diabetic meal plan for people who also have elevated cholesterol levels is known as the TLC (Therapeutic Lifestyle Changes) plan. This meal plan is designed to help you manage your disease and by lowering your cholesterol level and helping you lose weight. The TLC diet is defined as follows:  Limit fat to  25%-35% of total daily calories, getting no more than 7% of your daily calories from saturated fat, 10% or less from polyunsaturated fats, and up to 20% from monounsaturated fats (like plant oils or nuts).   Carbohydrates should account for no more than 50%-60% of your daily calories.   Try to eat 20-30 grams of fiber each day.   Allow 15%-20% of your daily calories for protein.   Limit cholesterol to 200 milligrams per day.  Quick GuideDiabetes Diet: Healthy Meal Plans for Diabetes-  How many carbs, fats, and proteins can I eat on a healthy diabetic meal plan?  The number of carbohydrates (carbs), fats, and proteins in your plan will depend upon the ideal number of calories you should consume each day. Your age, gender, the amount of exercise you get, and your activity level affect the number of calories you need to eat in order to gain, lose, or maintain a healthy weight.  A high-fiber diet has been shown to improve blood sugar and cholesterol levels in people with type 2 diabetes. Fiber can be found in many foods, especially whole grains, beans, vegetables, nuts, and fruits.   Can I have sugar on a diabetic meal plan?  Most doctors and other medical or health care professional believe that people on a diabetic diet can have small amounts of sugar, so long as they are part of a healthy and balanced nutrition strategy. Table sugar does not raise blood glucose more than starches.    Can I have alcohol on a diabetic diet?  It may be OK for some people with diabetes to drink alcohol in moderation. It is best to drink alcohol when your blood sugar levels are under good control, and it is important to remember that wine and mixed drinks contain sugar, and alcohol has a lot of calories. Your doctor or health care professional can tell you if alcohol can be a safe part of your meal plan.    What foods raise blood sugar levels?  The extent to which carbohydrates raise blood sugar levels is known as their glycemic index.  High glycemic index foods raise glucose levels faster and to a greater degree than low glycemic index foods.   High glycemic index foods include:  White bread, bagels   Short-grain white rice   Corn flakes or puffed rice cereal   Russet potatoes   Saltine crackers, pretzels, rice cakes   Pumpkin   Melons   Pineapple   Popcorn    What foods help maintain good blood sugar levels?  These foods can fill you up without dramatic rises in blood glucose levels, for example:  100% stone-ground whole wheat or pumpernickel bread   Rolled or steel-cut oatmeal   Converted rice, barley, bulgur   Sweet potato, corn, yam, lima/butter beans, peas, legumes and lentils   Many fruits   Non-starchy vegetables (these contain fewer carbohydrates than starchy vegetables)  Proteins and fiber can also help you feel full without raising blood sugar levels as much as carbohydrates.        General Information:    1.  Do not drink alcoholic beverages including beer for 24 hours or as long as you are on pain medication..  2.  Do not drive a motor vehicle, operate machinery or power tools, or signs legal papers for 24 hours or as long as you are on pain medication.   3.  You may experience light-headedness, dizziness, and sleepiness following surgery. Please do not stay alone. A responsible adult should be with you for this 24 hour period.  4.  Go home and rest.    5. Progress slowly to a normal diet unless instructed.  Otherwise, begin with liquids such as soft drinks, then soup and crackers working up to solid foods. Drink plenty of nonalcoholic fluids.  6.  Certain anesthetics and pain medications produce nausea and vomiting in certain       individuals. If nausea becomes a problem at home, call you doctor.    7. A nurse will be calling you sometime after surgery. Do not be alarmed. This is our way of finding out how you are doing.    8. Several times every hour while you are awake, take 2-3 deep breaths and cough. If you had stomach surgery  hold a pillow or rolled towel firmly against your stomach before you cough. This will help with any pain the cough might cause.  9. Several times every hour while you are awake, pump and flex your feet 5-6 times and do foot circles. This will help prevent blood clots.    10.Call your doctor for severe pain, bleeding, fever, or signs or symptoms of infection (pain, swelling, redness, foul odor, drainage).      Using Opioids for Pain Management     Your doctor has given instructions for you to take an opioid.  This is a drug for bad pain.  It helps control pain without causing bleeding and kidney problems.  Common opioid names are morphine, hydromorphone, oxycodone, and methadone. These drugs are called narcotics.    There are several safety concerns you need to know.     · It is against the law to give or sell this drug to another person.  You must keep this medicine safely locked.    · You may have side effects from taking this medication.  These include nausea, itching, sweating, sleepiness, a change in your ability to breathe, and depression.  · Do not take alcohol or sleeping pills opioids.    · Long-term opoid use may no longer giver you relief from pain.  It can cause you stomach pain, mental anxiety, and headaches.  Long-term opoid use can potentially lead to unlawful street drug abuse and reduce your ability to stay employed.    · Your body may become opioid tolerant if you need to take more to get relief.    · You must stop taking opioids if you begin having more pain as a result of the medicine.    · Opioid withdrawal occurs when you have to stop taking the drug.  It can cause you to have nausea, vomiting, diarrhea, stomach pain, anxiety, and dilated pupils in your eyes. This condition means you are opioid dependent.    · Addiction is a drug induced brain disease. It means there are changes in how your brain is working.  Children, teens, and young adults under 25 years old are more likely to get addicted to  opioids.      · Addiction can happen with repeated opioid use.  It does not happen with short-term use of two weeks or less.       For more information, please speak with your doctor or pharmacist.        Post op instructions for prevention of DVT  What is deep vein thrombosis?  Deep vein thrombosis (DVT) is the medical term for blood clots in the deep veins of the leg.  These blood clots can be dangerous.  A DVT can block a blood vessel and keep blood from getting where it needs to go.  Another problem is that the clot can travel to other parts of the body such as the lungs.  A clot that travels to the lungs is called a pulmonary embolus (PE) and can cause serious problems with breathing which can lead to death.  Am I at risk for DVT/PE?  If you are not very active, you are at risk of DVT.  Anyone confined to bed, sitting for long periods of time, recovering from surgery, etc. increases the risk of DVT.  Other risk factors are cancer diagnosis, certain medications, estrogen replacement in any form,older age, obesity, pregnancy, smoking, history of clotting disorders, and dehydration.  How will I know if I have a DVT?   Swelling in the lower leg   Pain   Warmth, redness, hardness or bulging of the vein  If you have any of these symptoms, call your doctors office right away.  Some people will not have any symptoms until the clot moves to the lungs.  What are the symptoms of a PE?   Panting, shortness of breath, or trouble breathing   Sharp, knife-like chest pain when you breathe   Coughing or coughing up blood   Rapid heartbeat  If you have any of these symptoms or get worse quickly, call 911 for emergency treatment.  How can I prevent a DVT?   Avoid long periods of inactivity and dont cross your legs--get up and walk around every hour or so.   Stay active--walking after surgery is highly encouraged.  This means you should get out of the house and walk in the neighborhood.  Going up and down stairs will  "not impair healing (unless advised against such activity by your doctor).     Drink plenty of noncaffeinated, nonalcoholic fluids each day to prevent dehydration.   Wear special support stockings, if they have been advised by your doctor.   If you travel, stop at least once an hour and walk around.   Avoid smoking (assistance with stopping is available through your healthcare provider)  Always notify your doctor if you are not able to follow the post operative instructions that are given to you at the time of discharge.  It may be necessary to prescribe one of the medications available to prevent DVT.      Discharge Instructions: After Your Surgery/Procedure  Youve just had surgery. During surgery you were given medicine called anesthesia to keep you relaxed and free of pain. After surgery you may have some pain or nausea. This is common. Here are some tips for feeling better and getting well after surgery.     Stay on schedule with your medication.   Going home  Your doctor or nurse will show you how to take care of yourself when you go home. He or she will also answer your questions. Have an adult family member or friend drive you home.      For your safety we recommend these precaution for the first 24 hours after your procedure:  · Do not drive or use heavy equipment.  · Do not make important decisions or sign legal papers.  · Do not drink alcohol.  · Have someone stay with you, if needed. He or she can watch for problems and help keep you safe.  · Your concentration, balance, coordination, and judgement may be impaired for many hours after anesthesia.  Use caution when ambulating or standing up.     · You may feel weak and "washed out" after anesthesia and surgery.      Subtle residual effects of general anesthesia or sedation with regional / local anesthesia can last more than 24 hours.  Rest for the remainder of the day or longer if your Doctor/Surgeon has advised you to do so.  Although you may feel " normal within the first 24 hours, your reflexes and mental ability may be impaired without you realizing it.  You may feel dizzy, lightheaded or sleepy for 24 hours or longer.      Be sure to go to all follow-up visits with your doctor. And rest after your surgery for as long as your doctor tells you to.  Coping with pain  If you have pain after surgery, pain medicine will help you feel better. Take it as told, before pain becomes severe. Also, ask your doctor or pharmacist about other ways to control pain. This might be with heat, ice, or relaxation. And follow any other instructions your surgeon or nurse gives you.  Tips for taking pain medicine  To get the best relief possible, remember these points:  · Pain medicines can upset your stomach. Taking them with a little food may help.  · Most pain relievers taken by mouth need at least 20 to 30 minutes to start to work.  · Taking medicine on a schedule can help you remember to take it. Try to time your medicine so that you can take it before starting an activity. This might be before you get dressed, go for a walk, or sit down for dinner.  · Constipation is a common side effect of pain medicines. Call your doctor before taking any medicines such as laxatives or stool softeners to help ease constipation. Also ask if you should skip any foods. Drinking lots of fluids and eating foods such as fruits and vegetables that are high in fiber can also help. Remember, do not take laxatives unless your surgeon has prescribed them.  · Drinking alcohol and taking pain medicine can cause dizziness and slow your breathing. It can even be deadly. Do not drink alcohol while taking pain medicine.  · Pain medicine can make you react more slowly to things. Do not drive or run machinery while taking pain medicine.  Your health care provider may tell you to take acetaminophen to help ease your pain. Ask him or her how much you are supposed to take each day. Acetaminophen or other pain  relievers may interact with your prescription medicines or other over-the-counter (OTC) drugs. Some prescription medicines have acetaminophen and other ingredients. Using both prescription and OTC acetaminophen for pain can cause you to overdose. Read the labels on your OTC medicines with care. This will help you to clearly know the list of ingredients, how much to take, and any warnings. It may also help you not take too much acetaminophen. If you have questions or do not understand the information, ask your pharmacist or health care provider to explain it to you before you take the OTC medicine.  Managing nausea  Some people have an upset stomach after surgery. This is often because of anesthesia, pain, or pain medicine, or the stress of surgery. These tips will help you handle nausea and eat healthy foods as you get better. If you were on a special food plan before surgery, ask your doctor if you should follow it while you get better. These tips may help:  · Do not push yourself to eat. Your body will tell you when to eat and how much.  · Start off with clear liquids and soup. They are easier to digest.  · Next try semi-solid foods, such as mashed potatoes, applesauce, and gelatin, as you feel ready.  · Slowly move to solid foods. Dont eat fatty, rich, or spicy foods at first.  · Do not force yourself to have 3 large meals a day. Instead eat smaller amounts more often.  · Take pain medicines with a small amount of solid food, such as crackers or toast, to avoid nausea.     Call your surgeon if  · You still have pain an hour after taking medicine. The medicine may not be strong enough.  · You feel too sleepy, dizzy, or groggy. The medicine may be too strong.  · You have side effects like nausea, vomiting, or skin changes, such as rash, itching, or hives.       If you have obstructive sleep apnea  You were given anesthesia medicine during surgery to keep you comfortable and free of pain. After surgery, you may  have more apnea spells because of this medicine and other medicines you were given. The spells may last longer than usual.   At home:  · Keep using the continuous positive airway pressure (CPAP) device when you sleep. Unless your health care provider tells you not to, use it when you sleep, day or night. CPAP is a common device used to treat obstructive sleep apnea.  · Talk with your provider before taking any pain medicine, muscle relaxants, or sedatives. Your provider will tell you about the possible dangers of taking these medicines.  © 8141-7849 The [employer]. [address] [place] [id number]. All rights reserved. This information is not intended as a substitute for professional medical care. Always follow your healthcare professional's instructions.

## 2018-08-24 ENCOUNTER — PROCEDURE VISIT (OUTPATIENT)
Dept: UROGYNECOLOGY | Facility: CLINIC | Age: 73
End: 2018-08-24
Payer: MEDICARE

## 2018-08-24 VITALS
HEIGHT: 63 IN | WEIGHT: 254.19 LBS | BODY MASS INDEX: 45.04 KG/M2 | HEART RATE: 91 BPM | SYSTOLIC BLOOD PRESSURE: 149 MMHG | DIASTOLIC BLOOD PRESSURE: 70 MMHG

## 2018-08-24 DIAGNOSIS — R39.89 URETHRAL PAIN: ICD-10-CM

## 2018-08-24 DIAGNOSIS — R35.0 URINARY FREQUENCY: Primary | ICD-10-CM

## 2018-08-24 PROCEDURE — 81002 URINALYSIS NONAUTO W/O SCOPE: CPT | Mod: PBBFAC,PO | Performed by: OBSTETRICS & GYNECOLOGY

## 2018-08-24 PROCEDURE — 52000 CYSTOURETHROSCOPY: CPT | Mod: S$PBB,,, | Performed by: OBSTETRICS & GYNECOLOGY

## 2018-08-24 PROCEDURE — 52000 CYSTOURETHROSCOPY: CPT | Mod: PBBFAC,PO | Performed by: OBSTETRICS & GYNECOLOGY

## 2018-08-24 NOTE — Clinical Note
Juanito Singh, Please help this patient submit a urine cytology in about one month. Thanks, Beverly RODRIGUEZ

## 2018-08-27 NOTE — PROCEDURES
Date: 8/26/2018    Title of Operation:   Cystourethroscopy.     INDICATIONS:  Fransisca Frazier is a 72 y.o. female who was referred by SARA Joshi and Dr. Thompson for evaluation of urinary urgency and frequency. She has a history of IC last cysto more than 20 years ago. She has a history of gross hematuria and an ongoing diagnosis of urethritis. No recent blood noted.  She denies any bladder pain, increased frequency, urgency or any real dysuria.  She just knows that when she moves a certain way her urethra is tender. She has the Estring which she feels has seen helping her.  Overall her urethral pain has been much improved with the amitriptyline, although she does have flares.       PREOPERATIVE DIAGNOSIS  1. Gross hematuria   2. urethral pain  3.  Anterior vaginal wall prolapse   4. History of recurrent UTI.     POSTOPERATIVE DIAGNOSIS:   1. Gross hematuria   2. urethral pain  3.  Anterior vaginal wall prolapse   4. History of recurrent UTI.    Anesthesia:   2% Xylocaine gel.    Specimen (Bacteriological, Pathological or other):   None.     Prosthetic Device/Implant:   None.     Surgeons Narrative:     After informed consent was obtained, the patient was placed in the lithotomy position. The urethral meatus was prepped with Betadine and 10 cubic centimeters of 2% Xylocaine gel were introduced into the urethra small urethral caruncle noted. The anterior vaginal wall was reduced for entry. A flexible cystourethroscope was introduced into the bladder. The bladder was distended with approximately 300 cubic centimeters of sterile water. A systematic survey was performed in which the bladder was surveyed using multiple sequential passes in a clockwise fashion from the bladder dome to the bladder base to the urethrovesical junction. The trigone and ureteral orifices were observed. The scope was then flipped back on itself, and the urethrovesical junction was viewed. A vaginal examining finger was then placed with pressure  suburethrally at the urethrovesical junction with a large cystocele noted as the telescope was withdrawn in order to perform positive pressure urethroscopy with decreased coaptation noted at the proximally where the bladder neck was noted to be widened   Standard maneuvers of cough, squeeze and Valsalva were performed. The telescope was then completely withdrawn.     Findings: Urethroscopy:  small urethral caruncle, decreased coaptation, which may indicate ISD otherwise, Normal.  Cystoscopy:  Normal bladder mucosa, bilateral ureteral flow was noted.      Assessment: Essentially normal cystourethroscopy, no evidence of granulation or petechia typically noted with IC. Urethra with small urethral caruncle.        Plan:  Urine cytology  Would recommend mycoplasma/ ureaplasma evaluation the next time she has urethral pain  May use Uribel PRN  Continue Estring and Elavil   May benefit from a IC ring if not already using   The patient will return to the care of SARA Joshi and Dr. Thompson and may follow up with Dr. Patel as needed

## 2018-08-27 NOTE — TELEPHONE ENCOUNTER
Scheduled for pt to drop off a urine in 1 month, will have patient come in tomorrow to check for urine test. Will come in for nurse visit.

## 2018-08-27 NOTE — TELEPHONE ENCOUNTER
----- Message from Geno Britton sent at 8/27/2018 11:32 AM CDT -----  Contact: patient  Type: Needs Medical Advice    Who Called:  patient  Symptoms (please be specific):    How long has patient had these symptoms:    Pharmacy name and phone #:  [facility]  Best Call Back Number: [phone]  Additional Information: called to advise that medication went to wrong pharmacy [facility],Rx methen-m.blue-s.phos-phsal-hyo (URIBEL) 118-10-40.8-36 mg [facility] to advise when this is done.

## 2018-08-27 NOTE — TELEPHONE ENCOUNTER
Katya called and states that she wants the uribel sent to [facility] instead of [facility]. Dr Guerrero stated that she can take PRN, had only dispensed 40 for the month  With directions up to 4 times a day.  Dr Guerrero is off today , can you send in [facility] not [facility]  The quantity to put in there for the mail order.

## 2018-08-28 ENCOUNTER — CLINICAL SUPPORT (OUTPATIENT)
Dept: UROGYNECOLOGY | Facility: CLINIC | Age: 73
End: 2018-08-28
Payer: MEDICARE

## 2018-08-28 ENCOUNTER — APPOINTMENT (OUTPATIENT)
Dept: LAB | Facility: HOSPITAL | Age: 73
End: 2018-08-28
Attending: OBSTETRICS & GYNECOLOGY
Payer: MEDICARE

## 2018-08-28 ENCOUNTER — TELEPHONE (OUTPATIENT)
Dept: UROGYNECOLOGY | Facility: CLINIC | Age: 73
End: 2018-08-28

## 2018-08-28 DIAGNOSIS — R39.9 UTI SYMPTOMS: Primary | ICD-10-CM

## 2018-08-28 PROCEDURE — 99999 PR PBB SHADOW E&M-EST. PATIENT-LVL I: CPT | Mod: PBBFAC,,, | Performed by: NURSE PRACTITIONER

## 2018-08-28 PROCEDURE — 87077 CULTURE AEROBIC IDENTIFY: CPT

## 2018-08-28 PROCEDURE — 87088 URINE BACTERIA CULTURE: CPT

## 2018-08-28 PROCEDURE — 99211 OFF/OP EST MAY X REQ PHY/QHP: CPT | Mod: PBBFAC,PO | Performed by: NURSE PRACTITIONER

## 2018-08-28 PROCEDURE — 87186 SC STD MICRODIL/AGAR DIL: CPT

## 2018-08-28 PROCEDURE — 81002 URINALYSIS NONAUTO W/O SCOPE: CPT | Mod: PBBFAC,PO | Performed by: NURSE PRACTITIONER

## 2018-08-28 PROCEDURE — 87086 URINE CULTURE/COLONY COUNT: CPT

## 2018-08-28 RX ORDER — SULFAMETHOXAZOLE AND TRIMETHOPRIM 800; 160 MG/1; MG/1
1 TABLET ORAL 2 TIMES DAILY
Qty: 10 TABLET | Refills: 0 | Status: SHIPPED | OUTPATIENT
Start: 2018-08-28 | End: 2018-09-02

## 2018-08-28 NOTE — PROGRESS NOTES
Spoke to pt the other day and states that she feels like she was getting a UTI, had urgency and burning, dropped off urine and will send for culture. Called and informed of UTI and verified that she wanted the [facility] near [facility].

## 2018-08-28 NOTE — TELEPHONE ENCOUNTER
Pt was having UTI symptoms.  She stopped by the office to leave a urine sample.  The UA is suspicious for UTI, so we will send for culture and I'm sending in antibiotics for her to take.

## 2018-08-31 LAB — BACTERIA UR CULT: NORMAL

## 2018-09-06 ENCOUNTER — OFFICE VISIT (OUTPATIENT)
Dept: UROGYNECOLOGY | Facility: CLINIC | Age: 73
End: 2018-09-06
Payer: MEDICARE

## 2018-09-06 VITALS
HEIGHT: 63 IN | BODY MASS INDEX: 45 KG/M2 | HEART RATE: 99 BPM | WEIGHT: 254 LBS | SYSTOLIC BLOOD PRESSURE: 174 MMHG | DIASTOLIC BLOOD PRESSURE: 74 MMHG

## 2018-09-06 DIAGNOSIS — N34.2 URETHRITIS: ICD-10-CM

## 2018-09-06 DIAGNOSIS — R35.0 URINARY FREQUENCY: Primary | ICD-10-CM

## 2018-09-06 DIAGNOSIS — Z46.89 PESSARY MAINTENANCE: ICD-10-CM

## 2018-09-06 DIAGNOSIS — N81.11 CYSTOCELE, MIDLINE: ICD-10-CM

## 2018-09-06 DIAGNOSIS — N95.2 ATROPHIC VAGINITIS: ICD-10-CM

## 2018-09-06 DIAGNOSIS — Z87.440 HISTORY OF RECURRENT UTI (URINARY TRACT INFECTION): ICD-10-CM

## 2018-09-06 LAB
BILIRUB SERPL-MCNC: NORMAL MG/DL
BLOOD URINE, POC: NORMAL
COLOR, POC UA: NORMAL
GLUCOSE UR QL STRIP: NORMAL
KETONES UR QL STRIP: NORMAL
LEUKOCYTE ESTERASE URINE, POC: NORMAL
NITRITE, POC UA: NORMAL
PH, POC UA: 5
PROTEIN, POC: NORMAL
SPECIFIC GRAVITY, POC UA: 1.01
UROBILINOGEN, POC UA: NORMAL

## 2018-09-06 PROCEDURE — 81002 URINALYSIS NONAUTO W/O SCOPE: CPT | Mod: PBBFAC,PO | Performed by: NURSE PRACTITIONER

## 2018-09-06 PROCEDURE — 99213 OFFICE O/P EST LOW 20 MIN: CPT | Mod: S$PBB,,, | Performed by: NURSE PRACTITIONER

## 2018-09-06 PROCEDURE — 99999 PR PBB SHADOW E&M-EST. PATIENT-LVL III: CPT | Mod: PBBFAC,,, | Performed by: NURSE PRACTITIONER

## 2018-09-06 PROCEDURE — 99213 OFFICE O/P EST LOW 20 MIN: CPT | Mod: PBBFAC,PO | Performed by: NURSE PRACTITIONER

## 2018-09-06 RX ORDER — TETANUS TOXOID, REDUCED DIPHTHERIA TOXOID AND ACELLULAR PERTUSSIS VACCINE, ADSORBED 5; 2.5; 8; 8; 2.5 [IU]/.5ML; [IU]/.5ML; UG/.5ML; UG/.5ML; UG/.5ML
SUSPENSION INTRAMUSCULAR
Refills: 0 | COMMUNITY
Start: 2018-07-28 | End: 2019-02-11

## 2018-09-06 NOTE — PROGRESS NOTES
Subjective:       Patient ID: Fransisca Frazier is a 72 y.o. female.    Chief Complaint: pessary    HPI  Fransisca Frazier is a 72 y.o. female who present today for routine estring change.  She recently had a cystourethroscopy with Dr. Guerrero which was fairly normal.  She did have symptoms of UTI on 08/28/18 which urine culture showed was positive for Klebsiella Pneumoniae.  She was given bactrim and is feeling better.  It was discussed at one time to do a trial of daily prophylactic antibiotics, but the medication was not sent to the correct pharmacy and by the time she received the prophylactic antibiotic she had other health concerns that took precedent and she never began it.  As she sits her today she is feeling fairly well.  She denies any LUTS.  She feels that her last UTI has cleared.  She denies any vaginal complaints.  She is ready for the estring.    Review of Systems   Constitutional: Negative for activity change, fever and unexpected weight change.   HENT: Negative for hearing loss.    Eyes: Negative for visual disturbance.   Respiratory: Negative for shortness of breath and wheezing.    Cardiovascular: Negative for chest pain, palpitations and leg swelling.   Gastrointestinal: Negative for abdominal pain, constipation and diarrhea.   Genitourinary: Negative for dyspareunia, dysuria, frequency, urgency, vaginal bleeding and vaginal discharge.   Musculoskeletal: Negative for gait problem and neck pain.   Skin: Negative for rash and wound.   Allergic/Immunologic: Negative for immunocompromised state.   Neurological: Negative for tremors, speech difficulty and weakness.   Hematological: Does not bruise/bleed easily.   Psychiatric/Behavioral: Negative for agitation and confusion.       Objective:      Physical Exam   Constitutional: She is oriented to person, place, and time. She appears well-developed and well-nourished. No distress.   HENT:   Head: Normocephalic and atraumatic.   Neck: Neck supple. No  thyromegaly present.   Pulmonary/Chest: Effort normal. No respiratory distress.   Abdominal: Soft. There is no tenderness. No hernia.   Musculoskeletal: Normal range of motion.   Neurological: She is alert and oriented to person, place, and time.   Skin: Skin is warm and dry. No rash noted.   Psychiatric: She has a normal mood and affect. Her behavior is normal.     Pelvic Exam:  V: No lesions. No palpable nodes.   Va: no discharge or bleeding.  Good length.  Dominant midline cystocele Ba=-1  Meatus:No caruncle or stenosis  Urethra: Non tender. No suburethral masses.  Cx/Cuff: Normal   Uterus: small, non-tender  Ad: No mass or tenderness.  Levators :Symmetrical. Normal tone. Non tender.  BL: Non tender  RV: No hemorrhoids.      Assessment:       1. Urinary frequency    2. Cystocele, midline    3. Pessary maintenance    4. Atrophic vaginitis    5. History of recurrent UTI (urinary tract infection)    6. Urethritis          Procedure note- estring pessary removed with forceps and discarded.  After betadine irrigation of the vagina, a new estring pessary, which the pt provided was reinserted into the vagina.  Pt ambulated in the room and denies any discomfort.  NP reminded pt that the first 24-48 hours are the most likely time for the pessary to fall out and to monitor the toilet before flushing.  Pt verbalized understanding.      Plan:       Urinary frequency- monitor    Cystocele, midline- continue with estring    Pessary maintenance- continue with estring    Atrophic vaginitis- continue with estring    History of recurrent UTI (urinary tract infection)- monitor at this time.  We discussed daily prophylaxis, but there is some questionable kidney disease and her last positive urine culture before the 08/28/18 was 12/2017.    Urethritis- improved, monitor    RTC 3 months for estring

## 2018-09-10 ENCOUNTER — TELEPHONE (OUTPATIENT)
Dept: FAMILY MEDICINE | Facility: CLINIC | Age: 73
End: 2018-09-10

## 2018-09-27 PROCEDURE — 88112 CYTOPATH CELL ENHANCE TECH: CPT | Performed by: PATHOLOGY

## 2018-09-27 PROCEDURE — 88112 CYTOPATH CELL ENHANCE TECH: CPT | Mod: 26,,, | Performed by: PATHOLOGY

## 2018-12-04 ENCOUNTER — OFFICE VISIT (OUTPATIENT)
Dept: FAMILY MEDICINE | Facility: CLINIC | Age: 73
End: 2018-12-04
Payer: MEDICARE

## 2018-12-04 VITALS
HEIGHT: 63 IN | DIASTOLIC BLOOD PRESSURE: 70 MMHG | HEART RATE: 72 BPM | TEMPERATURE: 98 F | OXYGEN SATURATION: 97 % | BODY MASS INDEX: 46.07 KG/M2 | SYSTOLIC BLOOD PRESSURE: 134 MMHG | WEIGHT: 260 LBS | RESPIRATION RATE: 14 BRPM

## 2018-12-04 DIAGNOSIS — Z12.11 SCREEN FOR COLON CANCER: ICD-10-CM

## 2018-12-04 DIAGNOSIS — R73.01 IFG (IMPAIRED FASTING GLUCOSE): Primary | ICD-10-CM

## 2018-12-04 DIAGNOSIS — I10 HYPERTENSION, UNSPECIFIED TYPE: ICD-10-CM

## 2018-12-04 DIAGNOSIS — R60.0 FLUID RETENTION IN LEGS: ICD-10-CM

## 2018-12-04 DIAGNOSIS — M85.852 OSTEOPENIA OF BOTH HIPS: ICD-10-CM

## 2018-12-04 DIAGNOSIS — E78.00 PURE HYPERCHOLESTEROLEMIA: ICD-10-CM

## 2018-12-04 DIAGNOSIS — N30.10 INTERSTITIAL CYSTITIS: Chronic | ICD-10-CM

## 2018-12-04 DIAGNOSIS — K21.9 GERD WITHOUT ESOPHAGITIS: ICD-10-CM

## 2018-12-04 DIAGNOSIS — M85.851 OSTEOPENIA OF BOTH HIPS: ICD-10-CM

## 2018-12-04 LAB — HBA1C MFR BLD: 5.9 %

## 2018-12-04 PROCEDURE — 83036 HEMOGLOBIN GLYCOSYLATED A1C: CPT | Mod: QW,,, | Performed by: INTERNAL MEDICINE

## 2018-12-04 PROCEDURE — 99214 OFFICE O/P EST MOD 30 MIN: CPT | Mod: ,,, | Performed by: INTERNAL MEDICINE

## 2018-12-04 RX ORDER — AMITRIPTYLINE HYDROCHLORIDE 25 MG/1
25 TABLET, FILM COATED ORAL 3 TIMES DAILY
Qty: 270 TABLET | Refills: 1 | Status: SHIPPED | OUTPATIENT
Start: 2018-12-04 | End: 2019-05-13 | Stop reason: SDUPTHER

## 2018-12-04 RX ORDER — FUROSEMIDE 40 MG/1
1 TABLET ORAL DAILY
COMMUNITY
Start: 2018-11-05 | End: 2019-02-11

## 2018-12-04 RX ORDER — HYOSCYAMINE SULFATE 0.12 MG/1
TABLET SUBLINGUAL
Qty: 270 TABLET | Refills: 1 | Status: SHIPPED | OUTPATIENT
Start: 2018-12-04 | End: 2019-05-13 | Stop reason: SDUPTHER

## 2018-12-04 RX ORDER — ERGOCALCIFEROL 1.25 MG/1
1 CAPSULE ORAL WEEKLY
COMMUNITY
Start: 2018-11-05 | End: 2020-01-29

## 2018-12-04 RX ORDER — POTASSIUM CHLORIDE 1500 MG/1
1 TABLET, EXTENDED RELEASE ORAL DAILY
COMMUNITY
Start: 2018-11-05 | End: 2019-02-11

## 2018-12-04 RX ORDER — VALSARTAN 160 MG/1
160 TABLET ORAL DAILY
Qty: 90 TABLET | Refills: 1 | Status: SHIPPED | OUTPATIENT
Start: 2018-12-04 | End: 2019-05-13 | Stop reason: SDUPTHER

## 2018-12-04 RX ORDER — EZETIMIBE 10 MG/1
10 TABLET ORAL NIGHTLY
Qty: 90 TABLET | Refills: 1 | Status: SHIPPED | OUTPATIENT
Start: 2018-12-04 | End: 2019-05-13 | Stop reason: SDUPTHER

## 2018-12-04 RX ORDER — HYDROCHLOROTHIAZIDE 25 MG/1
25 TABLET ORAL 2 TIMES DAILY
Qty: 180 TABLET | Refills: 1 | Status: SHIPPED | OUTPATIENT
Start: 2018-12-04 | End: 2019-05-13 | Stop reason: SDUPTHER

## 2018-12-04 RX ORDER — SUCRALFATE 1 G/1
1 TABLET ORAL 4 TIMES DAILY
Qty: 120 TABLET | Refills: 2 | Status: SHIPPED | OUTPATIENT
Start: 2018-12-04 | End: 2019-05-13

## 2018-12-04 RX ORDER — ALENDRONATE SODIUM 70 MG/1
70 TABLET ORAL
Qty: 12 TABLET | Refills: 1 | Status: SHIPPED | OUTPATIENT
Start: 2018-12-04 | End: 2019-11-01 | Stop reason: SDUPTHER

## 2018-12-04 RX ORDER — PANTOPRAZOLE SODIUM 40 MG/1
40 TABLET, DELAYED RELEASE ORAL DAILY
Qty: 90 TABLET | Refills: 1 | Status: SHIPPED | OUTPATIENT
Start: 2018-12-04 | End: 2019-05-13 | Stop reason: SDUPTHER

## 2018-12-04 NOTE — PROGRESS NOTES
SUBJECTIVE:    Patient ID: Fransisca Frazier is a 73 y.o. female.    Chief Complaint: Hypertension; Chronic Kidney Disease; and Follow-up    HPI     Patient returns for recheck--she is seeing Nephrology-she was referred to  for re-eval of IC and got cysto and got UTI from same-she has to repeat a urine test due to atypical cells found on last urinalysis-she described pulling sensation of the urethra then (June) and sometimes now when she moves-therefore the rec for cystoscopy--    Diabetes-A1C is 5.9-there was no protein in the urine-Glucose  was 105 --BUN 15 and CREAT 1.12--GFR 47cc-LFS normal-Vit D was low and treated with 50,000 weekly and it will be followed up in three months--MARCO was positive 1:160 in a speckled pattern which can be seen in SLE, Sjogrens , PSS, and MCTD-she has extreme dryness of her mouth-CBC was good --H/H 13.4/40.7 WBC 5.8 -    Office Visit on 09/06/2018   Component Date Value Ref Range Status    Color, UA 09/06/2018 y/clr   Final    Spec Grav UA 09/06/2018 1.010   Final    pH, UA 09/06/2018 5   Final    WBC, UA 09/06/2018 neg   Final    Nitrite, UA 09/06/2018 neg   Final    Protein 09/06/2018 neg   Final    Glucose, UA 09/06/2018 neg   Final    Ketones, UA 09/06/2018 neg]   Final    Urobilinogen, UA 09/06/2018 neg   Final    Bilirubin 09/06/2018 neg   Final    Blood, UA 09/06/2018 neg   Final   Clinical Support on 08/28/2018   Component Date Value Ref Range Status    Color, UA 08/28/2018 y clr   Final    Spec Grav UA 08/28/2018 1.010   Final    pH, UA 08/28/2018 5   Final    WBC, UA 08/28/2018 +   Final    Nitrite, UA 08/28/2018 pos   Final    Protein 08/28/2018 trace   Final    Glucose, UA 08/28/2018 neg   Final    Ketones, UA 08/28/2018 neg   Final    Urobilinogen, UA 08/28/2018 neg   Final    Bilirubin 08/28/2018 neg   Final    Blood, UA 08/28/2018 trace   Final    Urine Culture, Routine 08/28/2018    Final                    Value:KLEBSIELLA  PNEUMONIAE  >100,000 cfu/ml     Procedure visit on 08/24/2018   Component Date Value Ref Range Status    Color, UA 08/24/2018 cloudy   Final    Spec Grav UA 08/24/2018 1.010   Final    pH, UA 08/24/2018 5   Final    WBC, UA 08/24/2018 n   Final    Nitrite, UA 08/24/2018 n   Final    Protein 08/24/2018 trace   Final    Glucose, UA 08/24/2018 n   Final    Ketones, UA 08/24/2018 n   Final    Urobilinogen, UA 08/24/2018 n   Final    Bilirubin 08/24/2018 n   Final    Blood, UA 08/24/2018 trace   Final   Office Visit on 06/06/2018   Component Date Value Ref Range Status    Color, UA 06/06/2018 yellow   Final    Spec Grav UA 06/06/2018 1.010   Final    pH, UA 06/06/2018 5   Final    WBC, UA 06/06/2018 neg   Final    Nitrite, UA 06/06/2018 neg   Final    Protein 06/06/2018 neg   Final    Glucose, UA 06/06/2018 neg   Final    Ketones, UA 06/06/2018 neg   Final    Urobilinogen, UA 06/06/2018 neg   Final    Bilirubin 06/06/2018 neg   Final    Blood, UA 06/06/2018 neg   Final       Past Medical History:   Diagnosis Date    Back pain     Cystitis, interstitial     Hypertension     Uncontrolled type 2 diabetes mellitus without complication, without long-term current use of insulin 1/22/2018    Wears glasses     Wears partial dentures     upper     Social History     Socioeconomic History    Marital status:      Spouse name: Not on file    Number of children: Not on file    Years of education: Not on file    Highest education level: Not on file   Social Needs    Financial resource strain: Not on file    Food insecurity - worry: Not on file    Food insecurity - inability: Not on file    Transportation needs - medical: Not on file    Transportation needs - non-medical: Not on file   Occupational History    Not on file   Tobacco Use    Smoking status: Former Smoker     Packs/day: 1.00     Years: 32.00     Pack years: 32.00     Types: Cigarettes     Last attempt to quit: 8/12/1993      "Years since quittin.3    Smokeless tobacco: Never Used   Substance and Sexual Activity    Alcohol use: Yes     Comment: occasional  2018    Drug use: No    Sexual activity: Not on file   Other Topics Concern    Not on file   Social History Narrative    Not on file     Past Surgical History:   Procedure Laterality Date    CHOLECYSTECTOMY  2018    CHOLECYSTECTOMY-LAPAROSCOPIC N/A 2018    [facility]    CYSTOSCOPY      D & C      DILATION AND CURETTAGE OF UTERUS      EXTRACTION-CATARACT-IOL Right 2014    [facility]    EYE SURGERY      bilat cataract     Family History   Problem Relation Age of Onset    Cancer Father     Cancer Sister     Cancer Brother     Cancer Brother      Vitals:    18 1329   BP: 134/70   Pulse: 72   Resp: 14   Temp: 98.2 °F (36.8 °C)   SpO2: 97%   Weight: 117.9 kg (260 lb)   Height: 5' 3" (1.6 m)       Review of Systems   Constitutional: Negative for appetite change, chills, diaphoresis, fatigue, fever and unexpected weight change.   HENT: Negative for congestion (usual sinus drainage), ear pain, hearing loss, nosebleeds, postnasal drip, sinus pressure, sinus pain, sneezing, sore throat, tinnitus, trouble swallowing and voice change.    Eyes: Negative for photophobia, pain, itching and visual disturbance.   Respiratory: Negative for apnea, cough, chest tightness, shortness of breath, wheezing and stridor.    Cardiovascular: Negative for chest pain, palpitations and leg swelling.   Gastrointestinal: Negative for abdominal distention, abdominal pain, blood in stool, constipation, diarrhea, nausea and vomiting.        GERD-ok with protonix-uses carafate prn only   Endocrine: Negative for cold intolerance, heat intolerance, polydipsia and polyuria.   Genitourinary: Negative for difficulty urinating, dyspareunia, dysuria, flank pain, frequency, hematuria, menstrual problem, pelvic pain, " urgency, vaginal discharge and vaginal pain.        HPI   Musculoskeletal: Negative for arthralgias, back pain, joint swelling, myalgias, neck pain and neck stiffness.        Hips bother her   Skin: Negative for pallor.   Allergic/Immunologic: Negative for environmental allergies and food allergies.   Neurological: Negative for dizziness, tremors, speech difficulty, weakness, light-headedness and numbness (tingling right hand).   Hematological: Does not bruise/bleed easily.   Psychiatric/Behavioral: Negative for agitation, confusion, decreased concentration, sleep disturbance and suicidal ideas. The patient is not nervous/anxious.           Objective:      Physical Exam   Constitutional: She is oriented to person, place, and time. She appears well-developed. She is cooperative. No distress.   obese   HENT:   Head: Normocephalic and atraumatic.   Right Ear: Tympanic membrane normal.   Left Ear: Tympanic membrane normal.   Mouth/Throat: Uvula is midline and mucous membranes are normal.   Eyes: Conjunctivae and EOM are normal. Right pupil is round and reactive. Left pupil is round and reactive.   Neck: Trachea normal and normal range of motion. Neck supple. No JVD present. No thyromegaly present.   Cardiovascular: Normal rate, regular rhythm, normal heart sounds and intact distal pulses.   Pulmonary/Chest: Effort normal and breath sounds normal. No tachypnea. No respiratory distress.   Abdominal: Soft. Bowel sounds are normal. There is no tenderness.   obese   Musculoskeletal: Normal range of motion.   Lymphadenopathy:     She has no cervical adenopathy.   Neurological: She is alert and oriented to person, place, and time. She has normal strength.   Skin: Skin is warm and dry. No rash noted.   Psychiatric: She has a normal mood and affect. Her speech is normal.   Nursing note and vitals reviewed.          Assessment:       1. Uncontrolled type 2 diabetes mellitus without complication, without long-term current use of  insulin    2. Hypertension, unspecified type    3. GERD without esophagitis    4. Interstitial cystitis    5. Pure hypercholesterolemia    6. Osteopenia of both hips    7. Screen for colon cancer         Plan:           Uncontrolled type 2 diabetes mellitus without complication, without long-term current use of insulin  -     POCT microalbumin  -     Hemoglobin A1C, POCT    Hypertension, unspecified type  -     valsartan (DIOVAN) 160 MG tablet; Take 1 tablet (160 mg total) by mouth once daily.  Dispense: 90 tablet; Refill: 1    GERD without esophagitis  -     sucralfate (CARAFATE) 1 gram tablet; Take 1 tablet (1 g total) by mouth 4 (four) times daily.  Dispense: 120 tablet; Refill: 2  -     pantoprazole (PROTONIX) 40 MG tablet; Take 1 tablet (40 mg total) by mouth once daily.  Dispense: 90 tablet; Refill: 1    Interstitial cystitis  -     pentosan polysulfate (ELMIRON) 100 mg Cap; Take 1 capsule (100 mg total) by mouth 3 (three) times daily.  Dispense: 270 capsule; Refill: 1  -     hyoscyamine (OSCIMIN SL) 0.125 mg Subl; DISSOLVE 1 TABLET UNDER THE TONGUE THREE TIMES A DAY AS NEEDED  Dispense: 270 tablet; Refill: 1  -     amitriptyline (ELAVIL) 25 MG tablet; Take 1 tablet (25 mg total) by mouth 3 (three) times daily.  Dispense: 270 tablet; Refill: 1    Pure hypercholesterolemia  -     ezetimibe (ZETIA) 10 mg tablet; Take 1 tablet (10 mg total) by mouth every evening.  Dispense: 90 tablet; Refill: 1    Osteopenia of both hips  -     alendronate (FOSAMAX) 70 MG tablet; Take 1 tablet (70 mg total) by mouth every 7 days.  Dispense: 12 tablet; Refill: 1    Screen for colon cancer  -     Cologuard Screening (Multitarget Stool DNA)

## 2018-12-04 NOTE — PATIENT INSTRUCTIONS
The patient is asked to make an attempt to improve diet and exercise patterns to aid in medical management of this problem.-watch carbs try to limit carbs to 30 g a meal

## 2018-12-06 ENCOUNTER — APPOINTMENT (OUTPATIENT)
Dept: LAB | Facility: HOSPITAL | Age: 73
End: 2018-12-06
Attending: NURSE PRACTITIONER
Payer: MEDICARE

## 2018-12-06 ENCOUNTER — OFFICE VISIT (OUTPATIENT)
Dept: UROGYNECOLOGY | Facility: CLINIC | Age: 73
End: 2018-12-06
Payer: MEDICARE

## 2018-12-06 VITALS
BODY MASS INDEX: 46.07 KG/M2 | WEIGHT: 260 LBS | HEART RATE: 88 BPM | DIASTOLIC BLOOD PRESSURE: 79 MMHG | SYSTOLIC BLOOD PRESSURE: 154 MMHG | HEIGHT: 63 IN

## 2018-12-06 DIAGNOSIS — N81.11 CYSTOCELE, MIDLINE: ICD-10-CM

## 2018-12-06 DIAGNOSIS — N95.2 ATROPHIC VAGINITIS: ICD-10-CM

## 2018-12-06 DIAGNOSIS — Z46.89 PESSARY MAINTENANCE: ICD-10-CM

## 2018-12-06 DIAGNOSIS — N34.2 URETHRITIS: ICD-10-CM

## 2018-12-06 DIAGNOSIS — R35.0 URINARY FREQUENCY: Primary | ICD-10-CM

## 2018-12-06 DIAGNOSIS — Z87.440 HISTORY OF RECURRENT UTI (URINARY TRACT INFECTION): ICD-10-CM

## 2018-12-06 LAB
BILIRUB SERPL-MCNC: NORMAL MG/DL
BLOOD URINE, POC: NORMAL
COLOR, POC UA: NORMAL
GLUCOSE UR QL STRIP: NORMAL
KETONES UR QL STRIP: NORMAL
LEUKOCYTE ESTERASE URINE, POC: NORMAL
NITRITE, POC UA: NORMAL
PH, POC UA: 5
PROTEIN, POC: NORMAL
SPECIFIC GRAVITY, POC UA: 1
UROBILINOGEN, POC UA: NORMAL

## 2018-12-06 PROCEDURE — 88112 CYTOPATH CELL ENHANCE TECH: CPT | Performed by: PATHOLOGY

## 2018-12-06 PROCEDURE — 99214 OFFICE O/P EST MOD 30 MIN: CPT | Mod: PBBFAC,PO,25 | Performed by: NURSE PRACTITIONER

## 2018-12-06 PROCEDURE — 81002 URINALYSIS NONAUTO W/O SCOPE: CPT | Mod: PBBFAC,PO | Performed by: NURSE PRACTITIONER

## 2018-12-06 PROCEDURE — 99213 OFFICE O/P EST LOW 20 MIN: CPT | Mod: S$PBB,,, | Performed by: NURSE PRACTITIONER

## 2018-12-06 PROCEDURE — 99999 PR PBB SHADOW E&M-EST. PATIENT-LVL IV: CPT | Mod: PBBFAC,,, | Performed by: NURSE PRACTITIONER

## 2018-12-06 NOTE — PROGRESS NOTES
Subjective:       Patient ID: Fransisca Frazier is a 73 y.o. female.    Chief Complaint: estring    HPI  Fransisca Frazier is a 73 y.o. female who presents today for routine pessary maintenance.  She feels that she is doing fairly well with the estring.  She denies any LUTS.  She denies any real urethral pain at this time.  She denies any vaginal discharge or bleeding.  Overall, she feels she is doing fairly well and is ready to proceed.    Review of Systems   Constitutional: Negative for activity change, fever and unexpected weight change.   HENT: Negative for hearing loss.    Eyes: Negative for visual disturbance.   Respiratory: Negative for shortness of breath and wheezing.    Cardiovascular: Negative for chest pain, palpitations and leg swelling.   Gastrointestinal: Negative for abdominal pain, constipation and diarrhea.   Genitourinary: Positive for frequency. Negative for dyspareunia, dysuria, urgency, vaginal bleeding and vaginal discharge.   Musculoskeletal: Negative for gait problem and neck pain.   Skin: Negative for rash and wound.   Allergic/Immunologic: Negative for immunocompromised state.   Neurological: Negative for tremors, speech difficulty and weakness.   Hematological: Does not bruise/bleed easily.   Psychiatric/Behavioral: Negative for agitation and confusion.       Objective:      Physical Exam   Constitutional: She is oriented to person, place, and time. She appears well-developed and well-nourished. No distress.   HENT:   Head: Normocephalic and atraumatic.   Neck: Neck supple. No thyromegaly present.   Pulmonary/Chest: Effort normal. No respiratory distress.   Abdominal: Soft. There is no tenderness. No hernia.   Musculoskeletal: Normal range of motion.   Neurological: She is alert and oriented to person, place, and time.   Skin: Skin is warm and dry. No rash noted.   Psychiatric: She has a normal mood and affect. Her behavior is normal.     Pelvic Exam:  V: No lesions. No palpable nodes.   Va:  small amount of thin white discharge.  No bleeding noted.  Good length.  Dominant midline cystocele Ba=-1  Meatus:No caruncle or stenosis  Urethra: Non tender. No suburethral masses.  Cx/Cuff: Normal   Uterus: small, non-tender  Ad: No mass or tenderness.  Levators :Symmetrical. Normal tone. Non tender.  BL: Non tender  RV: No hemorrhoids.      Assessment:       1. Urinary frequency    2. Cystocele, midline    3. Pessary maintenance    4. Atrophic vaginitis    5. History of recurrent UTI (urinary tract infection)    6. Urethritis          Procedure note- estring pessary removed with forceps and discarded. After betadine irrigation of the vagina, a new estring pessary, which the pt provided, was reinserted into the vagina.  Pt ambulated in the room and denies any discomfort.  NP reminded pt that the first 24-48 hours are the most likely time for the pessary to fall out and to monitor the toilet before flushing.  Pt verbalized understanding.      Plan:       Urinary frequency- we are repeating the urine cytology because her last cytology showed cell atypia.  -     POCT URINE DIPSTICK WITHOUT MICROSCOPE  -     Cytology, urine    Cystocele, midline- continue with estring    Pessary maintenance- as noted above    Atrophic vaginitis - continue with estring    History of recurrent UTI (urinary tract infection)- monitor    Urethritis- doing well at this time, monitor    RTC 3 months or PRN

## 2019-01-07 ENCOUNTER — PATIENT MESSAGE (OUTPATIENT)
Dept: FAMILY MEDICINE | Facility: CLINIC | Age: 74
End: 2019-01-07

## 2019-02-11 ENCOUNTER — OFFICE VISIT (OUTPATIENT)
Dept: FAMILY MEDICINE | Facility: CLINIC | Age: 74
End: 2019-02-11
Payer: MEDICARE

## 2019-02-11 VITALS
SYSTOLIC BLOOD PRESSURE: 130 MMHG | OXYGEN SATURATION: 98 % | BODY MASS INDEX: 43.94 KG/M2 | HEART RATE: 76 BPM | WEIGHT: 248 LBS | HEIGHT: 63 IN | DIASTOLIC BLOOD PRESSURE: 72 MMHG | TEMPERATURE: 98 F | RESPIRATION RATE: 14 BRPM

## 2019-02-11 DIAGNOSIS — Z12.39 BREAST CANCER SCREENING: ICD-10-CM

## 2019-02-11 DIAGNOSIS — R10.11 RUQ PAIN: ICD-10-CM

## 2019-02-11 DIAGNOSIS — R10.10 PAIN OF UPPER ABDOMEN: Primary | ICD-10-CM

## 2019-02-11 DIAGNOSIS — M48.061 SPINAL STENOSIS OF LUMBAR REGION WITHOUT NEUROGENIC CLAUDICATION: ICD-10-CM

## 2019-02-11 DIAGNOSIS — R73.01 IFG (IMPAIRED FASTING GLUCOSE): ICD-10-CM

## 2019-02-11 DIAGNOSIS — E55.9 VITAMIN D DEFICIENCY: ICD-10-CM

## 2019-02-11 LAB
AMYLASE SERPL-CCNC: 76 U/L (ref 28–100)
LIPASE SERPL-CCNC: 28 U/L (ref 0–38)
VITAMIN D, 1,25 (OH)2: 46.8 NG/ML (ref 30–100)

## 2019-02-11 PROCEDURE — 99214 OFFICE O/P EST MOD 30 MIN: CPT | Mod: 25,,, | Performed by: INTERNAL MEDICINE

## 2019-02-11 PROCEDURE — 96372 THER/PROPH/DIAG INJ SC/IM: CPT | Mod: ,,, | Performed by: INTERNAL MEDICINE

## 2019-02-11 PROCEDURE — 99214 PR OFFICE/OUTPT VISIT, EST, LEVL IV, 30-39 MIN: ICD-10-PCS | Mod: 25,,, | Performed by: INTERNAL MEDICINE

## 2019-02-11 PROCEDURE — 96372 PR INJECTION,THERAP/PROPH/DIAG2ST, IM OR SUBCUT: ICD-10-PCS | Mod: ,,, | Performed by: INTERNAL MEDICINE

## 2019-02-11 RX ORDER — METHYLPREDNISOLONE ACETATE 80 MG/ML
80 INJECTION, SUSPENSION INTRA-ARTICULAR; INTRALESIONAL; INTRAMUSCULAR; SOFT TISSUE ONCE
Status: COMPLETED | OUTPATIENT
Start: 2019-02-11 | End: 2019-02-11

## 2019-02-11 RX ADMIN — METHYLPREDNISOLONE ACETATE 80 MG: 80 INJECTION, SUSPENSION INTRA-ARTICULAR; INTRALESIONAL; INTRAMUSCULAR; SOFT TISSUE at 01:02

## 2019-02-11 NOTE — PROGRESS NOTES
"  SUBJECTIVE:    Patient ID: Fransisca Frazier is a 73 y.o. female.    Chief Complaint: Edema and Follow-up    HPI     Patient comes in for recheck-Her glucoses are doing much better at home but her A1C is 5.9.    She complains of abdominal pain, RUQ , with hx of cholecystectomy-she does have some burning in the epigastrium.Pt feels the pain has been mostly on the right but some to the left of midline-Has lost 12 pounds with these sx.This pain is not associated with nausea-some gas-solid stools-and they do not float on the surface of the water.    Back pain now going down left leg all the way to the foot for the first time. She has been taking Advil for the last few days-    Edema is much improved-      Last 3 sets of Vitals    Vitals - 1 value per visit 12/4/2018 12/6/2018 2/11/2019   SYSTOLIC 134 154 130   DIASTOLIC 70 79 72   PULSE 72 88 76   TEMPERATURE 98.2 - 98.2   RESPIRATIONS 14 - 14   SPO2 97 - 98   Weight (lb) 260 260 248   Weight (kg) 117.935 117.935 112.492   HEIGHT 5' 3" 5' 3" 5' 3"   BODY MASS INDEX 46.06 46.06 43.93   VISIT REPORT - - -   Pain Score  0 0 5       Office Visit on 12/06/2018   Component Date Value Ref Range Status    Color, UA 12/06/2018 clr   Final    Spec Grav UA 12/06/2018 1.005   Final    pH, UA 12/06/2018 5   Final    WBC, UA 12/06/2018 neg   Final    Nitrite, UA 12/06/2018 neg   Final    Protein 12/06/2018 neg   Final    Glucose, UA 12/06/2018 neg   Final    Ketones, UA 12/06/2018 neg   Final    Urobilinogen, UA 12/06/2018 neg   Final    Bilirubin 12/06/2018 neg   Final    Blood, UA 12/06/2018 neg   Final   Office Visit on 12/04/2018   Component Date Value Ref Range Status    Hemoglobin A1C 12/04/2018 5.9   Final   Office Visit on 09/06/2018   Component Date Value Ref Range Status    Color, UA 09/06/2018 y/clr   Final    Spec Grav UA 09/06/2018 1.010   Final    pH, UA 09/06/2018 5   Final    WBC, UA 09/06/2018 neg   Final    Nitrite, UA 09/06/2018 neg   Final    " Protein 09/06/2018 neg   Final    Glucose, UA 09/06/2018 neg   Final    Ketones, UA 09/06/2018 neg]   Final    Urobilinogen, UA 09/06/2018 neg   Final    Bilirubin 09/06/2018 neg   Final    Blood, UA 09/06/2018 neg   Final   Clinical Support on 08/28/2018   Component Date Value Ref Range Status    Color, UA 08/28/2018 y clr   Final    Spec Grav UA 08/28/2018 1.010   Final    pH, UA 08/28/2018 5   Final    WBC, UA 08/28/2018 +   Final    Nitrite, UA 08/28/2018 pos   Final    Protein 08/28/2018 trace   Final    Glucose, UA 08/28/2018 neg   Final    Ketones, UA 08/28/2018 neg   Final    Urobilinogen, UA 08/28/2018 neg   Final    Bilirubin 08/28/2018 neg   Final    Blood, UA 08/28/2018 trace   Final    Urine Culture, Routine 08/28/2018    Final                    Value:KLEBSIELLA PNEUMONIAE  >100,000 cfu/ml     Procedure visit on 08/24/2018   Component Date Value Ref Range Status    Color, UA 08/24/2018 cloudy   Final    Spec Grav UA 08/24/2018 1.010   Final    pH, UA 08/24/2018 5   Final    WBC, UA 08/24/2018 n   Final    Nitrite, UA 08/24/2018 n   Final    Protein 08/24/2018 trace   Final    Glucose, UA 08/24/2018 n   Final    Ketones, UA 08/24/2018 n   Final    Urobilinogen, UA 08/24/2018 n   Final    Bilirubin 08/24/2018 n   Final    Blood, UA 08/24/2018 trace   Final       Past Medical History:   Diagnosis Date    Back pain     Cystitis, interstitial     Hypertension     Uncontrolled type 2 diabetes mellitus without complication, without long-term current use of insulin 1/22/2018    Wears glasses     Wears partial dentures     upper     Past Surgical History:   Procedure Laterality Date    CHOLECYSTECTOMY  02/21/2018    CHOLECYSTECTOMY-LAPAROSCOPIC N/A 2/21/2018    Per[facility] at [facility]    CYSTOSCOPY      D & C      DILATION AND CURETTAGE OF UTERUS      EXTRACTION-CATARACT-IOL Right 8/18/2014    [facility]    EYE SURGERY       bilat cataract     Family History   Problem Relation Age of Onset    Cancer Father     Cancer Sister     Cancer Brother     Cancer Brother        Marital Status:   Alcohol History:  reports that she drinks alcohol.  Tobacco History:  reports that she quit smoking about 25 years ago. Her smoking use included cigarettes. She has a 32.00 pack-year smoking history. she has never used smokeless tobacco.  Drug History:  reports that she does not use drugs.  Sexual History:   Social History     Substance and Sexual Activity   Sexual Activity Not on file       Review of patient's allergies indicates:  No Known Allergies  Current Outpatient Medications   Medication Sig Dispense Refill    alendronate (FOSAMAX) 70 MG tablet Take 1 tablet (70 mg total) by mouth every 7 days. 12 tablet 1    amitriptyline (ELAVIL) 25 MG tablet Take 1 tablet (25 mg total) by mouth 3 (three) times daily. 270 tablet 1    azelaic acid (AZELEX) 15 % gel Use daily on face 150 g 1    ergocalciferol (ERGOCALCIFEROL) 50,000 unit Cap Take 1 capsule by mouth once a week.      estradiol (ESTRING) 2 mg (7.5 mcg /24 hour) vaginal ring Place 2 mg vaginally every 3 (three) months. 1 each 3    ezetimibe (ZETIA) 10 mg tablet Take 1 tablet (10 mg total) by mouth every evening. 90 tablet 1    glucosamine-chondroitin 500-400 mg tablet Take 1 tablet by mouth once daily.      hydroCHLOROthiazide (HYDRODIURIL) 25 MG tablet Take 1 tablet (25 mg total) by mouth 2 (two) times daily. 180 tablet 1    hyoscyamine (OSCIMIN SL) 0.125 mg Subl DISSOLVE 1 TABLET UNDER THE TONGUE THREE TIMES A DAY AS NEEDED 270 tablet 1    pantoprazole (PROTONIX) 40 MG tablet Take 1 tablet (40 mg total) by mouth once daily. 90 tablet 1    pentosan polysulfate (ELMIRON) 100 mg Cap Take 1 capsule (100 mg total) by mouth 3 (three) times daily. 270 capsule 1    sucralfate (CARAFATE) 1 gram tablet Take 1 tablet (1 g total) by mouth 4 (four) times daily. 120 tablet 2     valsartan (DIOVAN) 160 MG tablet Take 1 tablet (160 mg total) by mouth once daily. 90 tablet 1     No current facility-administered medications for this visit.        Review of Systems   Constitutional: Negative for appetite change, chills, diaphoresis, fatigue, fever and unexpected weight change (HPI).   HENT: Negative for congestion, ear pain, hearing loss, nosebleeds, postnasal drip, sinus pressure, sinus pain, sneezing, sore throat, tinnitus, trouble swallowing and voice change.    Eyes: Negative for photophobia, pain, itching and visual disturbance.   Respiratory: Negative for apnea, cough, chest tightness, shortness of breath, wheezing and stridor.    Cardiovascular: Negative for chest pain, palpitations and leg swelling.   Gastrointestinal: Negative for abdominal distention, abdominal pain (HPI), blood in stool, constipation, diarrhea, nausea and vomiting.   Endocrine: Negative for cold intolerance, heat intolerance, polydipsia and polyuria.   Genitourinary: Negative for difficulty urinating, dyspareunia, dysuria, flank pain, frequency, hematuria, menstrual problem, pelvic pain, urgency, vaginal discharge and vaginal pain.   Musculoskeletal: Negative for arthralgias, back pain (HPI), joint swelling, myalgias, neck pain and neck stiffness.   Skin: Negative for pallor.   Allergic/Immunologic: Negative for environmental allergies and food allergies.   Neurological: Negative for dizziness, tremors, speech difficulty, weakness, light-headedness and numbness.   Hematological: Does not bruise/bleed easily.   Psychiatric/Behavioral: Negative for agitation, confusion, decreased concentration, sleep disturbance and suicidal ideas. The patient is not nervous/anxious.           Objective:        Physical Exam   Constitutional: She is oriented to person, place, and time. She appears well-developed. She is cooperative. No distress.   Increased BMI   HENT:   Head: Normocephalic and atraumatic.   Right Ear: Tympanic membrane  normal.   Left Ear: Tympanic membrane normal.   Mouth/Throat: Uvula is midline and mucous membranes are normal.   Eyes: Conjunctivae and EOM are normal. Pupils are equal, round, and reactive to light. Right eye exhibits no discharge. Left eye exhibits no discharge. No scleral icterus. Right pupil is round and reactive. Left pupil is round and reactive.   Neck: Trachea normal and normal range of motion. Neck supple. No JVD present. Carotid bruit is not present. No thyromegaly present.   Cardiovascular: Normal rate, regular rhythm and intact distal pulses. Exam reveals no gallop and no friction rub.   No murmur heard.  Pulmonary/Chest: Effort normal and breath sounds normal. No respiratory distress. She has no wheezes. She has no rales.   Abdominal: Soft. Bowel sounds are normal. She exhibits no distension and no mass. There is no tenderness. There is no guarding. No hernia.   No tenderness-normal BS   Musculoskeletal: Normal range of motion. She exhibits edema (1+ edema bilaterally slightly greater left foot).   Neurological: She is alert and oriented to person, place, and time. She has normal strength.   Skin: Skin is warm and dry. Capillary refill takes less than 2 seconds. No lesion and no rash noted. No cyanosis. Nails show no clubbing.   Psychiatric: She has a normal mood and affect. Her speech is normal and behavior is normal. Judgment and thought content normal.   Nursing note and vitals reviewed.        Protective Sensation (w/ 10 gram monofilament):  Right: Intact  Left: Intact    Visual Inspection:  Normal -  Bilateral    Pedal Pulses:   Right: Present  Left: Present    Posterior tibialis:   Right:Present  Left: Present    Assessment:       1. Pain of upper abdomen    2. Vitamin D deficiency    3. Spinal stenosis of lumbar region without neurogenic claudication    4. IFG (impaired fasting glucose)    5. Breast cancer screening    6. RUQ pain         Plan:       Pain of upper abdomen  -     CBC auto  differential; Future; Expected date: 06/11/2019  -     Amylase; Future; Expected date: 02/11/2019  -     Lipase; Future; Expected date: 02/11/2019  -     CT Abdomen With Without Contrast; Future; Expected date: 02/11/2019  -     Creatinine, serum; Future; Expected date: 02/11/2019    Vitamin D deficiency  -     Vitamin D; Future; Expected date: 02/12/2019    Spinal stenosis of lumbar region without neurogenic claudication  -     methylPREDNISolone sod suc(PF) injection 125 mg  -     methylPREDNISolone acetate injection 80 mg  -     WALKER FOR HOME USE    IFG (impaired fasting glucose)  -     Microalbumin/creatinine urine ratio; Future; Expected date: 06/11/2019  -     Hemoglobin A1c; Future; Expected date: 06/11/2019    Breast cancer screening  -     Mammo Digital Screening Bilat without CA    RUQ pain  -     CT Abdomen With Without Contrast; Future; Expected date: 02/11/2019    Other orders  -     Cancel: Comprehensive metabolic panel; Future; Expected date: 06/11/2019  -     Cancel: Lipid panel; Future; Expected date: 06/11/2019      Follow-up in about 3 months (around 5/11/2019).        2/11/2019 Elda Paredes M.D.

## 2019-02-13 ENCOUNTER — PATIENT MESSAGE (OUTPATIENT)
Dept: FAMILY MEDICINE | Facility: CLINIC | Age: 74
End: 2019-02-13

## 2019-02-13 ENCOUNTER — TELEPHONE (OUTPATIENT)
Dept: FAMILY MEDICINE | Facility: CLINIC | Age: 74
End: 2019-02-13

## 2019-02-13 NOTE — TELEPHONE ENCOUNTER
----- Message from Elda Paredes MD sent at 2/12/2019 10:06 PM CST -----  Test results are normal-only needs about 600-1000 iu vit D daily

## 2019-03-06 ENCOUNTER — OFFICE VISIT (OUTPATIENT)
Dept: UROGYNECOLOGY | Facility: CLINIC | Age: 74
End: 2019-03-06
Payer: MEDICARE

## 2019-03-06 VITALS
HEART RATE: 72 BPM | DIASTOLIC BLOOD PRESSURE: 75 MMHG | HEIGHT: 63 IN | SYSTOLIC BLOOD PRESSURE: 150 MMHG | BODY MASS INDEX: 44.18 KG/M2 | WEIGHT: 249.31 LBS

## 2019-03-06 DIAGNOSIS — N81.11 CYSTOCELE, MIDLINE: ICD-10-CM

## 2019-03-06 DIAGNOSIS — R35.0 URINARY FREQUENCY: Primary | ICD-10-CM

## 2019-03-06 DIAGNOSIS — N95.2 ATROPHIC VAGINITIS: ICD-10-CM

## 2019-03-06 DIAGNOSIS — M54.42 ACUTE MIDLINE LOW BACK PAIN WITH LEFT-SIDED SCIATICA: ICD-10-CM

## 2019-03-06 DIAGNOSIS — Z87.440 HISTORY OF RECURRENT UTI (URINARY TRACT INFECTION): ICD-10-CM

## 2019-03-06 DIAGNOSIS — Z46.89 PESSARY MAINTENANCE: ICD-10-CM

## 2019-03-06 DIAGNOSIS — N34.2 URETHRITIS: ICD-10-CM

## 2019-03-06 LAB
BILIRUB SERPL-MCNC: NORMAL MG/DL
BLOOD URINE, POC: NORMAL
COLOR, POC UA: NORMAL
GLUCOSE UR QL STRIP: NORMAL
KETONES UR QL STRIP: NORMAL
LEUKOCYTE ESTERASE URINE, POC: NORMAL
NITRITE, POC UA: NORMAL
PH, POC UA: 7
PROTEIN, POC: NORMAL
SPECIFIC GRAVITY, POC UA: 1.01
UROBILINOGEN, POC UA: NORMAL

## 2019-03-06 PROCEDURE — 99213 OFFICE O/P EST LOW 20 MIN: CPT | Mod: S$PBB,,, | Performed by: NURSE PRACTITIONER

## 2019-03-06 PROCEDURE — 99213 PR OFFICE/OUTPT VISIT, EST, LEVL III, 20-29 MIN: ICD-10-PCS | Mod: S$PBB,,, | Performed by: NURSE PRACTITIONER

## 2019-03-06 PROCEDURE — 81002 URINALYSIS NONAUTO W/O SCOPE: CPT | Mod: PBBFAC,PO | Performed by: NURSE PRACTITIONER

## 2019-03-06 PROCEDURE — 99214 OFFICE O/P EST MOD 30 MIN: CPT | Mod: PBBFAC,PO | Performed by: NURSE PRACTITIONER

## 2019-03-06 PROCEDURE — 99999 PR PBB SHADOW E&M-EST. PATIENT-LVL IV: ICD-10-PCS | Mod: PBBFAC,,, | Performed by: NURSE PRACTITIONER

## 2019-03-06 PROCEDURE — 99999 PR PBB SHADOW E&M-EST. PATIENT-LVL IV: CPT | Mod: PBBFAC,,, | Performed by: NURSE PRACTITIONER

## 2019-03-06 NOTE — PROGRESS NOTES
Subjective:       Patient ID: Fransisca Frazier is a 73 y.o. female.    Chief Complaint: 3 month e string    HPI  Fransisca Frazier is a 73 y.o. female who presents today for routine estring change.  She has been doing well with the estring and denies any vaginal complaints.  She has been doing well from a urinary standpoint as well and denies any urinary complaints or recent UTI's.  She does have some cipro on hand in case she has problems and is hoping to have a new rx in May before she leaves on vacation.  Her biggest complaints is her left leg sciatica.  She has been having problems with this for the past month.  She has seen her PCP and had 2 injections, but she is not sure what else to do.    Review of Systems   Constitutional: Negative for activity change, fever and unexpected weight change.   HENT: Negative for hearing loss.    Eyes: Negative for visual disturbance.   Respiratory: Negative for shortness of breath and wheezing.    Cardiovascular: Negative for chest pain, palpitations and leg swelling.   Gastrointestinal: Negative for abdominal pain, constipation and diarrhea.   Genitourinary: Positive for frequency. Negative for dyspareunia, dysuria, urgency, vaginal bleeding and vaginal discharge.   Musculoskeletal: Negative for gait problem and neck pain.   Skin: Negative for rash and wound.   Allergic/Immunologic: Negative for immunocompromised state.   Neurological: Negative for tremors, speech difficulty and weakness.   Hematological: Does not bruise/bleed easily.   Psychiatric/Behavioral: Negative for agitation and confusion.       Objective:      Physical Exam   Constitutional: She is oriented to person, place, and time. She appears well-developed and well-nourished. No distress.   HENT:   Head: Normocephalic and atraumatic.   Neck: Neck supple. No thyromegaly present.   Pulmonary/Chest: Effort normal. No respiratory distress.   Abdominal: Soft. There is no tenderness. No hernia.   Musculoskeletal: Normal  range of motion.   Neurological: She is alert and oriented to person, place, and time.   Skin: Skin is warm and dry. No rash noted.   Psychiatric: She has a normal mood and affect. Her behavior is normal.     Pelvic Exam:  V: No lesions. No palpable nodes.   Va: no discharge or bleeding.  Good length.  Dominant midline cystocele Ba=-1  Meatus:No caruncle or stenosis  Urethra: Non tender. No suburethral masses.  Cx/Cuff: Normal   Uterus: small, non-tender  Ad: No mass or tenderness.  Levators :Symmetrical. Normal tone. Non tender.  BL: Non tender  RV: No hemorrhoids.      Assessment:       1. Urinary frequency    2. Cystocele, midline    3. Pessary maintenance    4. Atrophic vaginitis    5. History of recurrent UTI (urinary tract infection)    6. Urethritis    7. Acute midline low back pain with left-sided sciatica          Procedure note- estring  pessary removed and discarded.  After betadine irrigation of the vagina,  A new estring, which the pt provided, was reinserted into the vagina.  Pt ambulated in the room and denies any discomfort.  NP reminded pt that the first 24-48 hours are the most likely time for the pessary to fall out and to monitor the toilet before flushing.  Pt verbalized understanding.      Plan:       Urinary frequency- monitor  -     POCT URINE DIPSTICK WITHOUT MICROSCOPE    Cystocele, midline- continue with estring    Pessary maintenance- as noted above    Atrophic vaginitis- as noted above    History of recurrent UTI (urinary tract infection)- doing well recently, but Np will send in RX in may if she needs it for her vacation.    Urethritis- resolved, monitor    Acute midline low back pain with left-sided sciatica- encouraged to follow up with PCP.  Also pt is aware of the Back and spine department at [facility] and can make an appt with them as well    RTC 3 months for  estring.

## 2019-03-17 NOTE — PLAN OF CARE
Dr Webber paper H & P and interval note    Surgical and anesthesia consents signed    Pt  is at the bedside     I have personally performed a face to face diagnostic evaluation on this patient. I have reviewed the ACP note and agree with the history, exam and plan of care, except as noted.

## 2019-03-18 ENCOUNTER — PATIENT MESSAGE (OUTPATIENT)
Dept: FAMILY MEDICINE | Facility: CLINIC | Age: 74
End: 2019-03-18

## 2019-03-18 DIAGNOSIS — M48.061 SPINAL STENOSIS OF LUMBAR REGION WITHOUT NEUROGENIC CLAUDICATION: Primary | ICD-10-CM

## 2019-03-19 ENCOUNTER — INITIAL CONSULT (OUTPATIENT)
Dept: SPINE | Facility: CLINIC | Age: 74
End: 2019-03-19
Payer: MEDICARE

## 2019-03-19 VITALS — HEIGHT: 63 IN | WEIGHT: 249.31 LBS | BODY MASS INDEX: 44.18 KG/M2

## 2019-03-19 DIAGNOSIS — M54.16 LUMBAR RADICULITIS: Primary | ICD-10-CM

## 2019-03-19 PROCEDURE — 99214 OFFICE O/P EST MOD 30 MIN: CPT | Mod: ,,, | Performed by: PHYSICAL MEDICINE & REHABILITATION

## 2019-03-19 PROCEDURE — 99214 PR OFFICE/OUTPT VISIT, EST, LEVL IV, 30-39 MIN: ICD-10-PCS | Mod: ,,, | Performed by: PHYSICAL MEDICINE & REHABILITATION

## 2019-03-19 RX ORDER — HYDROCODONE BITARTRATE AND ACETAMINOPHEN 5; 325 MG/1; MG/1
1 TABLET ORAL EVERY 6 HOURS PRN
Qty: 30 TABLET | Refills: 0 | Status: SHIPPED | OUTPATIENT
Start: 2019-03-19 | End: 2019-05-13

## 2019-03-19 RX ORDER — TIZANIDINE 4 MG/1
4 TABLET ORAL 2 TIMES DAILY PRN
Qty: 20 TABLET | Refills: 0 | Status: SHIPPED | OUTPATIENT
Start: 2019-03-19 | End: 2019-03-29

## 2019-03-19 NOTE — PROGRESS NOTES
SUBJECTIVE:    Patient ID: Fransisca Frazier is a 73 y.o. female.    Chief Complaint: Back Pain (lower L side radiating down to L foot )    This is a 73-year-old woman who sees Dr. Paredes for her primary care. She has a history of hypertension hyperlipidemia and stage 3 chronic kidney disease.  No significant past medical history of back problems.  She says that on or around February 8th of this year she reached over to flush her commode and had the acute onset of left-sided low back pain associated with left leg pain.  She received a couple of steroid injections through Dr. Paredes office but her pain has persisted.  She complains of pain on the left side at the lumbosacral junction radiating down the left leg primarily to the medial and posterior portion of the left leg.  Occasionally she gets some numbness.  The leg pain overall is intermittent in nature.  She denies any lauren weakness bowel or bladder dysfunction fever chills sweats or unexpected weight loss.  Current pain level is a 2/10 but pain gets severe when she attempts to walk.          Past Medical History:   Diagnosis Date    Back pain     Cystitis, interstitial     Hypertension     Uncontrolled type 2 diabetes mellitus without complication, without long-term current use of insulin 1/22/2018    Wears glasses     Wears partial dentures     upper     Social History     Socioeconomic History    Marital status:      Spouse name: Not on file    Number of children: Not on file    Years of education: Not on file    Highest education level: Not on file   Social Needs    Financial resource strain: Not on file    Food insecurity - worry: Not on file    Food insecurity - inability: Not on file    Transportation needs - medical: Not on file    Transportation needs - non-medical: Not on file   Occupational History    Not on file   Tobacco Use    Smoking status: Former Smoker     Packs/day: 1.00     Years: 32.00     Pack years: 32.00      "Types: Cigarettes     Last attempt to quit: 1993     Years since quittin.6    Smokeless tobacco: Never Used   Substance and Sexual Activity    Alcohol use: Yes     Comment: occasional  2018    Drug use: No    Sexual activity: Not on file   Other Topics Concern    Not on file   Social History Narrative    Not on file     Past Surgical History:   Procedure Laterality Date    CHOLECYSTECTOMY  2018    CHOLECYSTECTOMY-LAPAROSCOPIC N/A 2018    [facility]formed [facility] at [facility] OR    CYSTOSCOPY      D & C      DILATION AND CURETTAGE OF UTERUS      EXTRACTION-CATARACT-IOL Right 2014    [facility] OR    EYE SURGERY      bilat cataract     Family History   Problem Relation Age of Onset    Cancer Father     Cancer Sister     Cancer Brother     Cancer Brother      Vitals:    19 1339   Weight: 113.1 kg (249 lb 5.4 oz)   Height: 5' 3" (1.6 m)       Review of Systems   Constitutional: Negative for chills, diaphoresis, fatigue, fever and unexpected weight change.   HENT: Negative for trouble swallowing.    Eyes: Negative for visual disturbance.   Respiratory: Negative for shortness of breath.    Cardiovascular: Negative for chest pain.   Gastrointestinal: Negative for abdominal pain, constipation, nausea and vomiting.   Genitourinary: Negative for difficulty urinating.   Musculoskeletal: Negative for arthralgias, back pain, gait problem, joint swelling, myalgias, neck pain and neck stiffness.   Neurological: Negative for dizziness, speech difficulty, weakness, light-headedness, numbness and headaches.          Objective:      Physical Exam   Constitutional: She is oriented to person, place, and time. She appears well-developed and well-nourished.   Neurological: She is alert and oriented to person, place, and time.   She is awake and in no acute distress  Mild point tenderness to palpation on the left at the lumbosacral junction.  No palpable " masses  Deep tendon reflexes are +2 at both knees and +1 at both ankles  Strength is normal in both lower extremities  Straight leg raising on the left reproduces left leg pain which she describes as radiating into the left calf  Straight leg raising on the right is negative           Assessment:       1. Lumbar radiculitis           Plan:     she has a nonfocal examination from a neurological standpoint and no historical red flags.  She has symptoms consistent with a left lower lumbar radiculitis I suspect S1.  She is in a substantial amount of pain in I think she would be best served with an MRI followed by epidural steroid injection.  In the meantime a muscle going to write her prescription for Norco and Zanaflex for pain.  I will attempt to facilitate the epidural injections as well.  I will see her back after the MRI      Lumbar radiculitis  -     MRI Lumbar Spine Without Contrast; Future; Expected date: 03/19/2019    Other orders  -     HYDROcodone-acetaminophen (NORCO) 5-325 mg per tablet; Take 1 tablet by mouth every 6 (six) hours as needed for Pain.  Dispense: 30 tablet; Refill: 0  -     tiZANidine (ZANAFLEX) 4 MG tablet; Take 1 tablet (4 mg total) by mouth 2 (two) times daily as needed.  Dispense: 20 tablet; Refill: 0

## 2019-03-22 ENCOUNTER — OFFICE VISIT (OUTPATIENT)
Dept: SPINE | Facility: CLINIC | Age: 74
End: 2019-03-22
Payer: MEDICARE

## 2019-03-22 ENCOUNTER — HOSPITAL ENCOUNTER (OUTPATIENT)
Dept: RADIOLOGY | Facility: HOSPITAL | Age: 74
Discharge: HOME | End: 2019-03-22
Attending: PHYSICAL MEDICINE & REHABILITATION
Payer: MEDICARE

## 2019-03-22 VITALS — BODY MASS INDEX: 44.18 KG/M2 | WEIGHT: 249.31 LBS | HEIGHT: 63 IN

## 2019-03-22 DIAGNOSIS — M54.16 LUMBAR RADICULITIS: ICD-10-CM

## 2019-03-22 DIAGNOSIS — M54.16 LUMBAR RADICULITIS: Primary | ICD-10-CM

## 2019-03-22 PROCEDURE — 99213 OFFICE O/P EST LOW 20 MIN: CPT | Mod: ,,, | Performed by: PHYSICAL MEDICINE & REHABILITATION

## 2019-03-22 PROCEDURE — 99213 PR OFFICE/OUTPT VISIT, EST, LEVL III, 20-29 MIN: ICD-10-PCS | Mod: ,,, | Performed by: PHYSICAL MEDICINE & REHABILITATION

## 2019-03-22 PROCEDURE — 72148 MRI LUMBAR SPINE W/O DYE: CPT | Mod: 26,,, | Performed by: RADIOLOGY

## 2019-03-22 PROCEDURE — 72148 MRI LUMBAR SPINE WITHOUT CONTRAST: ICD-10-PCS | Mod: 26,,, | Performed by: RADIOLOGY

## 2019-03-22 PROCEDURE — 72148 MRI LUMBAR SPINE W/O DYE: CPT | Mod: TC

## 2019-03-22 NOTE — PROGRESS NOTES
SUBJECTIVE:    Patient ID: Fransisca Frazier is a 73 y.o. female.    Chief Complaint: Results (MRI)    She is here to review her lumbar MRI which was done to evaluate her primary complaint of left-sided low back pain associated with left leg radicular symptoms.  The the MRI summarized below:      1. There is no fracture.  There is degenerative change consisting of facet joint arthropathy with mild bulging of the annulus at several levels.  These findings are present in the setting of a spinal canal which is small on a developmental basis.  The pertinent findings will be summarized below.  2. There are, however, findings at the L5-S1 level consisting of a left paracentral disc extrusion with slight caudad extension in addition to facet joint arthropathy contributing to severe left lateral recess stenosis with compression and posterior displacement of the left S1 root.  There is mild spinal stenosis without significant foraminal stenosis.  3. There is mild degenerative change but there is no spinal canal or significant foraminal stenosis at the T10-11 through L2-3 levels.  4. At the L3-4 level there is multifactorial moderate spinal stenosis with mild bilateral foraminal stenosis.  5. At the L4-5 level there is multifactorial moderate spinal stenosis with mild left but mild-to-moderate right foraminal stenosis.    Clinically she is about the same, perhaps a little bit better but still bothered with significant left leg pain particularly with standing.  She does all right as long she is not moving around.  She has no new or progressive problems          Past Medical History:   Diagnosis Date    Back pain     Cystitis, interstitial     Hypertension     Uncontrolled type 2 diabetes mellitus without complication, without long-term current use of insulin 1/22/2018    Wears glasses     Wears partial dentures     upper     Social History     Socioeconomic History    Marital status:      Spouse name: Not on file  "   Number of children: Not on file    Years of education: Not on file    Highest education level: Not on file   Social Needs    Financial resource strain: Not on file    Food insecurity - worry: Not on file    Food insecurity - inability: Not on file    Transportation needs - medical: Not on file    Transportation needs - non-medical: Not on file   Occupational History    Not on file   Tobacco Use    Smoking status: Former Smoker     Packs/day: 1.00     Years: 32.00     Pack years: 32.00     Types: Cigarettes     Last attempt to quit: 1993     Years since quittin.6    Smokeless tobacco: Never Used   Substance and Sexual Activity    Alcohol use: Yes     Comment: occasional  2018    Drug use: No    Sexual activity: Not on file   Other Topics Concern    Not on file   Social History Narrative    Not on file     Past Surgical History:   Procedure Laterality Date    CHOLECYSTECTOMY  2018    CHOLECYSTECTOMY-LAPAROSCOPIC N/A 2018    [facility]    CYSTOSCOPY      D & C      DILATION AND CURETTAGE OF UTERUS      EXTRACTION-CATARACT-IOL Right 2014    [facility] at [facility] OR    EYE SURGERY      bilat cataract     Family History   Problem Relation Age of Onset    Cancer Father     Cancer Sister     Cancer Brother     Cancer Brother      Vitals:    19 1358   Weight: 113.1 kg (249 lb 5.4 oz)   Height: 5' 3" (1.6 m)       Review of Systems   Constitutional: Negative for chills, diaphoresis, fatigue, fever and unexpected weight change.   HENT: Negative for trouble swallowing.    Eyes: Negative for visual disturbance.   Respiratory: Negative for shortness of breath.    Cardiovascular: Negative for chest pain.   Gastrointestinal: Negative for abdominal pain, constipation, nausea and vomiting.   Genitourinary: Negative for difficulty urinating.   Musculoskeletal: Negative for arthralgias, back pain, gait problem, joint " swelling, myalgias, neck pain and neck stiffness.   Neurological: Negative for dizziness, speech difficulty, weakness, light-headedness, numbness and headaches.          Objective:      Physical Exam   Constitutional: She appears well-developed and well-nourished.   Not examined           Assessment:       1. Lumbar radiculitis           Plan:     I spoke to Dr. Ocasio who intends to review the patient's information through the electronic medical record and arrange for epidural steroid injections.  She can follow up with me after the procedure      Lumbar radiculitis

## 2019-05-13 ENCOUNTER — OFFICE VISIT (OUTPATIENT)
Dept: FAMILY MEDICINE | Facility: CLINIC | Age: 74
End: 2019-05-13
Payer: MEDICARE

## 2019-05-13 VITALS
TEMPERATURE: 98 F | DIASTOLIC BLOOD PRESSURE: 56 MMHG | HEART RATE: 80 BPM | OXYGEN SATURATION: 97 % | HEIGHT: 63 IN | WEIGHT: 254 LBS | RESPIRATION RATE: 14 BRPM | BODY MASS INDEX: 45 KG/M2 | SYSTOLIC BLOOD PRESSURE: 124 MMHG

## 2019-05-13 DIAGNOSIS — K21.9 GERD WITHOUT ESOPHAGITIS: ICD-10-CM

## 2019-05-13 DIAGNOSIS — M54.16 LUMBAR RADICULOPATHY: ICD-10-CM

## 2019-05-13 DIAGNOSIS — E78.00 PURE HYPERCHOLESTEROLEMIA: ICD-10-CM

## 2019-05-13 DIAGNOSIS — E11.9 CONTROLLED TYPE 2 DIABETES MELLITUS WITHOUT COMPLICATION, WITHOUT LONG-TERM CURRENT USE OF INSULIN: ICD-10-CM

## 2019-05-13 DIAGNOSIS — R30.0 DYSURIA: ICD-10-CM

## 2019-05-13 DIAGNOSIS — N30.10 INTERSTITIAL CYSTITIS: Chronic | ICD-10-CM

## 2019-05-13 DIAGNOSIS — Z91.09 ENVIRONMENTAL ALLERGIES: ICD-10-CM

## 2019-05-13 DIAGNOSIS — N18.30 CHRONIC RENAL DISEASE, STAGE III: ICD-10-CM

## 2019-05-13 DIAGNOSIS — I10 HYPERTENSION, UNSPECIFIED TYPE: Primary | ICD-10-CM

## 2019-05-13 DIAGNOSIS — R10.10 PAIN OF UPPER ABDOMEN: ICD-10-CM

## 2019-05-13 PROCEDURE — 99214 OFFICE O/P EST MOD 30 MIN: CPT | Mod: ,,, | Performed by: INTERNAL MEDICINE

## 2019-05-13 PROCEDURE — 99214 PR OFFICE/OUTPT VISIT, EST, LEVL IV, 30-39 MIN: ICD-10-PCS | Mod: ,,, | Performed by: INTERNAL MEDICINE

## 2019-05-13 RX ORDER — GABAPENTIN 300 MG/1
CAPSULE ORAL
Refills: 2 | COMMUNITY
Start: 2019-04-09 | End: 2019-10-17

## 2019-05-13 RX ORDER — HYDROCHLOROTHIAZIDE 25 MG/1
25 TABLET ORAL 2 TIMES DAILY
Qty: 180 TABLET | Refills: 1 | Status: SHIPPED | OUTPATIENT
Start: 2019-05-13 | End: 2020-01-29 | Stop reason: SDUPTHER

## 2019-05-13 RX ORDER — VALSARTAN 160 MG/1
160 TABLET ORAL DAILY
Qty: 90 TABLET | Refills: 1 | Status: SHIPPED | OUTPATIENT
Start: 2019-05-13 | End: 2020-01-29 | Stop reason: SDUPTHER

## 2019-05-13 RX ORDER — EZETIMIBE 10 MG/1
10 TABLET ORAL NIGHTLY
Qty: 90 TABLET | Refills: 1 | Status: SHIPPED | OUTPATIENT
Start: 2019-05-13 | End: 2020-01-29 | Stop reason: SDUPTHER

## 2019-05-13 RX ORDER — CIPROFLOXACIN 500 MG/1
500 TABLET ORAL 2 TIMES DAILY
Qty: 20 TABLET | Refills: 0 | Status: SHIPPED | OUTPATIENT
Start: 2019-05-13 | End: 2019-06-06 | Stop reason: ALTCHOICE

## 2019-05-13 RX ORDER — PANTOPRAZOLE SODIUM 40 MG/1
40 TABLET, DELAYED RELEASE ORAL DAILY
Qty: 90 TABLET | Refills: 1 | Status: SHIPPED | OUTPATIENT
Start: 2019-05-13 | End: 2020-01-29 | Stop reason: SDUPTHER

## 2019-05-13 RX ORDER — HYOSCYAMINE SULFATE 0.12 MG/1
TABLET SUBLINGUAL
Qty: 270 TABLET | Refills: 1 | Status: SHIPPED | OUTPATIENT
Start: 2019-05-13 | End: 2020-01-29 | Stop reason: SDUPTHER

## 2019-05-13 RX ORDER — LORATADINE 10 MG/1
10 TABLET ORAL DAILY
COMMUNITY
End: 2019-10-17

## 2019-05-13 RX ORDER — AMITRIPTYLINE HYDROCHLORIDE 25 MG/1
25 TABLET, FILM COATED ORAL 3 TIMES DAILY
Qty: 270 TABLET | Refills: 1 | Status: SHIPPED | OUTPATIENT
Start: 2019-05-13 | End: 2020-01-29 | Stop reason: SDUPTHER

## 2019-05-13 RX ORDER — CYCLOBENZAPRINE HCL 10 MG
TABLET ORAL
Refills: 0 | COMMUNITY
Start: 2019-04-09 | End: 2019-05-13

## 2019-05-13 RX ORDER — CIPROFLOXACIN 500 MG/1
250 TABLET ORAL 2 TIMES DAILY
Qty: 6 TABLET | Refills: 0 | Status: SHIPPED | OUTPATIENT
Start: 2019-05-13 | End: 2019-05-13

## 2019-05-13 RX ORDER — POTASSIUM CHLORIDE 1500 MG/1
10 TABLET, EXTENDED RELEASE ORAL DAILY
COMMUNITY
Start: 2019-04-16 | End: 2019-10-17

## 2019-05-13 NOTE — PROGRESS NOTES
Subjective:     Patient ID: Fransisca Frazier is a 73 y.o. female.    Chief Complaint: Edema and Follow-up (3 month)    HPI    She complains of abdominal pain, RUQ , with hx of cholecystectomy-she does have some burning in the epigastrium.Pt feels the pain has been mostly on the right but some to the left of midline-Has lost 12 pounds with these sx.This pain is not associated with nausea-some gas-solid stools-and they do not float on the surface of the water.She forgot to have the CTscan-she has not had a bad episode since the last visit.    Back pain now going down left leg all the way to the foot. She was referred to Dr. Sadiq Amezcua who ordered an MRI of the lumbar spine. It revealed at the level of L2-3 a mild disc bulge with mild to moderate facet joint arthropathy without any evidence of significant foraminal stenosis at L3 for the combination of moderate facet joint arthropathy and prominent dorsal epidural fat created a moderate spinal stenosis and bilateral foraminal stenosis at L4-L5 there was a moderate disc margin moderate facet joint arthropathy with a prominent epidural fat pad resulting in moderate spinal stenosis more so on the right side and L5-S1 there was mild to moderate facet joint arthropathy and left paracentral disc extrusion providing severe left lateral recess stenosis the left-sided disc extrusion resulted in severe left lateral recess stenosis as well as compression and posterior displacement of the left S1 root.    She was referred to Dr. Weston Ocasio for epidural injections--She has now had two epidurals-she may have a third before surgery is considered.(She got some irritation after a certain activity in physical therapy-)      Office Visit on 03/06/2019   Component Date Value Ref Range Status    Color,  03/06/2019 clr   Final    Spec Grav  03/06/2019 1.010   Final    pH,  03/06/2019 7   Final    WBC,  03/06/2019 neg   Final    Nitrite,  03/06/2019 neg   Final     Protein 03/06/2019 neg   Final    Glucose, UA 03/06/2019 neg   Final    Ketones, UA 03/06/2019 neg   Final    Urobilinogen, UA 03/06/2019 neg   Final    Bilirubin 03/06/2019 neg   Final    Blood, UA 03/06/2019 neg   Final   Office Visit on 02/11/2019   Component Date Value Ref Range Status    Vitamin D, 1,25 (OH)2 02/11/2019 46.80  30.00 - 100.00 ng/mL Final    Amylase 02/11/2019 76  28 - 100 U/L Final    Lipase 02/11/2019 28  0 - 38 U/L Final   Office Visit on 12/06/2018   Component Date Value Ref Range Status    Color, UA 12/06/2018 clr   Final    Spec Grav UA 12/06/2018 1.005   Final    pH, UA 12/06/2018 5   Final    WBC, UA 12/06/2018 neg   Final    Nitrite, UA 12/06/2018 neg   Final    Protein 12/06/2018 neg   Final    Glucose, UA 12/06/2018 neg   Final    Ketones, UA 12/06/2018 neg   Final    Urobilinogen, UA 12/06/2018 neg   Final    Bilirubin 12/06/2018 neg   Final    Blood, UA 12/06/2018 neg   Final   Office Visit on 12/04/2018   Component Date Value Ref Range Status    Hemoglobin A1C 12/04/2018 5.9   Final       Past Medical History:   Diagnosis Date    Back pain     Cystitis, interstitial     Hypertension     Uncontrolled type 2 diabetes mellitus without complication, without long-term current use of insulin 1/22/2018    Wears glasses     Wears partial dentures     upper     Past Surgical History:   Procedure Laterality Date    CHOLECYSTECTOMY  02/21/2018    CHOLECYSTECTOMY-LAPAROSCOPIC N/A 2/21/2018    [facility]    CYSTOSCOPY      D & C      DILATION AND CURETTAGE OF UTERUS      EXTRACTION-CATARACT-IOL Right 8/18/2014    [facility]    EYE SURGERY      bilat cataract     Family History   Problem Relation Age of Onset    Cancer Father     Cancer Sister     Cancer Brother     Cancer Brother        Marital Status:   Alcohol History:  reports that she drinks alcohol.  Tobacco History:  reports that  she quit smoking about 25 years ago. Her smoking use included cigarettes. She has a 32.00 pack-year smoking history. She has never used smokeless tobacco.  Drug History:  reports that she does not use drugs.    Review of patient's allergies indicates:  No Known Allergies    Current Outpatient Medications:     alendronate (FOSAMAX) 70 MG tablet, Take 1 tablet (70 mg total) by mouth every 7 days., Disp: 12 tablet, Rfl: 1    amitriptyline (ELAVIL) 25 MG tablet, Take 1 tablet (25 mg total) by mouth 3 (three) times daily., Disp: 270 tablet, Rfl: 1    azelaic acid (AZELEX) 15 % gel, Use daily on face, Disp: 150 g, Rfl: 1    ergocalciferol (ERGOCALCIFEROL) 50,000 unit Cap, Take 1 capsule by mouth once a week., Disp: , Rfl:     estradiol (ESTRING) 2 mg (7.5 mcg /24 hour) vaginal ring, Place 2 mg vaginally every 3 (three) months., Disp: 1 each, Rfl: 3    ezetimibe (ZETIA) 10 mg tablet, Take 1 tablet (10 mg total) by mouth every evening., Disp: 90 tablet, Rfl: 1    gabapentin (NEURONTIN) 300 MG capsule, TK 1 C PO QHS, Disp: , Rfl: 2    glucosamine-chondroitin 500-400 mg tablet, Take 1 tablet by mouth once daily., Disp: , Rfl:     hydroCHLOROthiazide (HYDRODIURIL) 25 MG tablet, Take 1 tablet (25 mg total) by mouth 2 (two) times daily., Disp: 180 tablet, Rfl: 1    hyoscyamine (OSCIMIN SL) 0.125 mg Subl, DISSOLVE 1 TABLET UNDER THE TONGUE THREE TIMES A DAY AS NEEDED, Disp: 270 tablet, Rfl: 1    KLOR-CON M20 20 mEq tablet, Take 10 tablets by mouth once daily. , Disp: , Rfl:     loratadine (CLARITIN) 10 mg tablet, Take 10 mg by mouth once daily., Disp: , Rfl:     pantoprazole (PROTONIX) 40 MG tablet, Take 1 tablet (40 mg total) by mouth once daily., Disp: 90 tablet, Rfl: 1    pentosan polysulfate (ELMIRON) 100 mg Cap, Take 1 capsule (100 mg total) by mouth 3 (three) times daily., Disp: 270 capsule, Rfl: 1    valsartan (DIOVAN) 160 MG tablet, Take 1 tablet (160 mg total) by mouth once daily., Disp: 90 tablet, Rfl:  1    ciprofloxacin HCl (CIPRO) 500 MG tablet, Take 1 tablet (500 mg total) by mouth 2 (two) times daily., Disp: 20 tablet, Rfl: 0    Review of Systems   Constitutional: Positive for activity change (weight gain -decreased activity). Negative for appetite change, chills, diaphoresis, fatigue, fever and unexpected weight change.   HENT: Negative for congestion, ear pain, hearing loss, nosebleeds, postnasal drip, rhinorrhea, sinus pressure, sinus pain, sneezing, sore throat, tinnitus, trouble swallowing and voice change.    Eyes: Negative for photophobia, pain, discharge, itching and visual disturbance.   Respiratory: Negative for apnea, cough, chest tightness, shortness of breath, wheezing and stridor.    Cardiovascular: Negative for chest pain, palpitations and leg swelling (minimal).   Gastrointestinal: Negative for abdominal distention, abdominal pain, blood in stool, constipation (always), diarrhea, nausea and vomiting.   Endocrine: Negative for cold intolerance, heat intolerance, polydipsia and polyuria.   Genitourinary: Negative for difficulty urinating, dyspareunia, dysuria (pain in the urthra when walking), flank pain, frequency, hematuria, menstrual problem, pelvic pain, urgency, vaginal discharge and vaginal pain.   Musculoskeletal: Negative for arthralgias, back pain (HPI), joint swelling, myalgias, neck pain and neck stiffness.   Skin: Negative for pallor.   Allergic/Immunologic: Negative for environmental allergies and food allergies.   Neurological: Negative for dizziness, tremors, speech difficulty, weakness (left lower extremity), light-headedness, numbness (left leg) and headaches.        Poor balance   Hematological: Does not bruise/bleed easily.   Psychiatric/Behavioral: Negative for agitation, confusion, decreased concentration, dysphoric mood, sleep disturbance and suicidal ideas. The patient is not nervous/anxious.           Objective:      Vitals:    05/13/19 1348   BP: (!) 124/56   Pulse: 80  "  Resp: 14   Temp: 98.2 °F (36.8 °C)   SpO2: 97%   Weight: 115.2 kg (254 lb)   Height: 5' 3" (1.6 m)     Physical Exam   Constitutional: She is oriented to person, place, and time. She appears well-developed and well-nourished. She is cooperative. No distress.   Increased BMI   HENT:   Head: Normocephalic and atraumatic.   Right Ear: Tympanic membrane normal.   Left Ear: Tympanic membrane normal.   Mouth/Throat: Uvula is midline and mucous membranes are normal.   Eyes: Pupils are equal, round, and reactive to light. Conjunctivae and EOM are normal. Right eye exhibits no discharge. Left eye exhibits no discharge. No scleral icterus. Right pupil is round and reactive. Left pupil is round and reactive.   Neck: Trachea normal and normal range of motion. Neck supple. No JVD present. Carotid bruit is not present. No thyromegaly present.   Cardiovascular: Normal rate, regular rhythm and intact distal pulses. Exam reveals no gallop and no friction rub.   No murmur heard.  Pulmonary/Chest: Effort normal and breath sounds normal. No respiratory distress. She has no wheezes. She has no rales.   Abdominal: Soft. Bowel sounds are normal. She exhibits no distension and no mass. There is no tenderness. There is no guarding. No hernia.   Abdominal obesity   Musculoskeletal: Normal range of motion. She exhibits no edema.   Neurological: She is alert and oriented to person, place, and time. She has normal strength.   Skin: Skin is warm and dry. No lesion and no rash noted. No cyanosis. Nails show no clubbing.   Psychiatric: She has a normal mood and affect. Her speech is normal and behavior is normal. Judgment and thought content normal.   Nursing note and vitals reviewed.        Assessment:       1. Hypertension, unspecified type    2. Lumbar radiculopathy    3. Pain of upper abdomen    4. Controlled type 2 diabetes mellitus without complication, without long-term current use of insulin    5. Pure hypercholesterolemia    6. " Interstitial cystitis    7. GERD without esophagitis    8. Chronic renal disease, stage III    9. BMI 40.0-44.9, adult    10. Dysuria    11. Environmental allergies         Plan:       Hypertension, unspecified type  -     Comprehensive metabolic panel; Future; Expected date: 06/13/2019  -     valsartan (DIOVAN) 160 MG tablet; Take 1 tablet (160 mg total) by mouth once daily.  Dispense: 90 tablet; Refill: 1  -     hydroCHLOROthiazide (HYDRODIURIL) 25 MG tablet; Take 1 tablet (25 mg total) by mouth 2 (two) times daily.  Dispense: 180 tablet; Refill: 1    Lumbar radiculopathy        -     Per pain management    Pain of upper abdomen            -     GI referral if sx worsen again    Controlled type 2 diabetes mellitus without complication, without long-term current use of insulin  -     Hemoglobin A1c; Future; Expected date: 06/13/2019  -     Comprehensive metabolic panel; Future; Expected date: 06/13/2019  -     Lipid panel; Future; Expected date: 06/13/2019    Pure hypercholesterolemia  -     Comprehensive metabolic panel; Future; Expected date: 06/13/2019  -     Lipid panel; Future; Expected date: 06/13/2019  -     ezetimibe (ZETIA) 10 mg tablet; Take 1 tablet (10 mg total) by mouth every evening.  Dispense: 90 tablet; Refill: 1    Interstitial cystitis  -     amitriptyline (ELAVIL) 25 MG tablet; Take 1 tablet (25 mg total) by mouth 3 (three) times daily.  Dispense: 270 tablet; Refill: 1  -     hyoscyamine (OSCIMIN SL) 0.125 mg Subl; DISSOLVE 1 TABLET UNDER THE TONGUE THREE TIMES A DAY AS NEEDED  Dispense: 270 tablet; Refill: 1  -     pentosan polysulfate (ELMIRON) 100 mg Cap; Take 1 capsule (100 mg total) by mouth 3 (three) times daily.  Dispense: 270 capsule; Refill: 1    GERD without esophagitis  -     pantoprazole (PROTONIX) 40 MG tablet; Take 1 tablet (40 mg total) by mouth once daily.  Dispense: 90 tablet; Refill: 1    Chronic renal disease, stage III  -     CBC auto differential; Future; Expected date:  06/13/2019    BMI 40.0-44.9, adult    Dysuria  -     Discontinue: ciprofloxacin HCl (CIPRO) 500 MG tablet; Take 0.5 tablets (250 mg total) by mouth 2 (two) times daily.  Dispense: 6 tablet; Refill: 0  -     POCT URINE DIPSTICK WITH MICROSCOPE, AUTOMATED  -     ciprofloxacin HCl (CIPRO) 500 MG tablet; Take 1 tablet (500 mg total) by mouth 2 (two) times daily.  Dispense: 20 tablet; Refill: 0  -     Urinalysis, Complete with Reflex To Culture  -     Microalbumin/creatinine urine ratio    Environmental allergies          Follow up in about 6 months (around 11/13/2019) for FOLLOW UP LABS, FOLLOW-UP STATUS.        5/17/2019 Elda Paredes M.D.

## 2019-05-20 ENCOUNTER — PATIENT MESSAGE (OUTPATIENT)
Dept: FAMILY MEDICINE | Facility: CLINIC | Age: 74
End: 2019-05-20

## 2019-06-06 ENCOUNTER — OFFICE VISIT (OUTPATIENT)
Dept: UROGYNECOLOGY | Facility: CLINIC | Age: 74
End: 2019-06-06
Payer: MEDICARE

## 2019-06-06 VITALS
HEIGHT: 63 IN | BODY MASS INDEX: 45.24 KG/M2 | DIASTOLIC BLOOD PRESSURE: 77 MMHG | SYSTOLIC BLOOD PRESSURE: 168 MMHG | HEART RATE: 96 BPM | WEIGHT: 255.31 LBS

## 2019-06-06 DIAGNOSIS — Z87.440 HISTORY OF RECURRENT UTI (URINARY TRACT INFECTION): ICD-10-CM

## 2019-06-06 DIAGNOSIS — N39.492 POSTURAL (URINARY) INCONTINENCE: ICD-10-CM

## 2019-06-06 DIAGNOSIS — Z46.89 PESSARY MAINTENANCE: ICD-10-CM

## 2019-06-06 DIAGNOSIS — L30.9 DERMATITIS: ICD-10-CM

## 2019-06-06 DIAGNOSIS — R35.0 URINARY FREQUENCY: Primary | ICD-10-CM

## 2019-06-06 DIAGNOSIS — N81.11 CYSTOCELE, MIDLINE: ICD-10-CM

## 2019-06-06 DIAGNOSIS — N95.2 ATROPHIC VAGINITIS: ICD-10-CM

## 2019-06-06 LAB
BILIRUB SERPL-MCNC: NORMAL MG/DL
BLOOD URINE, POC: NORMAL
COLOR, POC UA: YELLOW
GLUCOSE UR QL STRIP: NORMAL
KETONES UR QL STRIP: NORMAL
LEUKOCYTE ESTERASE URINE, POC: NORMAL
NITRITE, POC UA: NORMAL
PH, POC UA: 7
POC RESIDUAL URINE VOLUME: 105 ML (ref 0–100)
PROTEIN, POC: NORMAL
SPECIFIC GRAVITY, POC UA: 1
UROBILINOGEN, POC UA: NORMAL

## 2019-06-06 PROCEDURE — 99214 PR OFFICE/OUTPT VISIT, EST, LEVL IV, 30-39 MIN: ICD-10-PCS | Mod: S$PBB,,, | Performed by: NURSE PRACTITIONER

## 2019-06-06 PROCEDURE — 99999 PR PBB SHADOW E&M-EST. PATIENT-LVL IV: CPT | Mod: PBBFAC,,, | Performed by: NURSE PRACTITIONER

## 2019-06-06 PROCEDURE — 99214 OFFICE O/P EST MOD 30 MIN: CPT | Mod: S$PBB,,, | Performed by: NURSE PRACTITIONER

## 2019-06-06 PROCEDURE — 51798 US URINE CAPACITY MEASURE: CPT | Mod: PBBFAC,PO | Performed by: NURSE PRACTITIONER

## 2019-06-06 PROCEDURE — 87086 URINE CULTURE/COLONY COUNT: CPT

## 2019-06-06 PROCEDURE — 99214 OFFICE O/P EST MOD 30 MIN: CPT | Mod: PBBFAC,PO,25 | Performed by: NURSE PRACTITIONER

## 2019-06-06 PROCEDURE — 99999 PR PBB SHADOW E&M-EST. PATIENT-LVL IV: ICD-10-PCS | Mod: PBBFAC,,, | Performed by: NURSE PRACTITIONER

## 2019-06-06 PROCEDURE — 81002 URINALYSIS NONAUTO W/O SCOPE: CPT | Mod: PBBFAC,PO | Performed by: NURSE PRACTITIONER

## 2019-06-06 RX ORDER — CYCLOBENZAPRINE HCL 10 MG
TABLET ORAL
COMMUNITY
Start: 2019-05-28 | End: 2019-10-17

## 2019-06-06 RX ORDER — NYSTATIN AND TRIAMCINOLONE ACETONIDE 100000; 1 [USP'U]/G; MG/G
CREAM TOPICAL 4 TIMES DAILY
Qty: 30 G | Refills: 1 | Status: SHIPPED | OUTPATIENT
Start: 2019-06-06 | End: 2019-09-12 | Stop reason: SDUPTHER

## 2019-06-06 RX ORDER — CIPROFLOXACIN 500 MG/1
500 TABLET ORAL 2 TIMES DAILY
Qty: 10 TABLET | Refills: 0 | Status: SHIPPED | OUTPATIENT
Start: 2019-06-06 | End: 2019-06-11

## 2019-06-06 NOTE — PROGRESS NOTES
Subjective:       Patient ID: Fransisca Frazier is a 73 y.o. female.    Chief Complaint: 3 month estring    HPI  Fransisca Frazier is a 73 y.o. female who presents today for routine pessary maintenance.  She is doing fine in regards to her estring.  She denies any vaginal complaints/concerns.  She is not happy with her urinary status.  She constantly is having leakage of urine when she moves from a sitting to standing position.  She has not discussed this with her doctor that is caring for her back problems.  She understands that she needs to discuss this with him.  She states that she usually has this problem when she has a UTI, however, her UA today is negative.  She is not sure what could be causing the leakage.    Review of Systems   Constitutional: Negative for activity change, fever and unexpected weight change.   HENT: Negative for hearing loss.    Eyes: Negative for visual disturbance.   Respiratory: Negative for shortness of breath and wheezing.    Cardiovascular: Negative for chest pain, palpitations and leg swelling.   Gastrointestinal: Negative for abdominal pain, constipation and diarrhea.   Genitourinary: Positive for frequency. Negative for dyspareunia, dysuria, urgency, vaginal bleeding and vaginal discharge.   Musculoskeletal: Positive for back pain. Negative for gait problem and neck pain.   Skin: Negative for rash and wound.   Allergic/Immunologic: Negative for immunocompromised state.   Neurological: Negative for tremors, speech difficulty and weakness.   Hematological: Does not bruise/bleed easily.   Psychiatric/Behavioral: Negative for agitation and confusion.       Objective:      Physical Exam   Constitutional: She is oriented to person, place, and time. She appears well-developed and well-nourished. No distress.   HENT:   Head: Normocephalic and atraumatic.   Neck: Neck supple. No thyromegaly present.   Pulmonary/Chest: Effort normal. No respiratory distress.   Abdominal: Soft. There is no  tenderness. No hernia.   Musculoskeletal: Normal range of motion.   Neurological: She is alert and oriented to person, place, and time.   Skin: Skin is warm and dry. No rash noted.   Psychiatric: She has a normal mood and affect. Her behavior is normal.     Pelvic Exam:  V: No lesions. No palpable nodes.   Va: no discharge or bleeding.  Good length.  Midline cystocele Ba=-1  Meatus:No caruncle or stenosis  Urethra: Non tender. No suburethral masses.  Cx/Cuff: Normal   Uterus: small, non-tender  Ad: No mass or tenderness.  Levators :Symmetrical. Normal tone. Non tender.  BL: Non tender  RV: No hemorrhoids.      Assessment:       1. Urinary frequency    2. Cystocele, midline    3. Atrophic vaginitis    4. Pessary maintenance    5. History of recurrent UTI (urinary tract infection)    6. Postural (urinary) incontinence    7. Dermatitis          Procedure note- estring pessary removed with forceps and discarded.  After betadine irrigation of the vagina,  A new estring pessary, which the pt provided was reinserted into the vagina.  Pt ambulated in the room and denies any discomfort.  NP reminded pt that the first 24-48 hours are the most likely time for the pessary to fall out and to monitor the toilet before flushing.  Pt verbalized understanding.      Plan:       Urinary frequency- we will send her urine for culture to be sure it is negative.  -     POCT URINE DIPSTICK WITHOUT MICROSCOPE  -     Urine culture    Cystocele, midline- continue estring    Atrophic vaginitis- continue estring    Pessary maintenance- continue estring    History of recurrent UTI (urinary tract infection)- as noted below  -     ciprofloxacin HCl (CIPRO) 500 MG tablet; Take 1 tablet (500 mg total) by mouth 2 (two) times daily. for 5 days  Dispense: 10 tablet; Refill: 0    Postural (urinary) incontinence- bladder scan revealed 105 ml.  NP did discuss with pt potential ISD, back component to her incontinence.  NP suggested trying myrbetriq, pt did  not want to do this as she has a lot of digestive problems.  NP also discussed CMG to further evaluate the bladder.  Pt is not sure if she wants to do this.  Finally pelvic floor exercises reviewed and info sheet given.  Pt will try this first.  -     POCT Bladder Scan    Dermatitis- as noted below  -     nystatin-triamcinolone (MYCOLOG II) cream; Apply topically 4 (four) times daily. for 10 days  Dispense: 30 g; Refill: 1    RTC 3 months or PRN

## 2019-06-08 LAB — BACTERIA UR CULT: NO GROWTH

## 2019-06-13 LAB
ALBUMIN SERPL-MCNC: 3.9 G/DL (ref 3.1–4.7)
ALP SERPL-CCNC: 60 IU/L (ref 40–104)
ALT (SGPT): 20 IU/L (ref 3–33)
AST SERPL-CCNC: 26 IU/L (ref 10–40)
BASOPHILS NFR BLD: 0 K/UL (ref 0–0.2)
BASOPHILS NFR BLD: 0.5 %
BILIRUB SERPL-MCNC: 0.5 MG/DL (ref 0.3–1)
BUN SERPL-MCNC: 16 MG/DL (ref 8–20)
CALCIUM SERPL-MCNC: 9.9 MG/DL (ref 7.7–10.4)
CHLORIDE: 94 MMOL/L (ref 98–110)
CO2 SERPL-SCNC: 31.4 MMOL/L (ref 22.8–31.6)
CREATININE: 1 MG/DL (ref 0.6–1.4)
EOSINOPHIL NFR BLD: 0.6 K/UL (ref 0–0.7)
EOSINOPHIL NFR BLD: 7 %
ERYTHROCYTE [DISTWIDTH] IN BLOOD BY AUTOMATED COUNT: 13.2 % (ref 11.7–14.9)
GLUCOSE: 99 MG/DL (ref 70–99)
GRAN #: 4.7 K/UL (ref 1.4–6.5)
GRAN%: 56.8 %
HCT VFR BLD AUTO: 38.2 % (ref 36–48)
HGB BLD-MCNC: 13 G/DL (ref 12–15)
IMMATURE GRANS (ABS): 0 K/UL (ref 0–1)
IMMATURE GRANULOCYTES: 0.4 %
LYMPH #: 1.9 K/UL (ref 1.2–3.4)
LYMPH%: 23.1 %
MCH RBC QN AUTO: 32.3 PG (ref 25–35)
MCHC RBC AUTO-ENTMCNC: 34 G/DL (ref 31–36)
MCV RBC AUTO: 94.8 FL (ref 79–98)
MONO #: 1 K/UL (ref 0.1–0.6)
MONO%: 12.2 %
NUCLEATED RBCS: 0 %
PLATELET # BLD AUTO: 307 K/UL (ref 140–440)
PMV BLD AUTO: 11.2 FL (ref 8.8–12.7)
POTASSIUM SERPL-SCNC: 3.6 MMOL/L (ref 3.5–5)
PROT SERPL-MCNC: 7.6 G/DL (ref 6–8.2)
RBC # BLD AUTO: 4.03 M/UL (ref 3.5–5.5)
SODIUM: 134 MMOL/L (ref 134–144)
WBC # BLD AUTO: 8.2 K/UL (ref 5–10)

## 2019-06-14 LAB — HBA1C MFR BLD: 5.7 % (ref 3.1–6.5)

## 2019-06-17 ENCOUNTER — TELEPHONE (OUTPATIENT)
Dept: FAMILY MEDICINE | Facility: CLINIC | Age: 74
End: 2019-06-17

## 2019-06-17 ENCOUNTER — PATIENT MESSAGE (OUTPATIENT)
Dept: FAMILY MEDICINE | Facility: CLINIC | Age: 74
End: 2019-06-17

## 2019-08-16 ENCOUNTER — TELEPHONE (OUTPATIENT)
Dept: UROLOGY | Facility: CLINIC | Age: 74
End: 2019-08-16

## 2019-08-16 ENCOUNTER — NURSE TRIAGE (OUTPATIENT)
Dept: ADMINISTRATIVE | Facility: CLINIC | Age: 74
End: 2019-08-16

## 2019-08-16 NOTE — TELEPHONE ENCOUNTER
----- Message from Kimber Landeros sent at 8/16/2019  3:10 PM CDT -----  Type:  Sooner Apoointment Request    Caller is requesting a sooner appointment.  Caller declined first available appointment listed below.  Caller will not accept being placed on the waitlist and is requesting a message be sent to doctor.    Name of Caller:  Jurgen   When is the first available appointment?  Symptoms:    Best Call Back Number: [phone]  Additional Information: Patient was seen at [place], pt was not able to void  Patient has a catheter, patient was advised to schedule with the doctor next week.

## 2019-08-16 NOTE — TELEPHONE ENCOUNTER
Had back surgery on yesterday. Has drank a fair amount of fluids since arriving back home about 7 pm. Has voided a minimal amount. 30 min ago voided about 2-3 ounces no urgency. [facility]    Reason for Disposition   Nursing judgment    Protocols used: NO GUIDELINE OR REFERENCE [id number]

## 2019-08-26 DIAGNOSIS — N95.2 ATROPHIC VAGINITIS: ICD-10-CM

## 2019-08-26 DIAGNOSIS — Z87.440 HISTORY OF RECURRENT UTIS: ICD-10-CM

## 2019-08-27 RX ORDER — ESTRADIOL 2 MG/1
SYSTEM VAGINAL
Qty: 1 EACH | Refills: 4 | Status: SHIPPED | OUTPATIENT
Start: 2019-08-27 | End: 2020-11-10 | Stop reason: SDUPTHER

## 2019-09-12 ENCOUNTER — OFFICE VISIT (OUTPATIENT)
Dept: FAMILY MEDICINE | Facility: CLINIC | Age: 74
End: 2019-09-12
Payer: MEDICARE

## 2019-09-12 VITALS
HEIGHT: 63 IN | SYSTOLIC BLOOD PRESSURE: 138 MMHG | OXYGEN SATURATION: 95 % | BODY MASS INDEX: 44.65 KG/M2 | DIASTOLIC BLOOD PRESSURE: 72 MMHG | RESPIRATION RATE: 16 BRPM | HEART RATE: 80 BPM | WEIGHT: 252 LBS

## 2019-09-12 DIAGNOSIS — I10 HYPERTENSION, UNSPECIFIED TYPE: ICD-10-CM

## 2019-09-12 DIAGNOSIS — L30.9 DERMATITIS: ICD-10-CM

## 2019-09-12 DIAGNOSIS — Z09 HOSPITAL DISCHARGE FOLLOW-UP: Primary | ICD-10-CM

## 2019-09-12 PROCEDURE — 99213 OFFICE O/P EST LOW 20 MIN: CPT | Mod: PBBFAC | Performed by: NURSE PRACTITIONER

## 2019-09-12 PROCEDURE — 99999 PR PBB SHADOW E&M-EST. PATIENT-LVL III: ICD-10-PCS | Mod: PBBFAC,,, | Performed by: NURSE PRACTITIONER

## 2019-09-12 PROCEDURE — 99214 OFFICE O/P EST MOD 30 MIN: CPT | Mod: S$PBB,,, | Performed by: NURSE PRACTITIONER

## 2019-09-12 PROCEDURE — 99214 PR OFFICE/OUTPT VISIT, EST, LEVL IV, 30-39 MIN: ICD-10-PCS | Mod: S$PBB,,, | Performed by: NURSE PRACTITIONER

## 2019-09-12 PROCEDURE — 99999 PR PBB SHADOW E&M-EST. PATIENT-LVL III: CPT | Mod: PBBFAC,,, | Performed by: NURSE PRACTITIONER

## 2019-09-12 RX ORDER — AMLODIPINE BESYLATE 5 MG/1
5 TABLET ORAL DAILY
Refills: 0 | COMMUNITY
Start: 2019-09-10 | End: 2019-10-17

## 2019-09-12 RX ORDER — NYSTATIN AND TRIAMCINOLONE ACETONIDE 100000; 1 [USP'U]/G; MG/G
CREAM TOPICAL 4 TIMES DAILY
Qty: 60 G | Refills: 1 | Status: SHIPPED | OUTPATIENT
Start: 2019-09-12 | End: 2020-07-29 | Stop reason: SDUPTHER

## 2019-09-12 RX ORDER — TRAZODONE HYDROCHLORIDE 50 MG/1
50 TABLET ORAL NIGHTLY
Refills: 0 | COMMUNITY
Start: 2019-09-10 | End: 2019-09-12

## 2019-09-12 RX ORDER — TRAMADOL HYDROCHLORIDE 50 MG/1
50 TABLET ORAL DAILY
Refills: 0 | COMMUNITY
Start: 2019-09-09 | End: 2019-10-17

## 2019-09-12 RX ORDER — OXYCODONE AND ACETAMINOPHEN 5; 325 MG/1; MG/1
TABLET ORAL
Refills: 0 | COMMUNITY
Start: 2019-08-15 | End: 2019-10-17

## 2019-09-12 NOTE — PROGRESS NOTES
SUBJECTIVE:      Patient ID: Fransisca Frazier is a 73 y.o. female.    Chief Complaint: No chief complaint on file.    Presents for hospital discharge follow-up - patient of Dr. Paredes, unknown to me prior to this visit, states she recently had L5-S1 surgery for disc herniation 8/15 with Dr. Aburto at [facility], denies any complications with surgery,  present at visit and contributing to HPI, he and patient state she had trouble voiding for about 24 hours s/p surgery & started with severe ongoing constant back pain, she went to [place] for cath procedure to help with voiding & was admitted to [place] for possible PNA, she was told during that admission that a hematoma formed after surgery & was causing the pain, she was inpatient at [place] 8/16-8/19, transferred to [facility] 8/19-9/12, still c/o dull back pain considerably improved, getting more independent with ADLs, saw Dr. Aburto yesterday for follow-up - he states the hematoma is resolving slowly, patient states pain radiates down L leg still but stopping around knee for the most part which is an improvement from traveling all the way to the foot, occasionally travels to lower leg, having [facility] starting this afternoon for ST/OT/PT, patient's  states some cognitive decline has been noted with opiods  Hypertension   This is a chronic problem. The current episode started more than 1 month ago. The problem is controlled. Pertinent negatives include no chest pain, neck pain, palpitations or shortness of breath. Risk factors for coronary artery disease include dyslipidemia, obesity, post-menopausal state, sedentary lifestyle, stress and diabetes mellitus. Past treatments include angiotensin blockers, diuretics and calcium channel blockers. The current treatment provides mild improvement. Compliance problems include exercise and diet.    Back Pain   This is a new problem. The current episode started 1 to 4 weeks ago. The problem occurs daily. The  problem has been gradually improving since onset. The pain is present in the lumbar spine. The quality of the pain is described as aching. The pain radiates to the left thigh. The pain is at a severity of 6/10. The pain is moderate. Associated symptoms include weakness. Pertinent negatives include no abdominal pain, chest pain, dysuria, numbness or pelvic pain. Risk factors include lack of exercise, menopause, obesity, poor posture, recent trauma and sedentary lifestyle. She has tried walking, analgesics, bed rest and muscle relaxant for the symptoms. The treatment provided moderate relief.       Past Surgical History:   Procedure Laterality Date    CHOLECYSTECTOMY  2018    CHOLECYSTECTOMY-LAPAROSCOPIC N/A 2018    [facility]    CYSTOSCOPY      D & C      DILATION AND CURETTAGE OF UTERUS      EXTRACTION-CATARACT-IOL Right 2014    [facility]    EYE SURGERY      bilat cataract     Family History   Problem Relation Age of Onset    Cancer Father     Cancer Sister     Cancer Brother     Cancer Brother       Social History     Socioeconomic History    Marital status:      Spouse name: Not on file    Number of children: Not on file    Years of education: Not on file    Highest education level: Not on file   Occupational History    Not on file   Social Needs    Financial resource strain: Not on file    Food insecurity:     Worry: Not on file     Inability: Not on file    Transportation needs:     Medical: Not on file     Non-medical: Not on file   Tobacco Use    Smoking status: Former Smoker     Packs/day: 1.00     Years: 32.00     Pack years: 32.00     Types: Cigarettes     Last attempt to quit: 1993     Years since quittin.1    Smokeless tobacco: Never Used   Substance and Sexual Activity    Alcohol use: Yes     Comment: occasional  2018    Drug use: No    Sexual activity: Not on file   Lifestyle     Physical activity:     Days per week: Not on file     Minutes per session: Not on file    Stress: Not on file   Relationships    Social connections:     Talks on phone: Not on file     Gets together: Not on file     Attends Restorationism service: Not on file     Active member of club or organization: Not on file     Attends meetings of clubs or organizations: Not on file     Relationship status: Not on file   Other Topics Concern    Not on file   Social History Narrative    Not on file     Current Outpatient Medications   Medication Sig Dispense Refill    alendronate (FOSAMAX) 70 MG tablet Take 1 tablet (70 mg total) by mouth every 7 days. 12 tablet 1    amitriptyline (ELAVIL) 25 MG tablet Take 1 tablet (25 mg total) by mouth 3 (three) times daily. 270 tablet 1    amLODIPine (NORVASC) 5 MG tablet Take 5 mg by mouth Daily.  0    cyclobenzaprine (FLEXERIL) 10 MG tablet       ergocalciferol (ERGOCALCIFEROL) 50,000 unit Cap Take 1 capsule by mouth once a week.      ESTRING 2 mg (7.5 mcg /24 hour) vaginal ring INSERT 1 RING VAGINALLY EVERY 3 MONTHS 1 each 4    ezetimibe (ZETIA) 10 mg tablet Take 1 tablet (10 mg total) by mouth every evening. 90 tablet 1    gabapentin (NEURONTIN) 300 MG capsule TK 1 C PO QHS  2    glucosamine-chondroitin 500-400 mg tablet Take 1 tablet by mouth once daily.      hydroCHLOROthiazide (HYDRODIURIL) 25 MG tablet Take 1 tablet (25 mg total) by mouth 2 (two) times daily. 180 tablet 1    hyoscyamine (OSCIMIN SL) 0.125 mg Subl DISSOLVE 1 TABLET UNDER THE TONGUE THREE TIMES A DAY AS NEEDED 270 tablet 1    KLOR-CON M20 20 mEq tablet Take 10 tablets by mouth once daily.       loratadine (CLARITIN) 10 mg tablet Take 10 mg by mouth once daily.      nystatin-triamcinolone (MYCOLOG II) cream Apply topically 4 (four) times daily. for 10 days 60 g 1    oxyCODONE-acetaminophen (PERCOCET) 5-325 mg per tablet TK 1 T PO Q 4 H PRN P  0    pantoprazole (PROTONIX) 40 MG tablet Take 1 tablet  (40 mg total) by mouth once daily. 90 tablet 1    pentosan polysulfate (ELMIRON) 100 mg Cap Take 1 capsule (100 mg total) by mouth 3 (three) times daily. 270 capsule 1    traMADol (ULTRAM) 50 mg tablet Take 50 mg by mouth Daily.  0    valsartan (DIOVAN) 160 MG tablet Take 1 tablet (160 mg total) by mouth once daily. 90 tablet 1     No current facility-administered medications for this visit.      Review of patient's allergies indicates:  No Known Allergies   Past Medical History:   Diagnosis Date    Back pain     Cystitis, interstitial     Hypertension     Uncontrolled type 2 diabetes mellitus without complication, without long-term current use of insulin 1/22/2018    Wears glasses     Wears partial dentures     upper     Past Surgical History:   Procedure Laterality Date    CHOLECYSTECTOMY  02/21/2018    CHOLECYSTECTOMY-LAPAROSCOPIC N/A 2/21/2018    [facility]    CYSTOSCOPY      D & C      DILATION AND CURETTAGE OF UTERUS      EXTRACTION-CATARACT-IOL Right 8/18/2014    [facility] OR    EYE SURGERY      bilat cataract       Review of Systems   Constitutional: Positive for fatigue. Negative for activity change, appetite change and unexpected weight change.   HENT: Negative for congestion, ear pain, hearing loss, postnasal drip, sinus pressure, sinus pain, sneezing and sore throat.    Eyes: Negative for photophobia and pain.   Respiratory: Negative for cough, chest tightness, shortness of breath and wheezing.    Cardiovascular: Negative for chest pain, palpitations and leg swelling.   Gastrointestinal: Negative for abdominal distention, abdominal pain, blood in stool, constipation, diarrhea, nausea and vomiting.   Endocrine: Negative for cold intolerance, heat intolerance, polydipsia and polyuria.   Genitourinary: Negative for difficulty urinating, dysuria, flank pain, frequency, hematuria, pelvic pain and urgency.   Musculoskeletal:  "Positive for back pain (6/10). Negative for arthralgias, joint swelling, myalgias and neck pain.   Skin: Positive for rash (under breasts & abd folds). Negative for pallor.   Allergic/Immunologic: Negative for environmental allergies and food allergies.   Neurological: Positive for weakness. Negative for dizziness, light-headedness and numbness.   Hematological: Does not bruise/bleed easily.   Psychiatric/Behavioral: Negative for agitation, confusion, decreased concentration and sleep disturbance. The patient is not nervous/anxious.       OBJECTIVE:      Vitals:    09/12/19 1057   BP: 138/72   Pulse: 80   Resp: 16   SpO2: 95%   Weight: 114.3 kg (252 lb)   Height: 5' 3" (1.6 m)     Physical Exam   Constitutional: She is oriented to person, place, and time. Vital signs are normal. She appears well-developed and well-nourished. No distress.   obese   HENT:   Head: Normocephalic and atraumatic.   Right Ear: Hearing normal.   Left Ear: Hearing normal.   Nose: Nose normal. No rhinorrhea.   Mouth/Throat: Mucous membranes are normal.   Eyes: Pupils are equal, round, and reactive to light. Conjunctivae and lids are normal. Right eye exhibits no discharge. Left eye exhibits no discharge. Right conjunctiva is not injected. Left conjunctiva is not injected. Right pupil is round and reactive. Left pupil is round and reactive. Pupils are equal.   Neck: Trachea normal and normal range of motion. Neck supple. No JVD present. No tracheal deviation present. No thyromegaly present.   Cardiovascular: Normal rate, regular rhythm, normal heart sounds and intact distal pulses. Exam reveals no gallop and no friction rub.   No murmur heard.  Pulses:       Radial pulses are 2+ on the right side, and 2+ on the left side.   Pulmonary/Chest: Effort normal and breath sounds normal. No stridor. No respiratory distress. She has no decreased breath sounds. She has no wheezes. She has no rhonchi. She has no rales.   Abdominal: Soft. Bowel sounds are " normal. She exhibits no distension. There is no tenderness. There is no rigidity and no guarding.   Musculoskeletal: Normal range of motion. She exhibits no edema.        Lumbar back: She exhibits tenderness.        Back:    Lymphadenopathy:     She has no cervical adenopathy.   Neurological: She is alert and oriented to person, place, and time. She has normal strength. She displays no atrophy. Gait abnormal. Coordination normal.   Skin: Skin is warm and dry. Capillary refill takes less than 2 seconds. Rash noted. No lesion noted. Rash is maculopapular (abd folds). No cyanosis. No pallor.   Psychiatric: She has a normal mood and affect. Her speech is normal and behavior is normal. Judgment and thought content normal. Cognition and memory are normal. She is attentive.   Nursing note and vitals reviewed.     Assessment:       1. Hospital discharge follow-up    2. Dermatitis    3. Hypertension, unspecified type        Plan:       Hospital discharge follow-up        - med reconciliation performed at this visit: started on tramadol 50 mg BID, norvasc 5 mg (she is taking this intermittently as it makes her fatigued)        - pt is trying to wean herself off pain meds which were started before hospitalization        - discussed taking amlodipine at night to ease fatigue & continue to check BP at home    Dermatitis  -     nystatin-triamcinolone (MYCOLOG II) cream; Apply topically 4 (four) times daily. for 10 days  Dispense: 60 g; Refill: 1    Hypertension, unspecified type        - stable on meds at this visit        - encouraged to take amlodipine at night to ease SE of fatigue        - continue to check BP regularly at home as we discussed possible elevations r/t increased pain        Follow up if symptoms worsen or fail to improve.      9/16/2019 JHOAN Nielsen, FNP-C

## 2019-09-16 PROBLEM — L30.9 DERMATITIS: Status: ACTIVE | Noted: 2019-09-16

## 2019-09-16 PROBLEM — I10 HYPERTENSION: Status: ACTIVE | Noted: 2019-09-16

## 2019-09-18 ENCOUNTER — TELEPHONE (OUTPATIENT)
Dept: FAMILY MEDICINE | Facility: CLINIC | Age: 74
End: 2019-09-18

## 2019-09-18 ENCOUNTER — TELEPHONE (OUTPATIENT)
Dept: UROGYNECOLOGY | Facility: CLINIC | Age: 74
End: 2019-09-18

## 2019-09-18 NOTE — TELEPHONE ENCOUNTER
----- Message from Misha Tracy sent at 9/18/2019  1:15 PM CDT -----  Contact: pt  Sadiq  Type: Needs Medical Advice    Who Called:  Sadiq Cavanaugh Call Back Number: [phone]  Additional Information: pt was recently hospitalized at [place] and Sadiq is wanting to know how urgent the appt should be to get the device removed from the pt. Please call to advise

## 2019-09-19 ENCOUNTER — OFFICE VISIT (OUTPATIENT)
Dept: UROGYNECOLOGY | Facility: CLINIC | Age: 74
End: 2019-09-19
Payer: MEDICARE

## 2019-09-19 VITALS
SYSTOLIC BLOOD PRESSURE: 186 MMHG | HEIGHT: 63 IN | BODY MASS INDEX: 43.68 KG/M2 | WEIGHT: 246.5 LBS | DIASTOLIC BLOOD PRESSURE: 88 MMHG | HEART RATE: 108 BPM

## 2019-09-19 DIAGNOSIS — R35.0 URINARY FREQUENCY: Primary | ICD-10-CM

## 2019-09-19 DIAGNOSIS — N95.2 ATROPHIC VAGINITIS: ICD-10-CM

## 2019-09-19 DIAGNOSIS — M54.9 BACK PAIN, UNSPECIFIED BACK LOCATION, UNSPECIFIED BACK PAIN LATERALITY, UNSPECIFIED CHRONICITY: ICD-10-CM

## 2019-09-19 DIAGNOSIS — N81.11 CYSTOCELE, MIDLINE: ICD-10-CM

## 2019-09-19 DIAGNOSIS — Z87.440 HISTORY OF RECURRENT UTI (URINARY TRACT INFECTION): ICD-10-CM

## 2019-09-19 DIAGNOSIS — N39.8 VOIDING DYSFUNCTION: ICD-10-CM

## 2019-09-19 DIAGNOSIS — Z46.89 PESSARY MAINTENANCE: ICD-10-CM

## 2019-09-19 LAB
BILIRUB SERPL-MCNC: ABNORMAL MG/DL
BLOOD URINE, POC: ABNORMAL
COLOR, POC UA: YELLOW
GLUCOSE UR QL STRIP: ABNORMAL
KETONES UR QL STRIP: ABNORMAL
LEUKOCYTE ESTERASE URINE, POC: ABNORMAL
NITRITE, POC UA: ABNORMAL
PH, POC UA: 7
POC RESIDUAL URINE VOLUME: 106 ML (ref 0–100)
PROTEIN, POC: ABNORMAL
SPECIFIC GRAVITY, POC UA: 1010
UROBILINOGEN, POC UA: ABNORMAL

## 2019-09-19 PROCEDURE — 99214 OFFICE O/P EST MOD 30 MIN: CPT | Mod: PBBFAC,PO | Performed by: NURSE PRACTITIONER

## 2019-09-19 PROCEDURE — 99213 PR OFFICE/OUTPT VISIT, EST, LEVL III, 20-29 MIN: ICD-10-PCS | Mod: S$PBB,,, | Performed by: NURSE PRACTITIONER

## 2019-09-19 PROCEDURE — 99999 PR PBB SHADOW E&M-EST. PATIENT-LVL IV: ICD-10-PCS | Mod: PBBFAC,,, | Performed by: NURSE PRACTITIONER

## 2019-09-19 PROCEDURE — 99999 PR PBB SHADOW E&M-EST. PATIENT-LVL IV: CPT | Mod: PBBFAC,,, | Performed by: NURSE PRACTITIONER

## 2019-09-19 PROCEDURE — 81002 URINALYSIS NONAUTO W/O SCOPE: CPT | Mod: PBBFAC,PO | Performed by: NURSE PRACTITIONER

## 2019-09-19 PROCEDURE — 51798 US URINE CAPACITY MEASURE: CPT | Mod: PBBFAC,PO | Performed by: NURSE PRACTITIONER

## 2019-09-19 PROCEDURE — 99213 OFFICE O/P EST LOW 20 MIN: CPT | Mod: S$PBB,,, | Performed by: NURSE PRACTITIONER

## 2019-09-19 RX ORDER — NALOXEGOL OXALATE 25 MG/1
TABLET, FILM COATED ORAL
Refills: 5 | COMMUNITY
Start: 2019-09-12 | End: 2019-10-17

## 2019-09-19 NOTE — PROGRESS NOTES
"Subjective:       Patient ID: Fransisca Frazier is a 73 y.o. female.    Chief Complaint: estring changed    HPI  Fransisca Frazier is a 73 y.o. female who presents today for estring change.  She was last seen on 06/06/19.  Since that time she has had back surgery, but with the back surgery, developed a hematoma.  She spent most of the month of august in the hospital or rehab.  She was recently discharged home and she feels that she is doing OK.  She is not really having any pain, but she is having problems getting back to her ADL's.  She denies any vaginal discharge, bleeding or bulging.  She is having some difficulty urinating at times.  She states that it was worse when she first had the back surgery.  However, she still feels like she might not be getting her bladder completely empty.  She is also having some nausea, decreased appetite and sweating.  She stopped taking her pain medication completely and is wondering if going "cold turkey" on this might be contributing to some of the symptoms she is having.  She denies any other urogyn complaints/concerns at this time.    Review of Systems   Constitutional: Negative for activity change, fever and unexpected weight change.   HENT: Negative for hearing loss.    Eyes: Negative for visual disturbance.   Respiratory: Negative for shortness of breath and wheezing.    Cardiovascular: Negative for chest pain, palpitations and leg swelling.   Gastrointestinal: Positive for nausea. Negative for abdominal pain, constipation and diarrhea.   Genitourinary: Positive for difficulty urinating, frequency and urgency. Negative for dyspareunia, dysuria, vaginal bleeding and vaginal discharge.   Musculoskeletal: Negative for gait problem and neck pain.   Skin: Negative for rash and wound.   Allergic/Immunologic: Negative for immunocompromised state.   Neurological: Negative for tremors, speech difficulty and weakness.   Hematological: Does not bruise/bleed easily. "   Psychiatric/Behavioral: Negative for agitation and confusion.       Objective:      Physical Exam   Constitutional: She is oriented to person, place, and time. She appears well-developed and well-nourished. No distress.   HENT:   Head: Normocephalic and atraumatic.   Neck: Neck supple. No thyromegaly present.   Pulmonary/Chest: Effort normal. No respiratory distress.   Abdominal: Soft. There is no tenderness. No hernia.   Musculoskeletal: Normal range of motion.   Neurological: She is alert and oriented to person, place, and time.   Skin: Skin is warm and dry. No rash noted.   Psychiatric: She has a normal mood and affect. Her behavior is normal.     Pelvic Exam:  V: No lesions. No palpable nodes.   Va: no discharge or bleeding.  Good length.  Dominant midline cystocele Ba=0  Meatus:No caruncle or stenosis  Urethra: Non tender. No suburethral masses.  Cx/Cuff: Normal   Uterus: small, non-tender  Ad: No mass or tenderness.  Levators :Symmetrical. Normal tone. Non tender.  BL: Non tender  RV: No hemorrhoids.      Assessment:       1. Urinary frequency    2. Atrophic vaginitis    3. Cystocele, midline    4. Pessary maintenance    5. History of recurrent UTI (urinary tract infection)    6. Voiding dysfunction    7. Back pain, unspecified back location, unspecified back pain laterality, unspecified chronicity          Procedure note- estring pessary removed and discarded.  After betadine irrigation of the vagina, a new estring pessary, which the pt provided was reinserted into the vagina.  Pt ambulated in the room and denies any discomfort.  NP reminded pt that the first 24-48 hours are the most likely time for the pessary to fall out and to monitor the toilet before flushing.  Pt verbalized understanding.      Plan:       Urinary frequency- monitor  -     POCT URINE DIPSTICK WITHOUT MICROSCOPE    Atrophic vaginitis- continue estring    Cystocele, midline- as noted above    Pessary maintenance- as noted  above    History of recurrent UTI (urinary tract infection)- monitor    Voiding dysfunction- bladder scan showed 106 ml residual.  This was done approx. 30 minutes after the pt had urinated.  If the pt continues to feel that she is not able to empty her bladder well, we will begin flomax.  -     POCT Bladder Scan    Back pain, unspecified back location, unspecified back pain laterality, unspecified chronicity- pt has pain management that she sees and pt wants to be off all pain medication.  She hasn't had any pain medication for 3 days which might possibly be contributing to some of her nausea and sweating.  Np discussed this and follow up with pain management.    RTC 1 month to check bladder status.

## 2019-10-17 ENCOUNTER — OFFICE VISIT (OUTPATIENT)
Dept: FAMILY MEDICINE | Facility: CLINIC | Age: 74
End: 2019-10-17
Payer: MEDICARE

## 2019-10-17 ENCOUNTER — OFFICE VISIT (OUTPATIENT)
Dept: UROGYNECOLOGY | Facility: CLINIC | Age: 74
End: 2019-10-17
Payer: MEDICARE

## 2019-10-17 VITALS
HEART RATE: 82 BPM | OXYGEN SATURATION: 98 % | RESPIRATION RATE: 18 BRPM | HEIGHT: 63 IN | WEIGHT: 241 LBS | SYSTOLIC BLOOD PRESSURE: 138 MMHG | BODY MASS INDEX: 42.7 KG/M2 | DIASTOLIC BLOOD PRESSURE: 62 MMHG | TEMPERATURE: 98 F

## 2019-10-17 VITALS
DIASTOLIC BLOOD PRESSURE: 68 MMHG | SYSTOLIC BLOOD PRESSURE: 129 MMHG | HEIGHT: 63 IN | WEIGHT: 241.63 LBS | BODY MASS INDEX: 42.81 KG/M2 | HEART RATE: 90 BPM

## 2019-10-17 DIAGNOSIS — R73.01 IFG (IMPAIRED FASTING GLUCOSE): ICD-10-CM

## 2019-10-17 DIAGNOSIS — N81.11 CYSTOCELE, MIDLINE: ICD-10-CM

## 2019-10-17 DIAGNOSIS — R35.0 URINARY FREQUENCY: Primary | ICD-10-CM

## 2019-10-17 DIAGNOSIS — Z79.899 ENCOUNTER FOR MONITORING DIURETIC THERAPY: ICD-10-CM

## 2019-10-17 DIAGNOSIS — N95.2 ATROPHIC VAGINITIS: ICD-10-CM

## 2019-10-17 DIAGNOSIS — N39.8 VOIDING DYSFUNCTION: ICD-10-CM

## 2019-10-17 DIAGNOSIS — Z51.81 ENCOUNTER FOR MONITORING DIURETIC THERAPY: ICD-10-CM

## 2019-10-17 DIAGNOSIS — R60.0 LOCALIZED EDEMA: Primary | ICD-10-CM

## 2019-10-17 LAB
BILIRUB SERPL-MCNC: NORMAL MG/DL
BLOOD URINE, POC: NORMAL
COLOR, POC UA: YELLOW
GLUCOSE UR QL STRIP: NORMAL
KETONES UR QL STRIP: NORMAL
LEUKOCYTE ESTERASE URINE, POC: NORMAL
NITRITE, POC UA: NORMAL
PH, POC UA: 7
POC RESIDUAL URINE VOLUME: 51 ML (ref 0–100)
PROTEIN, POC: NORMAL
SPECIFIC GRAVITY, POC UA: 1
UROBILINOGEN, POC UA: NORMAL

## 2019-10-17 PROCEDURE — 99213 OFFICE O/P EST LOW 20 MIN: CPT | Mod: S$PBB,,, | Performed by: INTERNAL MEDICINE

## 2019-10-17 PROCEDURE — 99214 OFFICE O/P EST MOD 30 MIN: CPT | Mod: PBBFAC,PO | Performed by: NURSE PRACTITIONER

## 2019-10-17 PROCEDURE — 99999 PR PBB SHADOW E&M-EST. PATIENT-LVL IV: CPT | Mod: PBBFAC,,, | Performed by: NURSE PRACTITIONER

## 2019-10-17 PROCEDURE — 99999 PR PBB SHADOW E&M-EST. PATIENT-LVL IV: ICD-10-PCS | Mod: PBBFAC,,, | Performed by: NURSE PRACTITIONER

## 2019-10-17 PROCEDURE — 99213 PR OFFICE/OUTPT VISIT, EST, LEVL III, 20-29 MIN: ICD-10-PCS | Mod: S$PBB,,, | Performed by: INTERNAL MEDICINE

## 2019-10-17 PROCEDURE — 51798 US URINE CAPACITY MEASURE: CPT | Mod: PBBFAC,PO | Performed by: NURSE PRACTITIONER

## 2019-10-17 PROCEDURE — 99213 OFFICE O/P EST LOW 20 MIN: CPT | Mod: S$PBB,,, | Performed by: NURSE PRACTITIONER

## 2019-10-17 PROCEDURE — 81002 URINALYSIS NONAUTO W/O SCOPE: CPT | Mod: PBBFAC,PO | Performed by: NURSE PRACTITIONER

## 2019-10-17 PROCEDURE — 99214 OFFICE O/P EST MOD 30 MIN: CPT | Performed by: INTERNAL MEDICINE

## 2019-10-17 PROCEDURE — 99213 PR OFFICE/OUTPT VISIT, EST, LEVL III, 20-29 MIN: ICD-10-PCS | Mod: S$PBB,,, | Performed by: NURSE PRACTITIONER

## 2019-10-17 RX ORDER — CETIRIZINE HYDROCHLORIDE 10 MG/1
10 TABLET ORAL DAILY
COMMUNITY
End: 2020-01-29

## 2019-10-17 NOTE — PROGRESS NOTES
"  SUBJECTIVE:    Patient ID: Fransisca Frazier is a 74 y.o. female.    Chief Complaint: Edema and Follow-up    HPI     Patient in for follow-up of edema-most recent labs include a BUN is 16, creatinine 1.0, sodium 134, potassium 3.6, glucose 99, A1c 5.7.  GFR 54 cc.  Cholesterol 174 with 70 HDL  80 LDL triglycerides 81.  Hemoglobin 13 hematocrit 38 WBC 8200 platelets [id number]    Her edema is much improved.    Since here last she had lumbar surgery and had complications of opioids with constipation, and she had significant pain not relieved with the rx and stopped the rx cold turkey and had withdrawals and had to go back on the rx-just finished tapering the rx to none.    Last 3 sets of Vitals    Vitals - 1 value per visit 9/19/2019 10/17/2019 10/17/2019   SYSTOLIC 186 138 129   DIASTOLIC 88 62 68   PULSE 108 82 90   TEMPERATURE - 98.4 -   RESPIRATIONS - 18 -   SPO2 - 98 -   Weight (lb) 246.47 241 241.62   Weight (kg) 111.8 109.317 109.6   HEIGHT 5' 3" 5' 3" 5' 3"   BODY MASS INDEX 43.66 42.69 42.8   VISIT REPORT - - -   Pain Score  0 2 2       Office Visit on 10/17/2019   Component Date Value Ref Range Status    POC Residual Urine Volume 10/17/2019 51  0 - 100 mL Final    Color, UA 10/17/2019 yellow   Final    Spec Grav UA 10/17/2019 1.000   Final    pH, UA 10/17/2019 7   Final    WBC, UA 10/17/2019 neg   Final    Nitrite, UA 10/17/2019 neg   Final    Protein 10/17/2019 neg   Final    Glucose, UA 10/17/2019 neg   Final    Ketones, UA 10/17/2019 neg   Final    Urobilinogen, UA 10/17/2019 neg   Final    Bilirubin 10/17/2019 neg   Final    Blood, UA 10/17/2019 neg   Final   Office Visit on 09/19/2019   Component Date Value Ref Range Status    Color, UA 09/19/2019 yellow   Final    Spec Grav UA 09/19/2019 1,010   Final    pH, UA 09/19/2019 7   Final    WBC, UA 09/19/2019 neg   Final    Nitrite, UA 09/19/2019 neg   Final    Protein 09/19/2019 trace   Final    Glucose, UA 09/19/2019 neg   Final    Ketones, " UA 09/19/2019 neg   Final    Urobilinogen, UA 09/19/2019 neg   Final    Bilirubin 09/19/2019 neg   Final    Blood, UA 09/19/2019 neg   Final    POC Residual Urine Volume 09/19/2019 106* 0 - 100 mL Final   Orders Only on 06/13/2019   Component Date Value Ref Range Status    Glucose 06/13/2019 99  70 - 99 mg/dL Final    BUN, Bld 06/13/2019 16  8 - 20 mg/dL Final    Creatinine 06/13/2019 1.00  0.60 - 1.40 mg/dL Final    Calcium 06/13/2019 9.9  7.7 - 10.4 mg/dL Final    Sodium 06/13/2019 134  134 - 144 mmol/L Final    Potassium 06/13/2019 3.6  3.5 - 5.0 mmol/L Final    Chloride 06/13/2019 94* 98 - 110 mmol/L Final    CO2 06/13/2019 31.4  22.8 - 31.6 mmol/L Final    Albumin 06/13/2019 3.9  3.1 - 4.7 g/dL Final    Total Bilirubin 06/13/2019 0.5  0.3 - 1.0 mg/dL Final    Alkaline Phosphatase 06/13/2019 60  40 - 104 IU/L Final    Total Protein 06/13/2019 7.6  6.0 - 8.2 g/dL Final    ALT (SGPT) 06/13/2019 20  3 - 33 IU/L Final    AST 06/13/2019 26  10 - 40 IU/L Final    Hemoglobin A1C 06/13/2019 5.7  3.1 - 6.5 % Final   Orders Only on 06/13/2019   Component Date Value Ref Range Status    WBC 06/13/2019 8.2  5.0 - 10.0 K/uL Final    RBC 06/13/2019 4.03  3.50 - 5.50 M/uL Final    Hemoglobin 06/13/2019 13.0  12.0 - 15.0 g/dL Final    Hematocrit 06/13/2019 38.2  36.0 - 48.0 % Final    Mean Corpuscular Volume 06/13/2019 94.8  79.0 - 98.0 fL Final    Mean Corpuscular Hemoglobin 06/13/2019 32.3  25.0 - 35.0 pg Final    Mean Corpuscular Hemoglobin Conc 06/13/2019 34.0  31.0 - 36.0 g/dL Final    RDW 06/13/2019 13.2  11.7 - 14.9 % Final    Platelets 06/13/2019 307  140 - 440 K/uL Final    MPV 06/13/2019 11.2  8.8 - 12.7 fL Final    Gran% 06/13/2019 56.8  % Final    Lymph% 06/13/2019 23.1  % Final    Mono% 06/13/2019 12.2  % Final    Eosinophil% 06/13/2019 7.0  % Final    Basophil% 06/13/2019 0.5  % Final    Gran # (ANC) 06/13/2019 4.7  1.4 - 6.5 K/uL Final    Lymph # 06/13/2019 1.9  1.2 - 3.4 K/uL  Final    Mono # 06/13/2019 1.0* 0.1 - 0.6 K/uL Final    Eos # 06/13/2019 0.6  0.0 - 0.7 K/uL Final    Baso # 06/13/2019 0.0  0.0 - 0.2 K/uL Final    Immature Grans (Abs) 06/13/2019 0.0  0.0 - 1.0 K/uL Final    Immature Granulocytes 06/13/2019 0.4  % Final    nRBC% 06/13/2019 0  % Final   Office Visit on 06/06/2019   Component Date Value Ref Range Status    Color, UA 06/06/2019 yellow   Final    Spec Grav UA 06/06/2019 1.005   Final    pH, UA 06/06/2019 7   Final    WBC, UA 06/06/2019 neg   Final    Nitrite, UA 06/06/2019 neg   Final    Protein 06/06/2019 neg   Final    Glucose, UA 06/06/2019 neg   Final    Ketones, UA 06/06/2019 neg   Final    Urobilinogen, UA 06/06/2019 neg   Final    Bilirubin 06/06/2019 neg   Final    Blood, UA 06/06/2019 neg   Final    Urine Culture, Routine 06/06/2019 No growth   Final    POC Residual Urine Volume 06/06/2019 105* 0 - 100 mL Final       Past Medical History:   Diagnosis Date    Back pain     Cystitis, interstitial     Hypertension     Uncontrolled type 2 diabetes mellitus without complication, without long-term current use of insulin 1/22/2018    Wears glasses     Wears partial dentures     upper     Past Surgical History:   Procedure Laterality Date    CHOLECYSTECTOMY  02/21/2018    CYSTOSCOPY      D & C      DILATION AND CURETTAGE OF UTERUS      disk repair      L5 S1    EYE SURGERY      bilat cataract     Family History   Problem Relation Age of Onset    Cancer Father     Cancer Sister     Cancer Brother     Cancer Brother        Marital Status:   Alcohol History:  reports that she drinks alcohol.  Tobacco History:  reports that she quit smoking about 26 years ago. Her smoking use included cigarettes. She has a 32.00 pack-year smoking history. She has never used smokeless tobacco.  Drug History:  reports that she does not use drugs.    Review of patient's allergies indicates:  No Known Allergies    Current Outpatient Medications:      alendronate (FOSAMAX) 70 MG tablet, Take 1 tablet (70 mg total) by mouth every 7 days., Disp: 12 tablet, Rfl: 1    amitriptyline (ELAVIL) 25 MG tablet, Take 1 tablet (25 mg total) by mouth 3 (three) times daily., Disp: 270 tablet, Rfl: 1    cetirizine (ZYRTEC) 10 MG tablet, Take 10 mg by mouth once daily., Disp: , Rfl:     ergocalciferol (ERGOCALCIFEROL) 50,000 unit Cap, Take 1 capsule by mouth once a week., Disp: , Rfl:     ESTRING 2 mg (7.5 mcg /24 hour) vaginal ring, INSERT 1 RING VAGINALLY EVERY 3 MONTHS, Disp: 1 each, Rfl: 4    ezetimibe (ZETIA) 10 mg tablet, Take 1 tablet (10 mg total) by mouth every evening., Disp: 90 tablet, Rfl: 1    glucosamine-chondroitin 500-400 mg tablet, Take 1 tablet by mouth once daily., Disp: , Rfl:     hydroCHLOROthiazide (HYDRODIURIL) 25 MG tablet, Take 1 tablet (25 mg total) by mouth 2 (two) times daily., Disp: 180 tablet, Rfl: 1    hyoscyamine (OSCIMIN SL) 0.125 mg Subl, DISSOLVE 1 TABLET UNDER THE TONGUE THREE TIMES A DAY AS NEEDED, Disp: 270 tablet, Rfl: 1    pantoprazole (PROTONIX) 40 MG tablet, Take 1 tablet (40 mg total) by mouth once daily., Disp: 90 tablet, Rfl: 1    pentosan polysulfate (ELMIRON) 100 mg Cap, Take 1 capsule (100 mg total) by mouth 3 (three) times daily., Disp: 270 capsule, Rfl: 1    valsartan (DIOVAN) 160 MG tablet, Take 1 tablet (160 mg total) by mouth once daily., Disp: 90 tablet, Rfl: 1    nystatin-triamcinolone (MYCOLOG II) cream, Apply topically 4 (four) times daily. for 10 days, Disp: 60 g, Rfl: 1    Review of Systems   Constitutional: Negative for chills, fatigue, fever and unexpected weight change.   HENT: Negative for congestion, ear pain, hearing loss, sinus pain and sore throat.    Eyes: Negative for pain and visual disturbance.   Respiratory: Negative for cough, shortness of breath and wheezing.    Cardiovascular: Negative for chest pain, palpitations and leg swelling.   Gastrointestinal: Negative for abdominal pain, blood in  "stool, constipation (HPI), diarrhea, nausea and vomiting.   Endocrine: Negative for cold intolerance and heat intolerance.   Genitourinary: Negative for difficulty urinating, dysuria, frequency, pelvic pain and urgency.        HPI   Musculoskeletal: Negative for back pain (HPI SPO now doing better but has back discomfort and spasm), joint swelling and neck pain.   Skin: Negative for pallor and rash.   Neurological: Negative for dizziness, tremors, weakness, numbness and headaches.   Hematological: Does not bruise/bleed easily.   Psychiatric/Behavioral: Negative for agitation, sleep disturbance and suicidal ideas.          Objective:      Vitals:    10/17/19 1543   BP: 138/62   Pulse: 82   Resp: 18   Temp: 98.4 °F (36.9 °C)   SpO2: 98%   Weight: 109.3 kg (241 lb)   Height: 5' 3" (1.6 m)     Physical Exam   Constitutional: She is oriented to person, place, and time. She appears well-developed. She is cooperative.   Increased BMI   HENT:   Right Ear: Tympanic membrane normal.   Left Ear: Tympanic membrane normal.   Eyes: Conjunctivae, EOM and lids are normal. Right pupil is round and reactive. Left pupil is round and reactive.   Neck: Trachea normal and normal range of motion. Neck supple. No JVD present. Carotid bruit is not present. No thyromegaly present.   Cardiovascular: Normal rate, regular rhythm, S1 normal, S2 normal, normal heart sounds and intact distal pulses. Exam reveals no gallop and no friction rub.   No murmur heard.  Pulmonary/Chest: Breath sounds normal. No respiratory distress. She has no wheezes. She has no rales.   Abdominal: Soft. Bowel sounds are normal. She exhibits no mass. There is no tenderness. There is no rigidity and no guarding.   Musculoskeletal: She exhibits no edema.   Neurological: She is alert and oriented to person, place, and time.   Skin: Skin is warm and dry. Capillary refill takes less than 2 seconds. No lesion and no rash noted. Nails show no clubbing.   Psychiatric: She has a " normal mood and affect. Her behavior is normal. Judgment and thought content normal.   Nursing note and vitals reviewed.        Assessment:       1. Localized edema    2. IFG (impaired fasting glucose)    3. Encounter for monitoring diuretic therapy    4. BMI 40.0-44.9, adult         Plan:       IFG (impaired fasting glucose)  -     Hemoglobin A1c; Future; Expected date: 12/17/2019    Localized edema        -    stable    Encounter for monitoring diuretic therapy  -     Comprehensive metabolic panel; Future; Expected date: 12/17/2019    BMI 40.0-44.9, adult      Follow up in about 3 months (around 1/17/2020) for edema, FOLLOW UP LABS, FOLLOW-UP STATUS, FOLLOW UP MEDICATIONS.        10/20/2019 Elda Paredes M.D.

## 2019-10-17 NOTE — PROGRESS NOTES
Subjective:       Patient ID: Fransisca Frazier is a 74 y.o. female.    Chief Complaint: 1 month f/u voiding difficulty      Fransisca Frazier is a 74 y.o. female who presents today for follow up in regards to her voiding difficulty.  She had back surgery in June and ended up developing a hematoma.  She was either in the hospital or rehab facility for August and part of Sep.  She came to our office in sep. For routine pessary maintenance.  At that time she was having difficulty voiding.  She would urinate, but still have feeling of PVF.  She stated that it was worse when the hematoma first developed and had been improving slowly since then.  At the last office visit her PVR was 106.  As she sits in the office today, she is feeling much better.  Her back is somewhat irritated today after doing some physical therapy.  However, her bladder is improved.  She denies any feeling of PVF.  She has frequency x q 1.5-2 hours during the day and nocturia x 0-1.  She denies any incontinence although she has been wearing pads for piece of mind.  She overall feels greatly improved.      Review of Systems   Constitutional: Negative for activity change, fever and unexpected weight change.   HENT: Negative for hearing loss.    Eyes: Negative for visual disturbance.   Respiratory: Negative for shortness of breath and wheezing.    Cardiovascular: Negative for chest pain, palpitations and leg swelling.   Gastrointestinal: Negative for abdominal pain, constipation and diarrhea.   Genitourinary: Positive for frequency. Negative for dyspareunia, dysuria, urgency, vaginal bleeding and vaginal discharge.   Musculoskeletal: Negative for gait problem and neck pain.   Skin: Negative for rash and wound.   Allergic/Immunologic: Negative for immunocompromised state.   Neurological: Negative for tremors, speech difficulty and weakness.   Hematological: Does not bruise/bleed easily.   Psychiatric/Behavioral: Negative for agitation and confusion.        Objective:      Physical Exam   Constitutional: She is oriented to person, place, and time. She appears well-developed and well-nourished. No distress.   HENT:   Head: Normocephalic and atraumatic.   Neck: Neck supple. No thyromegaly present.   Pulmonary/Chest: Effort normal. No respiratory distress.   Abdominal: Soft. There is no tenderness. No hernia.   Musculoskeletal: Normal range of motion.   Neurological: She is alert and oriented to person, place, and time.   Skin: Skin is warm and dry. No rash noted.   Psychiatric: She has a normal mood and affect. Her behavior is normal.         Assessment:       1. Urinary frequency    2. Voiding dysfunction    3. Cystocele, midline    4. Atrophic vaginitis        Plan:       Urinary frequency- monitor    Voiding dysfunction- improved, monitor.  Bladder scan today revealed 51 mls.  -     POCT Bladder Scan    Cystocele, midline- continue with estring    Atrophic vaginitis- as noted above    RTC 2 months for estring change.

## 2019-10-25 ENCOUNTER — LAB VISIT (OUTPATIENT)
Dept: LAB | Facility: HOSPITAL | Age: 74
End: 2019-10-25
Attending: INTERNAL MEDICINE
Payer: MEDICARE

## 2019-10-25 DIAGNOSIS — R60.9 EDEMA: ICD-10-CM

## 2019-10-25 DIAGNOSIS — E87.6 HYPOPOTASSEMIA: ICD-10-CM

## 2019-10-25 DIAGNOSIS — N18.30 CHRONIC KIDNEY DISEASE, STAGE III (MODERATE): ICD-10-CM

## 2019-10-25 DIAGNOSIS — R94.4 NONSPECIFIC ABNORMAL RESULTS OF KIDNEY FUNCTION STUDY: Primary | ICD-10-CM

## 2019-10-25 DIAGNOSIS — R94.4 NONSPECIFIC ABNORMAL RESULTS OF KIDNEY FUNCTION STUDY: ICD-10-CM

## 2019-10-25 DIAGNOSIS — E55.9 AVITAMINOSIS D: ICD-10-CM

## 2019-10-25 LAB
ALBUMIN SERPL BCP-MCNC: 4.1 G/DL (ref 3.5–5.2)
ALP SERPL-CCNC: 45 U/L (ref 55–135)
ALT SERPL W/O P-5'-P-CCNC: 22 U/L (ref 10–44)
ANION GAP SERPL CALC-SCNC: 7 MMOL/L (ref 8–16)
AST SERPL-CCNC: 27 U/L (ref 10–40)
BASOPHILS # BLD AUTO: 0.03 K/UL (ref 0–0.2)
BASOPHILS NFR BLD: 0.5 % (ref 0–1.9)
BILIRUB SERPL-MCNC: 0.8 MG/DL (ref 0.1–1)
BUN SERPL-MCNC: 15 MG/DL (ref 8–23)
CALCIUM SERPL-MCNC: 9.6 MG/DL (ref 8.7–10.5)
CHLORIDE SERPL-SCNC: 91 MMOL/L (ref 95–110)
CO2 SERPL-SCNC: 30 MMOL/L (ref 23–29)
CREAT SERPL-MCNC: 1.1 MG/DL (ref 0.5–1.4)
CREAT UR-MCNC: 55 MG/DL (ref 15–325)
DIFFERENTIAL METHOD: ABNORMAL
EOSINOPHIL # BLD AUTO: 0.3 K/UL (ref 0–0.5)
EOSINOPHIL NFR BLD: 4.7 % (ref 0–8)
ERYTHROCYTE [DISTWIDTH] IN BLOOD BY AUTOMATED COUNT: 13.3 % (ref 11.5–14.5)
EST. GFR  (AFRICAN AMERICAN): 56.4 ML/MIN/1.73 M^2
EST. GFR  (NON AFRICAN AMERICAN): 48.9 ML/MIN/1.73 M^2
GLUCOSE SERPL-MCNC: 104 MG/DL (ref 70–110)
HCT VFR BLD AUTO: 39 % (ref 37–48.5)
HGB BLD-MCNC: 13.1 G/DL (ref 12–16)
IMM GRANULOCYTES # BLD AUTO: 0.02 K/UL (ref 0–0.04)
IMM GRANULOCYTES NFR BLD AUTO: 0.4 % (ref 0–0.5)
LYMPHOCYTES # BLD AUTO: 1.1 K/UL (ref 1–4.8)
LYMPHOCYTES NFR BLD: 19.9 % (ref 18–48)
MCH RBC QN AUTO: 32 PG (ref 27–31)
MCHC RBC AUTO-ENTMCNC: 33.6 G/DL (ref 32–36)
MCV RBC AUTO: 95 FL (ref 82–98)
MONOCYTES # BLD AUTO: 0.9 K/UL (ref 0.3–1)
MONOCYTES NFR BLD: 15.9 % (ref 4–15)
NEUTROPHILS # BLD AUTO: 3.2 K/UL (ref 1.8–7.7)
NEUTROPHILS NFR BLD: 58.6 % (ref 38–73)
NRBC BLD-RTO: 0 /100 WBC
PHOSPHATE SERPL-MCNC: 3.1 MG/DL (ref 2.7–4.5)
PLATELET # BLD AUTO: 352 K/UL (ref 150–350)
PMV BLD AUTO: 10.7 FL (ref 9.2–12.9)
POTASSIUM SERPL-SCNC: 3.7 MMOL/L (ref 3.5–5.1)
PROT SERPL-MCNC: 7.7 G/DL (ref 6–8.4)
PROT UR-MCNC: <6 MG/DL (ref 0–15)
PROT/CREAT UR: NORMAL MG/G{CREAT} (ref 0–0.2)
RBC # BLD AUTO: 4.1 M/UL (ref 4–5.4)
SODIUM SERPL-SCNC: 128 MMOL/L (ref 136–145)
WBC # BLD AUTO: 5.53 K/UL (ref 3.9–12.7)

## 2019-10-25 PROCEDURE — 84156 ASSAY OF PROTEIN URINE: CPT

## 2019-10-25 PROCEDURE — 80053 COMPREHEN METABOLIC PANEL: CPT

## 2019-10-25 PROCEDURE — 36415 COLL VENOUS BLD VENIPUNCTURE: CPT

## 2019-10-25 PROCEDURE — 84100 ASSAY OF PHOSPHORUS: CPT

## 2019-10-25 PROCEDURE — 85025 COMPLETE CBC W/AUTO DIFF WBC: CPT

## 2019-11-01 DIAGNOSIS — M85.851 OSTEOPENIA OF BOTH HIPS: ICD-10-CM

## 2019-11-01 DIAGNOSIS — M85.852 OSTEOPENIA OF BOTH HIPS: ICD-10-CM

## 2019-11-01 DIAGNOSIS — K21.9 GERD WITHOUT ESOPHAGITIS: ICD-10-CM

## 2019-11-03 RX ORDER — ALENDRONATE SODIUM 70 MG/1
TABLET ORAL
Qty: 12 TABLET | Refills: 4 | Status: SHIPPED | OUTPATIENT
Start: 2019-11-03 | End: 2020-01-29 | Stop reason: SDUPTHER

## 2019-11-03 RX ORDER — SUCRALFATE 1 G/1
TABLET ORAL
Qty: 120 TABLET | Refills: 12 | Status: SHIPPED | OUTPATIENT
Start: 2019-11-03 | End: 2020-01-29

## 2019-12-19 ENCOUNTER — OFFICE VISIT (OUTPATIENT)
Dept: UROGYNECOLOGY | Facility: CLINIC | Age: 74
End: 2019-12-19
Payer: MEDICARE

## 2019-12-19 VITALS
BODY MASS INDEX: 43.79 KG/M2 | HEART RATE: 102 BPM | SYSTOLIC BLOOD PRESSURE: 162 MMHG | DIASTOLIC BLOOD PRESSURE: 74 MMHG | WEIGHT: 247.13 LBS | HEIGHT: 63 IN

## 2019-12-19 DIAGNOSIS — R31.21 ASYMPTOMATIC MICROSCOPIC HEMATURIA: ICD-10-CM

## 2019-12-19 DIAGNOSIS — N39.8 VOIDING DYSFUNCTION: ICD-10-CM

## 2019-12-19 DIAGNOSIS — N95.2 ATROPHIC VAGINITIS: ICD-10-CM

## 2019-12-19 DIAGNOSIS — N81.11 CYSTOCELE, MIDLINE: ICD-10-CM

## 2019-12-19 DIAGNOSIS — R35.0 URINARY FREQUENCY: Primary | ICD-10-CM

## 2019-12-19 DIAGNOSIS — Z46.89 PESSARY MAINTENANCE: ICD-10-CM

## 2019-12-19 LAB
BILIRUB SERPL-MCNC: ABNORMAL MG/DL
BLOOD URINE, POC: 250
COLOR, POC UA: YELLOW
GLUCOSE UR QL STRIP: ABNORMAL
KETONES UR QL STRIP: ABNORMAL
LEUKOCYTE ESTERASE URINE, POC: ABNORMAL
NITRITE, POC UA: ABNORMAL
PH, POC UA: 7
PROTEIN, POC: ABNORMAL
SPECIFIC GRAVITY, POC UA: 1
UROBILINOGEN, POC UA: ABNORMAL

## 2019-12-19 PROCEDURE — 99213 OFFICE O/P EST LOW 20 MIN: CPT | Mod: S$PBB,,, | Performed by: NURSE PRACTITIONER

## 2019-12-19 PROCEDURE — 99214 OFFICE O/P EST MOD 30 MIN: CPT | Mod: PBBFAC,PO | Performed by: NURSE PRACTITIONER

## 2019-12-19 PROCEDURE — 87481 CANDIDA DNA AMP PROBE: CPT | Mod: 59

## 2019-12-19 PROCEDURE — 99999 PR PBB SHADOW E&M-EST. PATIENT-LVL IV: ICD-10-PCS | Mod: PBBFAC,,, | Performed by: NURSE PRACTITIONER

## 2019-12-19 PROCEDURE — 1159F PR MEDICATION LIST DOCUMENTED IN MEDICAL RECORD: ICD-10-PCS | Mod: ,,, | Performed by: NURSE PRACTITIONER

## 2019-12-19 PROCEDURE — 99213 PR OFFICE/OUTPT VISIT, EST, LEVL III, 20-29 MIN: ICD-10-PCS | Mod: S$PBB,,, | Performed by: NURSE PRACTITIONER

## 2019-12-19 PROCEDURE — 81002 URINALYSIS NONAUTO W/O SCOPE: CPT | Mod: PBBFAC,PO | Performed by: NURSE PRACTITIONER

## 2019-12-19 PROCEDURE — 99999 PR PBB SHADOW E&M-EST. PATIENT-LVL IV: CPT | Mod: PBBFAC,,, | Performed by: NURSE PRACTITIONER

## 2019-12-19 PROCEDURE — 1126F AMNT PAIN NOTED NONE PRSNT: CPT | Mod: ,,, | Performed by: NURSE PRACTITIONER

## 2019-12-19 PROCEDURE — 87801 DETECT AGNT MULT DNA AMPLI: CPT

## 2019-12-19 PROCEDURE — 1159F MED LIST DOCD IN RCRD: CPT | Mod: ,,, | Performed by: NURSE PRACTITIONER

## 2019-12-19 PROCEDURE — 1126F PR PAIN SEVERITY QUANTIFIED, NO PAIN PRESENT: ICD-10-PCS | Mod: ,,, | Performed by: NURSE PRACTITIONER

## 2019-12-19 RX ORDER — DOXYCYCLINE 100 MG/1
CAPSULE ORAL
COMMUNITY
Start: 2019-12-04 | End: 2020-01-29

## 2019-12-19 RX ORDER — LANOLIN ALCOHOL/MO/W.PET/CERES
CREAM (GRAM) TOPICAL
COMMUNITY
End: 2020-01-29

## 2019-12-19 RX ORDER — SODIUM SULFACETAMIDE AND SULFUR 100; 50 MG/G; MG/G
CREAM TOPICAL
COMMUNITY
Start: 2019-12-05 | End: 2020-01-29

## 2019-12-19 RX ORDER — VALSARTAN 160 MG/1
TABLET ORAL
COMMUNITY
End: 2020-01-29 | Stop reason: SDUPTHER

## 2019-12-19 RX ORDER — POLYETHYLENE GLYCOL 3350 17 G/17G
POWDER, FOR SOLUTION ORAL
COMMUNITY
End: 2020-01-29

## 2019-12-19 RX ORDER — TACROLIMUS 1 MG/G
OINTMENT TOPICAL
COMMUNITY
Start: 2019-12-10 | End: 2020-01-29

## 2019-12-19 RX ORDER — TRAMADOL HYDROCHLORIDE 50 MG/1
TABLET ORAL
COMMUNITY
End: 2020-01-29

## 2019-12-19 RX ORDER — PREDNISONE 20 MG/1
TABLET ORAL
COMMUNITY
Start: 2019-12-10 | End: 2020-01-29

## 2019-12-19 NOTE — PROGRESS NOTES
Subjective:       Patient ID: Fransisca Frazier is a 74 y.o. female.    Chief Complaint: estring    HPI  Fransisca Frazier is a 74 y.o. female who presents today for routine pessary maintenance.  She has been using the estring for quite awhile and feels that it works well for her.  She denies any vaginal discharge, bleeding or bulging.  She has had some irritation near the urethra and is not sure if she might have a yeast infection or something.  She has been taking daily antibiotics for her rosacea, although she stopped this about 4 days ago.  She denies any real urinary complaints/concerns.  She denies any real dysuria or increase in her frequency or urgency.  She denies any odor to her urine.  She feels like she is voiding fine at this time.   She does have some microscopic blood in her urine today.  Her last cytology was 12/06/18 and her last cysto was 08/24/18 with Dr. Patel.   She denies any other acute complaints/concerns at this time and is ready to proceed with the pessary.    Review of Systems   Constitutional: Negative for activity change, fever and unexpected weight change.   HENT: Negative for hearing loss.    Eyes: Negative for visual disturbance.   Respiratory: Negative for shortness of breath and wheezing.    Cardiovascular: Negative for chest pain, palpitations and leg swelling.   Gastrointestinal: Negative for abdominal pain, constipation and diarrhea.   Genitourinary: Positive for frequency. Negative for dyspareunia, dysuria, urgency, vaginal bleeding and vaginal discharge.        Mild vaginal irritation   Musculoskeletal: Negative for gait problem and neck pain.   Skin: Negative for rash and wound.   Allergic/Immunologic: Negative for immunocompromised state.   Neurological: Negative for tremors, speech difficulty and weakness.   Hematological: Does not bruise/bleed easily.   Psychiatric/Behavioral: Negative for agitation and confusion.       Objective:      Physical Exam   Constitutional: She  is oriented to person, place, and time. She appears well-developed and well-nourished. No distress.   HENT:   Head: Normocephalic and atraumatic.   Neck: Neck supple. No thyromegaly present.   Pulmonary/Chest: Effort normal. No respiratory distress.   Abdominal: Soft. There is no tenderness. No hernia.   Musculoskeletal: Normal range of motion.   Neurological: She is alert and oriented to person, place, and time.   Skin: Skin is warm and dry. No rash noted.   Psychiatric: She has a normal mood and affect. Her behavior is normal.     Pelvic Exam:  V: No lesions. No palpable nodes.   Va: no discharge or bleeding.  Good length.  Midline cystocele Ba=-1  Meatus:No caruncle or stenosis  Urethra: Non tender. No suburethral masses.  Cx/Cuff: Normal   Uterus: small, non-tender  Ad: No mass or tenderness.  Levators :Symmetrical. Normal tone. Non tender.  BL: Non tender  RV: No hemorrhoids.      Assessment:       1. Urinary frequency    2. Voiding dysfunction    3. Cystocele, midline    4. Pessary maintenance    5. Atrophic vaginitis    6. Asymptomatic microscopic hematuria          Procedure note- estring pessary removed and discarded.  After betadine irrigation of the vagina, a new estring pessary, which the pt provided,  was reinserted into the vagina.  Pt ambulated in the room and denies any discomfort.  NP reminded pt that the first 24-48 hours are the most likely time for the pessary to fall out and to monitor the toilet before flushing.  Pt verbalized understanding.      Plan:       Urinary frequency-monitor  -     POCT urine dipstick without microscope    Voiding dysfunction- improved, monitor    Cystocele, midline- continue with estring    Pessary maintenance- as noted above    Atrophic vaginitis- continue with estring  -     Vaginosis Screen by DNA Probe    Asymptomatic microscopic hematuria- if repeat UA in 3 months shows blood we will obtain a cath specimen to send for cytology.    RTC 3 months for estring.

## 2019-12-20 LAB
BACTERIAL VAGINOSIS DNA: NEGATIVE
CANDIDA GLABRATA DNA: NEGATIVE
CANDIDA KRUSEI DNA: NEGATIVE
CANDIDA RRNA VAG QL PROBE: POSITIVE
T VAGINALIS RRNA GENITAL QL PROBE: NEGATIVE

## 2019-12-21 LAB
ALBUMIN SERPL-MCNC: 4 G/DL (ref 3.6–5.1)
ALBUMIN/GLOB SERPL: 1.4 (CALC) (ref 1–2.5)
ALP SERPL-CCNC: 47 U/L (ref 33–130)
ALT SERPL-CCNC: 15 U/L (ref 6–29)
AST SERPL-CCNC: 20 U/L (ref 10–35)
BILIRUB SERPL-MCNC: 0.6 MG/DL (ref 0.2–1.2)
BUN SERPL-MCNC: 15 MG/DL (ref 7–25)
BUN/CREAT SERPL: 14 (CALC) (ref 6–22)
CALCIUM SERPL-MCNC: 9.7 MG/DL (ref 8.6–10.4)
CHLORIDE SERPL-SCNC: 96 MMOL/L (ref 98–110)
CO2 SERPL-SCNC: 29 MMOL/L (ref 20–32)
CREAT SERPL-MCNC: 1.07 MG/DL (ref 0.6–0.93)
GFRSERPLBLD MDRD-ARVRAT: 50 ML/MIN/1.73M2
GLOBULIN SER CALC-MCNC: 2.9 G/DL (CALC) (ref 1.9–3.7)
GLUCOSE SERPL-MCNC: 108 MG/DL (ref 65–99)
HBA1C MFR BLD: 5.8 % OF TOTAL HGB
POTASSIUM SERPL-SCNC: 4.1 MMOL/L (ref 3.5–5.3)
PROT SERPL-MCNC: 6.9 G/DL (ref 6.1–8.1)
SODIUM SERPL-SCNC: 135 MMOL/L (ref 135–146)

## 2019-12-24 RX ORDER — FLUCONAZOLE 150 MG/1
150 TABLET ORAL DAILY
Qty: 2 TABLET | Refills: 1 | Status: SHIPPED | OUTPATIENT
Start: 2019-12-24 | End: 2019-12-25

## 2020-01-26 NOTE — PROGRESS NOTES
"  SUBJECTIVE:    Patient ID: Fransisca Frazier is a 74 y.o. female.    Chief Complaint: Follow-up (labs) and Edema (f/u)    HPI     If for follow-up of edema.-no longer exists--    Last 3 sets of Vitals    Vitals - 1 value per visit 10/17/2019 12/19/2019 1/29/2020   SYSTOLIC 129 162 122   DIASTOLIC 68 74 64   PULSE 90 102 72   TEMPERATURE - - -   RESPIRATIONS - - -   SPO2 - - 97   Weight (lb) 241.62 247.14 248.5   Weight (kg) 109.6 112.1 112.719   HEIGHT 5' 3" 5' 3" 5' 3"   BODY MASS INDEX 42.8 43.78 44.02   VISIT REPORT - - -   Pain Score  2 0 0       Laboratory studies revealed glucose of 108, BUN of 15, creatinine 1.07, and GFR of 50 cc.Sodium 128-  A1c was 5.8, complete metabolic panel normal.  Urinalysis was positive for Candida-it was treated.    Latest laboratory studies include glucose of 108, GFR 50, A1C 5.8    Orders Only on 12/20/2019   Component Date Value Ref Range Status    Glucose 12/20/2019 108* 65 - 99 mg/dL Final    BUN, Bld 12/20/2019 15  7 - 25 mg/dL Final    Creatinine 12/20/2019 1.07* 0.60 - 0.93 mg/dL Final    eGFR if non African American 12/20/2019 50* > OR = 60 mL/min/1.73m2 Final    eGFR if  12/20/2019 58* > OR = 60 mL/min/1.73m2 Final    BUN/Creatinine Ratio 12/20/2019 14  6 - 22 (calc) Final    Sodium 12/20/2019 135  135 - 146 mmol/L Final    Potassium 12/20/2019 4.1  3.5 - 5.3 mmol/L Final    Chloride 12/20/2019 96* 98 - 110 mmol/L Final    CO2 12/20/2019 29  20 - 32 mmol/L Final    Calcium 12/20/2019 9.7  8.6 - 10.4 mg/dL Final    Total Protein 12/20/2019 6.9  6.1 - 8.1 g/dL Final    Albumin 12/20/2019 4.0  3.6 - 5.1 g/dL Final    Globulin, Total 12/20/2019 2.9  1.9 - 3.7 g/dL (calc) Final    Albumin/Globulin Ratio 12/20/2019 1.4  1.0 - 2.5 (calc) Final    Total Bilirubin 12/20/2019 0.6  0.2 - 1.2 mg/dL Final    Alkaline Phosphatase 12/20/2019 47  33 - 130 U/L Final    AST 12/20/2019 20  10 - 35 U/L Final    ALT 12/20/2019 15  6 - 29 U/L Final    " Hemoglobin A1C 12/20/2019 5.8* <5.7 % of total Hgb Final   Office Visit on 12/19/2019   Component Date Value Ref Range Status    Color, UA 12/19/2019 yellow   Final    Spec Grav UA 12/19/2019 1.000   Final    pH, UA 12/19/2019 7   Final    WBC, UA 12/19/2019 neg   Final    Nitrite, UA 12/19/2019 neg   Final    Protein 12/19/2019 neg   Final    Glucose, UA 12/19/2019 norm   Final    Ketones, UA 12/19/2019 neg   Final    Urobilinogen, UA 12/19/2019 norm   Final    Bilirubin 12/19/2019 neg   Final    Blood, UA 12/19/2019 250   Final    Trichomonas vaginalis 12/19/2019 Negative  Negative Final    Candida sp 12/19/2019 Positive* Negative Final    Candida glabrata DNA 12/19/2019 Negative  Negative Final    Kayla krusei DNA 12/19/2019 Negative  Negative Final    Bacterial vaginosis DNA 12/19/2019 Negative  Negative Final   Lab Visit on 10/25/2019   Component Date Value Ref Range Status    Sodium 10/25/2019 128* 136 - 145 mmol/L Final    Potassium 10/25/2019 3.7  3.5 - 5.1 mmol/L Final    Chloride 10/25/2019 91* 95 - 110 mmol/L Final    CO2 10/25/2019 30* 23 - 29 mmol/L Final    Glucose 10/25/2019 104  70 - 110 mg/dL Final    BUN, Bld 10/25/2019 15  8 - 23 mg/dL Final    Creatinine 10/25/2019 1.1  0.5 - 1.4 mg/dL Final    Calcium 10/25/2019 9.6  8.7 - 10.5 mg/dL Final    Total Protein 10/25/2019 7.7  6.0 - 8.4 g/dL Final    Albumin 10/25/2019 4.1  3.5 - 5.2 g/dL Final    Total Bilirubin 10/25/2019 0.8  0.1 - 1.0 mg/dL Final    Alkaline Phosphatase 10/25/2019 45* 55 - 135 U/L Final    AST 10/25/2019 27  10 - 40 U/L Final    ALT 10/25/2019 22  10 - 44 U/L Final    Anion Gap 10/25/2019 7* 8 - 16 mmol/L Final    eGFR if  10/25/2019 56.4* >60 mL/min/1.73 m^2 Final    eGFR if non African American 10/25/2019 48.9* >60 mL/min/1.73 m^2 Final    WBC 10/25/2019 5.53  3.90 - 12.70 K/uL Final    RBC 10/25/2019 4.10  4.00 - 5.40 M/uL Final    Hemoglobin 10/25/2019 13.1  12.0 - 16.0  g/dL Final    Hematocrit 10/25/2019 39.0  37.0 - 48.5 % Final    Mean Corpuscular Volume 10/25/2019 95  82 - 98 fL Final    Mean Corpuscular Hemoglobin 10/25/2019 32.0* 27.0 - 31.0 pg Final    Mean Corpuscular Hemoglobin Conc 10/25/2019 33.6  32.0 - 36.0 g/dL Final    RDW 10/25/2019 13.3  11.5 - 14.5 % Final    Platelets 10/25/2019 352* 150 - 350 K/uL Final    MPV 10/25/2019 10.7  9.2 - 12.9 fL Final    Immature Granulocytes 10/25/2019 0.4  0.0 - 0.5 % Final    Gran # (ANC) 10/25/2019 3.2  1.8 - 7.7 K/uL Final    Immature Grans (Abs) 10/25/2019 0.02  0.00 - 0.04 K/uL Final    Lymph # 10/25/2019 1.1  1.0 - 4.8 K/uL Final    Mono # 10/25/2019 0.9  0.3 - 1.0 K/uL Final    Eos # 10/25/2019 0.3  0.0 - 0.5 K/uL Final    Baso # 10/25/2019 0.03  0.00 - 0.20 K/uL Final    nRBC 10/25/2019 0  0 /100 WBC Final    Gran% 10/25/2019 58.6  38.0 - 73.0 % Final    Lymph% 10/25/2019 19.9  18.0 - 48.0 % Final    Mono% 10/25/2019 15.9* 4.0 - 15.0 % Final    Eosinophil% 10/25/2019 4.7  0.0 - 8.0 % Final    Basophil% 10/25/2019 0.5  0.0 - 1.9 % Final    Differential Method 10/25/2019 Automated   Final    Phosphorus 10/25/2019 3.1  2.7 - 4.5 mg/dL Final    Protein, Urine Random 10/25/2019 <6  0 - 15 mg/dL Final    Creatinine, Random Ur 10/25/2019 55.0  15.0 - 325.0 mg/dL Final    Prot/Creat Ratio, Ur 10/25/2019 Unable to calculate  0.00 - 0.20 Final   Office Visit on 10/17/2019   Component Date Value Ref Range Status    POC Residual Urine Volume 10/17/2019 51  0 - 100 mL Final    Color, UA 10/17/2019 yellow   Final    Spec Grav UA 10/17/2019 1.000   Final    pH, UA 10/17/2019 7   Final    WBC, UA 10/17/2019 neg   Final    Nitrite, UA 10/17/2019 neg   Final    Protein 10/17/2019 neg   Final    Glucose, UA 10/17/2019 neg   Final    Ketones, UA 10/17/2019 neg   Final    Urobilinogen, UA 10/17/2019 neg   Final    Bilirubin 10/17/2019 neg   Final    Blood, UA 10/17/2019 neg   Final   Office Visit on  09/19/2019   Component Date Value Ref Range Status    Color, UA 09/19/2019 yellow   Final    Spec Grav UA 09/19/2019 1,010   Final    pH, UA 09/19/2019 7   Final    WBC, UA 09/19/2019 neg   Final    Nitrite, UA 09/19/2019 neg   Final    Protein 09/19/2019 trace   Final    Glucose, UA 09/19/2019 neg   Final    Ketones, UA 09/19/2019 neg   Final    Urobilinogen, UA 09/19/2019 neg   Final    Bilirubin 09/19/2019 neg   Final    Blood, UA 09/19/2019 neg   Final    POC Residual Urine Volume 09/19/2019 106* 0 - 100 mL Final       Past Medical History:   Diagnosis Date    Back pain     Cystitis, interstitial     Hypertension     Uncontrolled type 2 diabetes mellitus without complication, without long-term current use of insulin 1/22/2018    Wears glasses     Wears partial dentures     upper     Past Surgical History:   Procedure Laterality Date    CHOLECYSTECTOMY  02/21/2018    CYSTOSCOPY      D & C      DILATION AND CURETTAGE OF UTERUS      disk repair      L5 S1    EYE SURGERY      bilat cataract     Family History   Problem Relation Age of Onset    Cancer Father     Cancer Sister     Cancer Brother     Cancer Brother        Marital Status:   Alcohol History:  reports that she drinks alcohol.  Tobacco History:  reports that she quit smoking about 26 years ago. Her smoking use included cigarettes. She has a 32.00 pack-year smoking history. She has never used smokeless tobacco.  Drug History:  reports that she does not use drugs.    Review of patient's allergies indicates:  No Known Allergies    Current Outpatient Medications:     alendronate (FOSAMAX) 70 MG tablet, TAKE 1 TABLET EVERY 7 DAYS, Disp: 12 tablet, Rfl: 4    amitriptyline (ELAVIL) 25 MG tablet, Take 1 tablet (25 mg total) by mouth 3 (three) times daily., Disp: 270 tablet, Rfl: 1    ESTRING 2 mg (7.5 mcg /24 hour) vaginal ring, INSERT 1 RING VAGINALLY EVERY 3 MONTHS, Disp: 1 each, Rfl: 4    ezetimibe (ZETIA) 10 mg tablet,  Take 1 tablet (10 mg total) by mouth every evening., Disp: 90 tablet, Rfl: 1    glucosamine-chondroitin 500-400 mg tablet, Take 1 tablet by mouth once daily., Disp: , Rfl:     hydroCHLOROthiazide (HYDRODIURIL) 25 MG tablet, Take 1 tablet (25 mg total) by mouth 2 (two) times daily., Disp: 180 tablet, Rfl: 1    hydrocortisone 2.5 % ointment, Apply topically once daily., Disp: , Rfl:     hyoscyamine (OSCIMIN SL) 0.125 mg Subl, DISSOLVE 1 TABLET UNDER THE TONGUE THREE TIMES A DAY AS NEEDED, Disp: 270 tablet, Rfl: 1    nystatin-triamcinolone (MYCOLOG II) cream, Apply topically 4 (four) times daily. for 10 days, Disp: 60 g, Rfl: 1    pantoprazole (PROTONIX) 40 MG tablet, Take 1 tablet (40 mg total) by mouth once daily., Disp: 90 tablet, Rfl: 1    pentosan polysulfate (ELMIRON) 100 mg Cap, Take 1 capsule (100 mg total) by mouth 3 (three) times daily., Disp: 270 capsule, Rfl: 1    potassium chloride (KLOR-CON) 10 MEQ TbSR, Take 10 mEq by mouth once daily., Disp: , Rfl:     timolol maleate 0.5% (TIMOPTIC) 0.5 % Drop, Place 1 drop into both eyes 2 (two) times daily., Disp: , Rfl:     valsartan (DIOVAN) 160 MG tablet, Take 1 tablet (160 mg total) by mouth once daily., Disp: 90 tablet, Rfl: 1    Review of Systems   Constitutional: Negative for activity change and unexpected weight change.   HENT: Negative for hearing loss, rhinorrhea and trouble swallowing.    Eyes: Negative for discharge and visual disturbance.   Respiratory: Negative for chest tightness and wheezing.    Cardiovascular: Negative for chest pain and palpitations.   Gastrointestinal: Negative for blood in stool, constipation, diarrhea and vomiting.   Endocrine: Negative for polydipsia and polyuria.   Genitourinary: Positive for hematuria (chronic-IC). Negative for difficulty urinating, dysuria and menstrual problem.   Musculoskeletal: Positive for back pain (cannot ambulate far with her back pain). Negative for arthralgias, joint swelling and neck pain.  "  Neurological: Negative for weakness and headaches.   Psychiatric/Behavioral: Negative for confusion and dysphoric mood.          Objective:      Vitals:    01/29/20 1334   BP: 122/64   Pulse: 72   SpO2: 97%   Weight: 112.7 kg (248 lb 8 oz)   Height: 5' 3" (1.6 m)     Physical Exam   Constitutional: She is oriented to person, place, and time. She appears well-developed. She is cooperative.   Increased BMI   HENT:   Right Ear: Tympanic membrane normal.   Left Ear: Tympanic membrane normal.   Eyes: Conjunctivae, EOM and lids are normal. Right pupil is round and reactive. Left pupil is round and reactive.   Neck: Trachea normal and normal range of motion. Neck supple. No JVD present. Carotid bruit is not present. No thyromegaly present.   Cardiovascular: Normal rate, regular rhythm, S1 normal, S2 normal, normal heart sounds and intact distal pulses. Exam reveals no gallop and no friction rub.   No murmur heard.  Pulmonary/Chest: Breath sounds normal. No respiratory distress. She has no wheezes. She has no rales.   Abdominal: Soft. Bowel sounds are normal. She exhibits no mass. There is no tenderness. There is no rigidity and no guarding.   Neurological: She is alert and oriented to person, place, and time.   Skin: Skin is warm and dry. Capillary refill takes less than 2 seconds. No lesion and no rash noted. Nails show no clubbing.   Psychiatric: She has a normal mood and affect. Her behavior is normal. Judgment and thought content normal.   Nursing note and vitals reviewed.        Assessment:       1. Localized edema    2. Interstitial cystitis    3. Hypertension, unspecified type    4. Pure hypercholesterolemia    5. GERD without esophagitis    6. Osteopenia of both hips    7. BMI 40.0-44.9, adult         Plan:       Localized edema        -     resolved    Interstitial cystitis  -     pentosan polysulfate (ELMIRON) 100 mg Cap; Take 1 capsule (100 mg total) by mouth 3 (three) times daily.  Dispense: 270 capsule; " Refill: 1  -     amitriptyline (ELAVIL) 25 MG tablet; Take 1 tablet (25 mg total) by mouth 3 (three) times daily.  Dispense: 270 tablet; Refill: 1  -     hyoscyamine (OSCIMIN SL) 0.125 mg Subl; DISSOLVE 1 TABLET UNDER THE TONGUE THREE TIMES A DAY AS NEEDED  Dispense: 270 tablet; Refill: 1    Hypertension, unspecified type  -     valsartan (DIOVAN) 160 MG tablet; Take 1 tablet (160 mg total) by mouth once daily.  Dispense: 90 tablet; Refill: 1  -     hydroCHLOROthiazide (HYDRODIURIL) 25 MG tablet; Take 1 tablet (25 mg total) by mouth 2 (two) times daily.  Dispense: 180 tablet; Refill: 1    Pure hypercholesterolemia  -     Lipid panel; Future; Expected date: 06/27/2020  -     ezetimibe (ZETIA) 10 mg tablet; Take 1 tablet (10 mg total) by mouth every evening.  Dispense: 90 tablet; Refill: 1    GERD without esophagitis  -     pantoprazole (PROTONIX) 40 MG tablet; Take 1 tablet (40 mg total) by mouth once daily.  Dispense: 90 tablet; Refill: 1    Osteopenia of both hips  -     alendronate (FOSAMAX) 70 MG tablet; TAKE 1 TABLET EVERY 7 DAYS  Dispense: 12 tablet; Refill: 4    BMI 40.0-44.9, adult      Follow up in about 6 months (around 7/29/2020) for FOLLOW UP LABS, FOLLOW-UP STATUS, FOLLOW UP MEDICATIONS.        1/30/2020 Elda Paredes M.D.

## 2020-01-29 ENCOUNTER — OFFICE VISIT (OUTPATIENT)
Dept: FAMILY MEDICINE | Facility: CLINIC | Age: 75
End: 2020-01-29
Payer: MEDICARE

## 2020-01-29 VITALS
DIASTOLIC BLOOD PRESSURE: 64 MMHG | OXYGEN SATURATION: 97 % | HEIGHT: 63 IN | BODY MASS INDEX: 44.03 KG/M2 | WEIGHT: 248.5 LBS | HEART RATE: 72 BPM | SYSTOLIC BLOOD PRESSURE: 122 MMHG

## 2020-01-29 DIAGNOSIS — M85.851 OSTEOPENIA OF BOTH HIPS: ICD-10-CM

## 2020-01-29 DIAGNOSIS — I10 HYPERTENSION, UNSPECIFIED TYPE: ICD-10-CM

## 2020-01-29 DIAGNOSIS — R60.0 LOCALIZED EDEMA: Primary | ICD-10-CM

## 2020-01-29 DIAGNOSIS — K21.9 GERD WITHOUT ESOPHAGITIS: ICD-10-CM

## 2020-01-29 DIAGNOSIS — N30.10 INTERSTITIAL CYSTITIS: Chronic | ICD-10-CM

## 2020-01-29 DIAGNOSIS — M85.852 OSTEOPENIA OF BOTH HIPS: ICD-10-CM

## 2020-01-29 DIAGNOSIS — E78.00 PURE HYPERCHOLESTEROLEMIA: ICD-10-CM

## 2020-01-29 PROCEDURE — 99213 OFFICE O/P EST LOW 20 MIN: CPT | Performed by: INTERNAL MEDICINE

## 2020-01-29 PROCEDURE — 99213 OFFICE O/P EST LOW 20 MIN: CPT | Mod: S$PBB,,, | Performed by: INTERNAL MEDICINE

## 2020-01-29 PROCEDURE — 99213 PR OFFICE/OUTPT VISIT, EST, LEVL III, 20-29 MIN: ICD-10-PCS | Mod: S$PBB,,, | Performed by: INTERNAL MEDICINE

## 2020-01-29 RX ORDER — HYOSCYAMINE SULFATE 0.12 MG/1
TABLET SUBLINGUAL
Qty: 270 TABLET | Refills: 1 | Status: SHIPPED | OUTPATIENT
Start: 2020-01-29 | End: 2020-07-29 | Stop reason: SDUPTHER

## 2020-01-29 RX ORDER — EZETIMIBE 10 MG/1
10 TABLET ORAL NIGHTLY
Qty: 90 TABLET | Refills: 1 | Status: SHIPPED | OUTPATIENT
Start: 2020-01-29 | End: 2020-07-29 | Stop reason: SDUPTHER

## 2020-01-29 RX ORDER — AMITRIPTYLINE HYDROCHLORIDE 25 MG/1
25 TABLET, FILM COATED ORAL 3 TIMES DAILY
Qty: 270 TABLET | Refills: 1 | Status: SHIPPED | OUTPATIENT
Start: 2020-01-29 | End: 2020-07-29 | Stop reason: SDUPTHER

## 2020-01-29 RX ORDER — VALSARTAN 160 MG/1
160 TABLET ORAL DAILY
Qty: 90 TABLET | Refills: 1 | Status: SHIPPED | OUTPATIENT
Start: 2020-01-29 | End: 2020-07-29 | Stop reason: SDUPTHER

## 2020-01-29 RX ORDER — POTASSIUM CHLORIDE 750 MG/1
10 TABLET, EXTENDED RELEASE ORAL DAILY
COMMUNITY

## 2020-01-29 RX ORDER — HYDROCHLOROTHIAZIDE 25 MG/1
25 TABLET ORAL 2 TIMES DAILY
Qty: 180 TABLET | Refills: 1 | Status: SHIPPED | OUTPATIENT
Start: 2020-01-29 | End: 2020-07-29 | Stop reason: SDUPTHER

## 2020-01-29 RX ORDER — HYDROCORTISONE 25 MG/G
CREAM TOPICAL DAILY
COMMUNITY
Start: 2020-01-28 | End: 2020-01-29 | Stop reason: SDUPTHER

## 2020-01-29 RX ORDER — ALENDRONATE SODIUM 70 MG/1
TABLET ORAL
Qty: 12 TABLET | Refills: 4 | Status: SHIPPED | OUTPATIENT
Start: 2020-01-29 | End: 2020-07-29 | Stop reason: SDUPTHER

## 2020-01-29 RX ORDER — HYDROCORTISONE 25 MG/G
OINTMENT TOPICAL DAILY
COMMUNITY
End: 2020-07-29

## 2020-01-29 RX ORDER — TIMOLOL MALEATE 5 MG/ML
1 SOLUTION/ DROPS OPHTHALMIC 2 TIMES DAILY
COMMUNITY
Start: 2020-01-23

## 2020-01-29 RX ORDER — PANTOPRAZOLE SODIUM 40 MG/1
40 TABLET, DELAYED RELEASE ORAL DAILY
Qty: 90 TABLET | Refills: 1 | Status: SHIPPED | OUTPATIENT
Start: 2020-01-29 | End: 2020-07-29 | Stop reason: SDUPTHER

## 2020-07-18 LAB
CHOLEST SERPL-MCNC: 166 MG/DL
CHOLEST/HDLC SERPL: 2.9 (CALC)
HDLC SERPL-MCNC: 57 MG/DL
LDLC SERPL CALC-MCNC: 81 MG/DL (CALC)
NONHDLC SERPL-MCNC: 109 MG/DL (CALC)
TRIGL SERPL-MCNC: 188 MG/DL

## 2020-07-20 RX ORDER — ICOSAPENT ETHYL 1000 MG/1
2 CAPSULE ORAL DAILY
Qty: 60 CAPSULE | Refills: 2 | Status: SHIPPED | OUTPATIENT
Start: 2020-07-20 | End: 2020-07-22 | Stop reason: SDUPTHER

## 2020-07-20 NOTE — TELEPHONE ENCOUNTER
----- Message from Elda Paredes MD sent at 7/19/2020  7:10 PM CDT -----  Triglycerides elevated-will need vascepa 2 grams daily-pend it

## 2020-07-21 ENCOUNTER — TELEPHONE (OUTPATIENT)
Dept: FAMILY MEDICINE | Facility: CLINIC | Age: 75
End: 2020-07-21

## 2020-07-21 ENCOUNTER — PATIENT MESSAGE (OUTPATIENT)
Dept: FAMILY MEDICINE | Facility: CLINIC | Age: 75
End: 2020-07-21

## 2020-07-21 NOTE — TELEPHONE ENCOUNTER
The following medication needs a prior authorization:     Medication Name: icosapent ethyl 1g    Dosage: 2g    Frequency: daily    Directions for use: take 2g my mouth daily    Diagnosis: hyperlipidemia E78.5    Is the request for a reauthorization? no    Is the patient currently stable on therapy? New therapy    Please list all therapeutic alternatives previously used with start/end dates and outcome:

## 2020-07-21 NOTE — TELEPHONE ENCOUNTER
----- Message from Marie Barker sent at 7/21/2020  9:52 AM CDT -----  PRIOR AUTHORIZATION, 7/20/20

## 2020-07-22 RX ORDER — ICOSAPENT ETHYL 1000 MG/1
2 CAPSULE ORAL DAILY
Qty: 180 CAPSULE | Refills: 1 | Status: SHIPPED | OUTPATIENT
Start: 2020-07-22 | End: 2020-11-02

## 2020-07-22 NOTE — TELEPHONE ENCOUNTER
----- Message from Marie Barker sent at 7/22/2020 10:51 AM CDT -----  PRIOR AUTHORIZATION, 7/21/20

## 2020-07-26 NOTE — PROGRESS NOTES
"  SUBJECTIVE:    Patient ID: Fransisca Frazier is a 74 y.o. female.    Chief Complaint: Hypertension and Follow-up    HPI     For follow-up labs which revealed cholesterol of 166, HDL of 57, triglyceride 188, and LDL of 81.  These values it definitely higher the prior, with triglyceride of 81-Vascepa but 2 g b.i.d. was ordered.    Weight gain-17 pounds--Last 3 sets of Vitals:    BP good    Bladder-hx IC-    Vitals - 1 value per visit 12/19/2019 1/29/2020 7/29/2020   SYSTOLIC 162 122 134   DIASTOLIC 74 64 80   PULSE 102 72 64   TEMPERATURE - - 98.4   RESPIRATIONS - - 18   SPO2 - 97 97   Weight (lb) 247.14 248.5 264   Weight (kg) 112.1 112.719 119.75   HEIGHT 5' 3" 5' 3" 5' 3"   BODY MASS INDEX 43.78 44.02 46.77   VISIT REPORT - - -   Pain Score  0 0 0       Orders Only on 07/17/2020   Component Date Value Ref Range Status    Cholesterol 07/17/2020 166  <200 mg/dL Final    HDL 07/17/2020 57  > OR = 50 mg/dL Final    Triglycerides 07/17/2020 188* <150 mg/dL Final    LDL Cholesterol 07/17/2020 81  mg/dL (calc) Final    Hdl/Cholesterol Ratio 07/17/2020 2.9  <5.0 (calc) Final    Non HDL Chol. (LDL+VLDL) 07/17/2020 109  <130 mg/dL (calc) Final       Past Medical History:   Diagnosis Date    Back pain     Cystitis, interstitial     Hypertension     Uncontrolled type 2 diabetes mellitus without complication, without long-term current use of insulin 1/22/2018    Wears glasses     Wears partial dentures     upper     Past Surgical History:   Procedure Laterality Date    CHOLECYSTECTOMY  02/21/2018    CYSTOSCOPY      D & C      DILATION AND CURETTAGE OF UTERUS      disk repair      L5 S1    EYE SURGERY      bilat cataract     Family History   Problem Relation Age of Onset    Cancer Father     Cancer Sister     Cancer Brother     Cancer Brother        Marital Status:   Alcohol History:  reports current alcohol use.  Tobacco History:  reports that she quit smoking about 26 years ago. Her smoking use " included cigarettes. She has a 32.00 pack-year smoking history. She has never used smokeless tobacco.  Drug History:  reports no history of drug use.    Review of patient's allergies indicates:  No Known Allergies    Current Outpatient Medications:     alendronate (FOSAMAX) 70 MG tablet, TAKE 1 TABLET EVERY 7 DAYS, Disp: 12 tablet, Rfl: 4    amitriptyline (ELAVIL) 25 MG tablet, Take 1 tablet (25 mg total) by mouth 3 (three) times daily., Disp: 270 tablet, Rfl: 1    cetirizine (ZYRTEC) 10 MG tablet, Take 10 mg by mouth once daily., Disp: , Rfl:     ESTRING 2 mg (7.5 mcg /24 hour) vaginal ring, INSERT 1 RING VAGINALLY EVERY 3 MONTHS, Disp: 1 each, Rfl: 4    ezetimibe (ZETIA) 10 mg tablet, Take 1 tablet (10 mg total) by mouth every evening., Disp: 90 tablet, Rfl: 1    glucosamine-chondroitin 500-400 mg tablet, Take 1 tablet by mouth once daily., Disp: , Rfl:     hydroCHLOROthiazide (HYDRODIURIL) 25 MG tablet, Take 1 tablet (25 mg total) by mouth once daily., Disp: 90 tablet, Rfl: 1    hyoscyamine (OSCIMIN SL) 0.125 mg Subl, DISSOLVE 1 TABLET UNDER THE TONGUE THREE TIMES A DAY AS NEEDED, Disp: 270 tablet, Rfl: 1    nystatin-triamcinolone (MYCOLOG II) cream, Apply topically 4 (four) times daily. for 10 days, Disp: 60 g, Rfl: 1    pantoprazole (PROTONIX) 40 MG tablet, Take 1 tablet (40 mg total) by mouth once daily., Disp: 90 tablet, Rfl: 1    pentosan polysulfate (ELMIRON) 100 mg Cap, Take 1 capsule (100 mg total) by mouth 3 (three) times daily., Disp: 270 capsule, Rfl: 1    potassium chloride (KLOR-CON) 10 MEQ TbSR, Take 10 mEq by mouth once daily., Disp: , Rfl:     sucralfate (CARAFATE) 1 gram tablet, Take 1 tablet (1 g total) by mouth 4 (four) times daily., Disp: 360 tablet, Rfl: 1    timolol maleate 0.5% (TIMOPTIC) 0.5 % Drop, Place 1 drop into both eyes 2 (two) times daily., Disp: , Rfl:     valsartan (DIOVAN) 160 MG tablet, Take 1 tablet (160 mg total) by mouth once daily., Disp: 90 tablet, Rfl: 1    " icosapent ethyL (VASCEPA) 1 gram Cap, Take 2 g by mouth once daily. (Patient not taking: Reported on 7/29/2020), Disp: 180 capsule, Rfl: 1    Review of Systems   Constitutional: Positive for appetite change. Negative for activity change, chills, fatigue, fever and unexpected weight change.   HENT: Negative for congestion, ear pain, hearing loss, postnasal drip, rhinorrhea, sinus pain, sneezing, sore throat, tinnitus and trouble swallowing.    Eyes: Negative for pain, discharge and visual disturbance.   Respiratory: Negative for cough, choking, chest tightness, shortness of breath and wheezing.    Cardiovascular: Negative for chest pain, palpitations and leg swelling.   Gastrointestinal: Positive for abdominal pain (RUQ pain continues, improved with carafate). Negative for abdominal distention, blood in stool, constipation, diarrhea, nausea and vomiting.   Endocrine: Negative for cold intolerance, heat intolerance, polydipsia and polyuria.   Genitourinary: Negative for difficulty urinating, dysuria, frequency, hematuria, menstrual problem, pelvic pain and urgency.   Musculoskeletal: Positive for arthralgias (generalized-knees getting worse). Negative for back pain, joint swelling and neck pain.   Skin: Negative for pallor and rash.   Allergic/Immunologic: Negative for environmental allergies and food allergies.   Neurological: Negative for dizziness, tremors, weakness, numbness and headaches.   Hematological: Does not bruise/bleed easily.   Psychiatric/Behavioral: Negative for agitation, confusion, dysphoric mood, sleep disturbance and suicidal ideas. The patient is not nervous/anxious.           Objective:      Vitals:    07/29/20 1348   BP: 134/80   Pulse: 64   Resp: 18   Temp: 98.4 °F (36.9 °C)   SpO2: 97%   Weight: 119.7 kg (264 lb)   Height: 5' 3" (1.6 m)     Physical Exam  Vitals signs and nursing note reviewed.   Constitutional:       General: She is not in acute distress.     Appearance: Normal appearance. " She is well-developed. She is not ill-appearing.   HENT:      Right Ear: External ear normal.      Left Ear: External ear normal.   Eyes:      General: Lids are normal. No scleral icterus.        Right eye: No discharge.         Left eye: No discharge.      Extraocular Movements: Extraocular movements intact.      Conjunctiva/sclera: Conjunctivae normal.      Pupils: Pupils are equal, round, and reactive to light.      Right eye: Pupil is round and reactive.      Left eye: Pupil is round and reactive.   Neck:      Musculoskeletal: Normal range of motion and neck supple.      Thyroid: No thyromegaly.      Vascular: No carotid bruit or JVD.      Trachea: Trachea normal.   Cardiovascular:      Rate and Rhythm: Normal rate and regular rhythm.      Heart sounds: Normal heart sounds, S1 normal and S2 normal. No murmur. No friction rub. No gallop.    Pulmonary:      Effort: No respiratory distress.      Breath sounds: Normal breath sounds. No wheezing or rales.   Abdominal:      General: Bowel sounds are normal.      Palpations: Abdomen is soft. Abdomen is not rigid. There is no mass.      Tenderness: There is no abdominal tenderness. There is no guarding.   Skin:     General: Skin is warm and dry.      Capillary Refill: Capillary refill takes less than 2 seconds.      Coloration: Skin is not jaundiced or pale.      Findings: No bruising, erythema, lesion or rash.      Nails: There is no clubbing.     Neurological:      Mental Status: She is alert and oriented to person, place, and time.   Psychiatric:         Mood and Affect: Mood normal.         Behavior: Behavior normal. Behavior is cooperative.         Thought Content: Thought content normal.         Judgment: Judgment normal.           Assessment:       1. Hypertension, unspecified type    2. Interstitial cystitis    3. Pure hypercholesterolemia    4. GERD without esophagitis    5. Osteopenia of both hips    6. Dermatitis    7. Menopause    8. Encounter for screening  mammogram for breast cancer    9. BMI 45.0-49.9, adult    10. Type 2 diabetes mellitus without complication, without long-term current use of insulin         Plan:       Hypertension, unspecified type  -     CBC auto differential; Future; Expected date: 12/22/2020  -     valsartan (DIOVAN) 160 MG tablet; Take 1 tablet (160 mg total) by mouth once daily.  Dispense: 90 tablet; Refill: 1  -     hydroCHLOROthiazide (HYDRODIURIL) 25 MG tablet; Take 1 tablet (25 mg total) by mouth once daily.  Dispense: 90 tablet; Refill: 1    Interstitial cystitis  -     pentosan polysulfate (ELMIRON) 100 mg Cap; Take 1 capsule (100 mg total) by mouth 3 (three) times daily.  Dispense: 270 capsule; Refill: 1  -     amitriptyline (ELAVIL) 25 MG tablet; Take 1 tablet (25 mg total) by mouth 3 (three) times daily.  Dispense: 270 tablet; Refill: 1  -     hyoscyamine (OSCIMIN SL) 0.125 mg Subl; DISSOLVE 1 TABLET UNDER THE TONGUE THREE TIMES A DAY AS NEEDED  Dispense: 270 tablet; Refill: 1    Pure hypercholesterolemia  -     Comprehensive metabolic panel; Future; Expected date: 12/22/2020  -     Lipid Panel; Future; Expected date: 12/22/2020  -     ezetimibe (ZETIA) 10 mg tablet; Take 1 tablet (10 mg total) by mouth every evening.  Dispense: 90 tablet; Refill: 1    GERD without esophagitis  -     pantoprazole (PROTONIX) 40 MG tablet; Take 1 tablet (40 mg total) by mouth once daily.  Dispense: 90 tablet; Refill: 1    Osteopenia of both hips  -     alendronate (FOSAMAX) 70 MG tablet; TAKE 1 TABLET EVERY 7 DAYS  Dispense: 12 tablet; Refill: 4    Dermatitis  -     nystatin-triamcinolone (MYCOLOG II) cream; Apply topically 4 (four) times daily. for 10 days  Dispense: 60 g; Refill: 1    Menopause  -     DXA Bone Density Spine And Hip; Future; Expected date: 07/29/2020    Encounter for screening mammogram for breast cancer  -     Mammo Digital Screening Bilat with Prem; Future; Expected date: 07/29/2020    BMI 45.0-49.9, adult    Type 2 diabetes  mellitus without complication, without long-term current use of insulin  -     Hemoglobin A1C; Future; Expected date: 12/22/2020  -     Microalbumin/creatinine urine ratio; Future; Expected date: 12/22/2020  -     Cologuard Screening (Multitarget Stool DNA); Future; Expected date: 07/29/2020    Other orders  -     sucralfate (CARAFATE) 1 gram tablet; Take 1 tablet (1 g total) by mouth 4 (four) times daily.  Dispense: 360 tablet; Refill: 1      No follow-ups on file.        7/29/2020 Elda Paredes M.D.

## 2020-07-29 ENCOUNTER — OFFICE VISIT (OUTPATIENT)
Dept: FAMILY MEDICINE | Facility: CLINIC | Age: 75
End: 2020-07-29
Payer: MEDICARE

## 2020-07-29 VITALS
SYSTOLIC BLOOD PRESSURE: 134 MMHG | WEIGHT: 264 LBS | OXYGEN SATURATION: 97 % | HEIGHT: 63 IN | DIASTOLIC BLOOD PRESSURE: 80 MMHG | TEMPERATURE: 98 F | BODY MASS INDEX: 46.78 KG/M2 | RESPIRATION RATE: 18 BRPM | HEART RATE: 64 BPM

## 2020-07-29 DIAGNOSIS — I10 HYPERTENSION, UNSPECIFIED TYPE: Primary | ICD-10-CM

## 2020-07-29 DIAGNOSIS — E11.9 TYPE 2 DIABETES MELLITUS WITHOUT COMPLICATION, WITHOUT LONG-TERM CURRENT USE OF INSULIN: ICD-10-CM

## 2020-07-29 DIAGNOSIS — Z12.31 ENCOUNTER FOR SCREENING MAMMOGRAM FOR BREAST CANCER: ICD-10-CM

## 2020-07-29 DIAGNOSIS — Z78.0 MENOPAUSE: ICD-10-CM

## 2020-07-29 DIAGNOSIS — N30.10 INTERSTITIAL CYSTITIS: Chronic | ICD-10-CM

## 2020-07-29 DIAGNOSIS — M85.851 OSTEOPENIA OF BOTH HIPS: ICD-10-CM

## 2020-07-29 DIAGNOSIS — L30.9 DERMATITIS: ICD-10-CM

## 2020-07-29 DIAGNOSIS — E78.00 PURE HYPERCHOLESTEROLEMIA: ICD-10-CM

## 2020-07-29 DIAGNOSIS — M85.852 OSTEOPENIA OF BOTH HIPS: ICD-10-CM

## 2020-07-29 DIAGNOSIS — K21.9 GERD WITHOUT ESOPHAGITIS: ICD-10-CM

## 2020-07-29 PROCEDURE — 99213 PR OFFICE/OUTPT VISIT, EST, LEVL III, 20-29 MIN: ICD-10-PCS | Mod: S$PBB,,, | Performed by: INTERNAL MEDICINE

## 2020-07-29 PROCEDURE — 99215 OFFICE O/P EST HI 40 MIN: CPT | Performed by: INTERNAL MEDICINE

## 2020-07-29 PROCEDURE — 99213 OFFICE O/P EST LOW 20 MIN: CPT | Mod: S$PBB,,, | Performed by: INTERNAL MEDICINE

## 2020-07-29 RX ORDER — AMITRIPTYLINE HYDROCHLORIDE 25 MG/1
25 TABLET, FILM COATED ORAL 3 TIMES DAILY
Qty: 270 TABLET | Refills: 1 | Status: SHIPPED | OUTPATIENT
Start: 2020-07-29 | End: 2021-02-04 | Stop reason: SDUPTHER

## 2020-07-29 RX ORDER — CETIRIZINE HYDROCHLORIDE 10 MG/1
10 TABLET ORAL DAILY
COMMUNITY
End: 2021-02-04 | Stop reason: SDUPTHER

## 2020-07-29 RX ORDER — SUCRALFATE 1 G/1
1 TABLET ORAL 4 TIMES DAILY
COMMUNITY
End: 2020-07-29 | Stop reason: SDUPTHER

## 2020-07-29 RX ORDER — HYOSCYAMINE SULFATE 0.12 MG/1
TABLET SUBLINGUAL
Qty: 270 TABLET | Refills: 1 | Status: SHIPPED | OUTPATIENT
Start: 2020-07-29 | End: 2021-02-04 | Stop reason: SDUPTHER

## 2020-07-29 RX ORDER — ALENDRONATE SODIUM 70 MG/1
TABLET ORAL
Qty: 12 TABLET | Refills: 4 | Status: SHIPPED | OUTPATIENT
Start: 2020-07-29 | End: 2021-02-04 | Stop reason: SDUPTHER

## 2020-07-29 RX ORDER — SUCRALFATE 1 G/1
1 TABLET ORAL 4 TIMES DAILY
Qty: 360 TABLET | Refills: 1 | Status: SHIPPED | OUTPATIENT
Start: 2020-07-29 | End: 2021-02-04 | Stop reason: SDUPTHER

## 2020-07-29 RX ORDER — NYSTATIN AND TRIAMCINOLONE ACETONIDE 100000; 1 [USP'U]/G; MG/G
CREAM TOPICAL 4 TIMES DAILY
Qty: 60 G | Refills: 1 | Status: SHIPPED | OUTPATIENT
Start: 2020-07-29 | End: 2028-11-02

## 2020-07-29 RX ORDER — VALSARTAN 160 MG/1
160 TABLET ORAL DAILY
Qty: 90 TABLET | Refills: 1 | Status: SHIPPED | OUTPATIENT
Start: 2020-07-29 | End: 2021-02-04 | Stop reason: SDUPTHER

## 2020-07-29 RX ORDER — HYDROCHLOROTHIAZIDE 25 MG/1
25 TABLET ORAL DAILY
Qty: 90 TABLET | Refills: 1 | Status: SHIPPED | OUTPATIENT
Start: 2020-07-29 | End: 2021-02-04 | Stop reason: SDUPTHER

## 2020-07-29 RX ORDER — PENTOSAN POLYSULFATE SODIUM 100 MG/1
100 CAPSULE, GELATIN COATED ORAL 3 TIMES DAILY
Qty: 270 CAPSULE | Refills: 1 | Status: SHIPPED | OUTPATIENT
Start: 2020-07-29 | End: 2021-02-04

## 2020-07-29 RX ORDER — PANTOPRAZOLE SODIUM 40 MG/1
40 TABLET, DELAYED RELEASE ORAL DAILY
Qty: 90 TABLET | Refills: 1 | Status: SHIPPED | OUTPATIENT
Start: 2020-07-29 | End: 2021-02-04 | Stop reason: SDUPTHER

## 2020-07-29 RX ORDER — EZETIMIBE 10 MG/1
10 TABLET ORAL NIGHTLY
Qty: 90 TABLET | Refills: 1 | Status: SHIPPED | OUTPATIENT
Start: 2020-07-29 | End: 2021-02-04 | Stop reason: SDUPTHER

## 2020-07-30 ENCOUNTER — OFFICE VISIT (OUTPATIENT)
Dept: UROGYNECOLOGY | Facility: CLINIC | Age: 75
End: 2020-07-30
Payer: MEDICARE

## 2020-07-30 VITALS
HEIGHT: 63 IN | HEART RATE: 89 BPM | DIASTOLIC BLOOD PRESSURE: 76 MMHG | BODY MASS INDEX: 46.8 KG/M2 | SYSTOLIC BLOOD PRESSURE: 155 MMHG | WEIGHT: 264.13 LBS

## 2020-07-30 DIAGNOSIS — N95.2 ATROPHIC VAGINITIS: ICD-10-CM

## 2020-07-30 DIAGNOSIS — N39.8 VOIDING DYSFUNCTION: ICD-10-CM

## 2020-07-30 DIAGNOSIS — N81.11 CYSTOCELE, MIDLINE: ICD-10-CM

## 2020-07-30 DIAGNOSIS — R35.0 URINARY FREQUENCY: Primary | ICD-10-CM

## 2020-07-30 DIAGNOSIS — Z46.89 PESSARY MAINTENANCE: ICD-10-CM

## 2020-07-30 LAB
BILIRUB SERPL-MCNC: NORMAL MG/DL
BLOOD URINE, POC: NORMAL
CLARITY, POC UA: CLEAR
COLOR, POC UA: YELLOW
GLUCOSE UR QL STRIP: NORMAL
KETONES UR QL STRIP: NORMAL
LEUKOCYTE ESTERASE URINE, POC: NORMAL
NITRITE, POC UA: NORMAL
PH, POC UA: 6
PROTEIN, POC: NORMAL
SPECIFIC GRAVITY, POC UA: 1000
UROBILINOGEN, POC UA: NORMAL

## 2020-07-30 PROCEDURE — 81002 URINALYSIS NONAUTO W/O SCOPE: CPT | Mod: PBBFAC,PO | Performed by: NURSE PRACTITIONER

## 2020-07-30 PROCEDURE — 99999 PR PBB SHADOW E&M-EST. PATIENT-LVL IV: CPT | Mod: PBBFAC,,, | Performed by: NURSE PRACTITIONER

## 2020-07-30 PROCEDURE — 99214 OFFICE O/P EST MOD 30 MIN: CPT | Mod: PBBFAC,PO | Performed by: NURSE PRACTITIONER

## 2020-07-30 PROCEDURE — 99999 PR PBB SHADOW E&M-EST. PATIENT-LVL IV: ICD-10-PCS | Mod: PBBFAC,,, | Performed by: NURSE PRACTITIONER

## 2020-07-30 PROCEDURE — 99213 OFFICE O/P EST LOW 20 MIN: CPT | Mod: S$PBB,,, | Performed by: NURSE PRACTITIONER

## 2020-07-30 PROCEDURE — 99213 PR OFFICE/OUTPT VISIT, EST, LEVL III, 20-29 MIN: ICD-10-PCS | Mod: S$PBB,,, | Performed by: NURSE PRACTITIONER

## 2020-07-30 RX ORDER — NITROFURANTOIN (MACROCRYSTALS) 100 MG/1
100 CAPSULE ORAL EVERY 12 HOURS
Qty: 10 CAPSULE | Refills: 0 | Status: SHIPPED | OUTPATIENT
Start: 2020-07-30 | End: 2020-08-04

## 2020-07-30 NOTE — PROGRESS NOTES
Subjective:       Patient ID: Fransisca Frazier is a 74 y.o. female.    Chief Complaint: 3 month estring    HPI  Fransisca Frazier is a 74 y.o. female who presents today for pessary maintenance.  She was last seen in our office on 12/19/19.  Her  actually ended up changing her estring for her, but the pt can't remember when either in April or May.  She feels that she has been doing well, but has been a little dry recently.  She thinks that this is because her estring might be overdue to be changed.  She denies any bladder complaints/concerns.  Her UA today is negative.  She would like to have some antibiotics on hand for UTI if possible as she has not been getting out during the pandemic.  She denies any other acute complaints/concerns and is ready to proceed with the estring.    Review of Systems   Constitutional: Negative for activity change, fever and unexpected weight change.   HENT: Negative for hearing loss.    Eyes: Negative for visual disturbance.   Respiratory: Negative for shortness of breath and wheezing.    Cardiovascular: Negative for chest pain, palpitations and leg swelling.   Gastrointestinal: Negative for abdominal pain, constipation and diarrhea.   Genitourinary: Positive for frequency. Negative for dyspareunia, dysuria, urgency, vaginal bleeding and vaginal discharge.   Musculoskeletal: Negative for gait problem and neck pain.   Skin: Negative for rash and wound.   Allergic/Immunologic: Negative for immunocompromised state.   Neurological: Negative for tremors, speech difficulty and weakness.   Hematological: Does not bruise/bleed easily.   Psychiatric/Behavioral: Negative for agitation and confusion.       Objective:      Physical Exam  Constitutional:       General: She is not in acute distress.     Appearance: She is well-developed.   HENT:      Head: Normocephalic and atraumatic.   Neck:      Musculoskeletal: Neck supple.      Thyroid: No thyromegaly.   Pulmonary:      Effort: Pulmonary  effort is normal. No respiratory distress.   Abdominal:      Palpations: Abdomen is soft.      Tenderness: There is no abdominal tenderness.      Hernia: No hernia is present.   Musculoskeletal: Normal range of motion.   Skin:     General: Skin is warm and dry.      Findings: No rash.   Neurological:      Mental Status: She is alert and oriented to person, place, and time.   Psychiatric:         Behavior: Behavior normal.       Pelvic Exam:  V: No lesions. No palpable nodes.   Va: no discharge or bleeding.  Good length.  Dominant mldline cystocele Ba=-1.  Meatus:No caruncle or stenosis  Urethra: Non tender. No suburethral masses.  Cx/Cuff: Normal   Uterus: small/non-tender  Ad: No mass or tenderness.  Levators :Symmetrical. Normal tone. Non tender.  BL: Non tender  RV: No hemorrhoids.      Assessment:       1. Urinary frequency    2. Voiding dysfunction    3. Cystocele, midline    4. Pessary maintenance    5. Atrophic vaginitis          Procedure note- estring pessary removed and discarded.  After betadine irrigation of the vagina, a new estring pessary, which the pt provided,  was reinserted into the vagina.  Pt ambulated in the room and denies any discomfort.  NP reminded pt that the first 24-48 hours are the most likely time for the pessary to fall out and to monitor the toilet before flushing.  Pt verbalized understanding.      Plan:       Urinary frequency- monitor  -     POCT URINE DIPSTICK WITHOUT MICROSCOPE    Voiding dysfunction- pt instructed to call us with any symptoms of UTI  -     nitrofurantoin (MACRODANTIN) 100 MG capsule; Take 1 capsule (100 mg total) by mouth every 12 (twelve) hours. for 5 days  Dispense: 10 capsule; Refill: 0    Cystocele, midline- estring as noted above    Pessary maintenance- as noted above    Atrophic vaginitis- as noted above    RTC 3 months

## 2020-08-07 ENCOUNTER — HOSPITAL ENCOUNTER (OUTPATIENT)
Dept: RADIOLOGY | Facility: HOSPITAL | Age: 75
Discharge: HOME | End: 2020-08-07
Attending: INTERNAL MEDICINE
Payer: MEDICARE

## 2020-08-07 DIAGNOSIS — Z78.0 MENOPAUSE: ICD-10-CM

## 2020-08-07 DIAGNOSIS — Z12.31 ENCOUNTER FOR SCREENING MAMMOGRAM FOR BREAST CANCER: ICD-10-CM

## 2020-08-07 PROCEDURE — 77080 DXA BONE DENSITY AXIAL: CPT | Mod: TC,PO

## 2020-08-07 PROCEDURE — 77067 SCR MAMMO BI INCL CAD: CPT | Mod: TC,PO

## 2020-08-14 ENCOUNTER — TELEPHONE (OUTPATIENT)
Dept: FAMILY MEDICINE | Facility: CLINIC | Age: 75
End: 2020-08-14

## 2020-08-14 DIAGNOSIS — M81.0 OSTEOPOROSIS, UNSPECIFIED OSTEOPOROSIS TYPE, UNSPECIFIED PATHOLOGICAL FRACTURE PRESENCE: Primary | ICD-10-CM

## 2020-08-14 NOTE — TELEPHONE ENCOUNTER
Pt says she will do Prolia injections. Needs BMP order sent to [facility]. Pt also letting you know that [employer] is telling her she is not due and ins will not pay for one until 01/2022.

## 2020-08-14 NOTE — TELEPHONE ENCOUNTER
----- Message from Elda Paredes MD sent at 8/12/2020  1:17 PM CDT -----  Osteoporosis -prolia recommended

## 2020-08-16 ENCOUNTER — PATIENT MESSAGE (OUTPATIENT)
Dept: FAMILY MEDICINE | Facility: CLINIC | Age: 75
End: 2020-08-16

## 2020-08-16 DIAGNOSIS — N18.30 CHRONIC KIDNEY DISEASE, STAGE III (MODERATE): ICD-10-CM

## 2020-08-16 DIAGNOSIS — M81.0 OSTEOPOROSIS WITHOUT CURRENT PATHOLOGICAL FRACTURE, UNSPECIFIED OSTEOPOROSIS TYPE: Primary | ICD-10-CM

## 2020-08-17 ENCOUNTER — PATIENT MESSAGE (OUTPATIENT)
Dept: FAMILY MEDICINE | Facility: CLINIC | Age: 75
End: 2020-08-17

## 2020-08-17 DIAGNOSIS — Z12.11 SCREEN FOR COLON CANCER: Primary | ICD-10-CM

## 2020-08-17 DIAGNOSIS — Z12.12 SCREENING FOR RECTAL CANCER: ICD-10-CM

## 2020-11-01 NOTE — PROGRESS NOTES
SUBJECTIVE:    Patient ID: Fransisca Frazier is a 75 y.o. female.    Chief Complaint: Weight Check, Hypertension, and Follow-up    HPI     In for follow-up weight check:    Last weight 264 pounds-now 262-her biggest obstacle is the amitryptiline-she is attempting to lose more weight now-    BP-good    Since her last surgery she has intermittent constipation-she has not tried prunes-    Office Visit on 07/30/2020   Component Date Value Ref Range Status    Color, UA 07/30/2020 Yellow   Final    Spec Grav UA 07/30/2020 1,000   Final    pH, UA 07/30/2020 6   Final    WBC, UA 07/30/2020 neg   Final    Nitrite, UA 07/30/2020 neg   Final    Protein 07/30/2020 neg   Final    Glucose, UA 07/30/2020 neg   Final    Ketones, UA 07/30/2020 neg   Final    Urobilinogen, UA 07/30/2020 neg   Final    Bilirubin 07/30/2020 neg   Final    Blood, UA 07/30/2020 neg   Final    Clarity, UA 07/30/2020 Clear   Final   Orders Only on 07/17/2020   Component Date Value Ref Range Status    Cholesterol 07/17/2020 166  <200 mg/dL Final    HDL 07/17/2020 57  > OR = 50 mg/dL Final    Triglycerides 07/17/2020 188* <150 mg/dL Final    LDL Cholesterol 07/17/2020 81  mg/dL (calc) Final    HDL/Cholesterol Ratio 07/17/2020 2.9  <5.0 (calc) Final    Non HDL Chol. (LDL+VLDL) 07/17/2020 109  <130 mg/dL (calc) Final       Past Medical History:   Diagnosis Date    Back pain     Cystitis, interstitial     Hypertension     Uncontrolled type 2 diabetes mellitus without complication, without long-term current use of insulin 1/22/2018    Wears glasses     Wears partial dentures     upper     Past Surgical History:   Procedure Laterality Date    CHOLECYSTECTOMY  02/21/2018    CYSTOSCOPY      D & C      DILATION AND CURETTAGE OF UTERUS      disk repair      L5 S1    EYE SURGERY      bilat cataract     Family History   Problem Relation Age of Onset    Cancer Father     Cancer Sister     Cancer Brother     Cancer Brother        Marital  Status:   Alcohol History:  reports current alcohol use.  Tobacco History:  reports that she quit smoking about 27 years ago. Her smoking use included cigarettes. She has a 32.00 pack-year smoking history. She has never used smokeless tobacco.  Drug History:  reports no history of drug use.    Review of patient's allergies indicates:  No Known Allergies    Current Outpatient Medications:     alendronate (FOSAMAX) 70 MG tablet, TAKE 1 TABLET EVERY 7 DAYS, Disp: 12 tablet, Rfl: 4    amitriptyline (ELAVIL) 25 MG tablet, Take 1 tablet (25 mg total) by mouth 3 (three) times daily., Disp: 270 tablet, Rfl: 1    cetirizine (ZYRTEC) 10 MG tablet, Take 10 mg by mouth once daily., Disp: , Rfl:     ESTRING 2 mg (7.5 mcg /24 hour) vaginal ring, INSERT 1 RING VAGINALLY EVERY 3 MONTHS, Disp: 1 each, Rfl: 4    ezetimibe (ZETIA) 10 mg tablet, Take 1 tablet (10 mg total) by mouth every evening., Disp: 90 tablet, Rfl: 1    glucosamine-chondroitin 500-400 mg tablet, Take 1 tablet by mouth once daily., Disp: , Rfl:     hydroCHLOROthiazide (HYDRODIURIL) 25 MG tablet, Take 1 tablet (25 mg total) by mouth once daily., Disp: 90 tablet, Rfl: 1    hyoscyamine (OSCIMIN SL) 0.125 mg Subl, DISSOLVE 1 TABLET UNDER THE TONGUE THREE TIMES A DAY AS NEEDED, Disp: 270 tablet, Rfl: 1    nystatin-triamcinolone (MYCOLOG II) cream, Apply topically 4 (four) times daily. for 10 days, Disp: 60 g, Rfl: 1    pantoprazole (PROTONIX) 40 MG tablet, Take 1 tablet (40 mg total) by mouth once daily., Disp: 90 tablet, Rfl: 1    pentosan polysulfate (ELMIRON) 100 mg Cap, Take 1 capsule (100 mg total) by mouth 3 (three) times daily., Disp: 270 capsule, Rfl: 1    potassium chloride (KLOR-CON) 10 MEQ TbSR, Take 10 mEq by mouth once daily., Disp: , Rfl:     sucralfate (CARAFATE) 1 gram tablet, Take 1 tablet (1 g total) by mouth 4 (four) times daily., Disp: 360 tablet, Rfl: 1    timolol maleate 0.5% (TIMOPTIC) 0.5 % Drop, Place 1 drop into both eyes 2  (two) times daily., Disp: , Rfl:     valsartan (DIOVAN) 160 MG tablet, Take 1 tablet (160 mg total) by mouth once daily., Disp: 90 tablet, Rfl: 1    ciprofloxacin HCl (CIPRO) 500 MG tablet, Take 1 tablet (500 mg total) by mouth 2 (two) times daily., Disp: 20 tablet, Rfl: 0    fluconazole (DIFLUCAN) 100 MG tablet, Take 1 tablet (100 mg total) by mouth once daily. for 5 days, Disp: 5 tablet, Rfl: 0    Review of Systems   Constitutional: Negative for activity change, appetite change, chills, fatigue, fever and unexpected weight change.   HENT: Negative for congestion, ear pain, hearing loss, postnasal drip, rhinorrhea, sinus pain, sneezing, sore throat, tinnitus and trouble swallowing.    Eyes: Negative for pain, discharge and visual disturbance.   Respiratory: Negative for cough, choking, chest tightness, shortness of breath and wheezing.    Cardiovascular: Negative for chest pain, palpitations and leg swelling.   Gastrointestinal: Positive for constipation (SEE HPI). Negative for abdominal distention, abdominal pain, blood in stool, diarrhea, nausea and vomiting.        SKINNY STOOL FOR YEARS   Endocrine: Negative for cold intolerance, heat intolerance, polydipsia and polyuria.   Genitourinary: Negative for difficulty urinating, dysuria, frequency, hematuria, menstrual problem, pelvic pain and urgency.   Musculoskeletal: Negative for arthralgias, back pain, joint swelling and neck pain.   Skin: Negative for pallor and rash.   Allergic/Immunologic: Negative for environmental allergies and food allergies.   Neurological: Negative for dizziness, tremors, weakness, numbness and headaches.   Hematological: Does not bruise/bleed easily.   Psychiatric/Behavioral: Negative for agitation, confusion, dysphoric mood, sleep disturbance and suicidal ideas. The patient is not nervous/anxious.           Objective:      Vitals:    11/02/20 1348   BP: 136/74   Pulse: 74   Resp: 18   Temp: 98.1 °F (36.7 °C)   SpO2: 99%   Weight:  "118.8 kg (262 lb)   Height: 5' 3" (1.6 m)     Physical Exam  Vitals signs and nursing note reviewed.   Constitutional:       General: She is not in acute distress.     Appearance: She is well-developed. She is obese. She is not ill-appearing or diaphoretic.   HENT:      Head: Normocephalic and atraumatic.      Right Ear: There is no impacted cerumen.      Left Ear: There is no impacted cerumen.      Nose: Nose normal.      Mouth/Throat:      Pharynx: Uvula midline.   Eyes:      General: No scleral icterus.        Right eye: No discharge.         Left eye: No discharge.      Extraocular Movements: Extraocular movements intact.      Conjunctiva/sclera: Conjunctivae normal.      Pupils: Pupils are equal, round, and reactive to light.      Right eye: Pupil is round and reactive.      Left eye: Pupil is round and reactive.   Neck:      Musculoskeletal: Normal range of motion and neck supple. No muscular tenderness.      Thyroid: No thyromegaly.      Vascular: No carotid bruit or JVD.      Trachea: Trachea normal.   Cardiovascular:      Rate and Rhythm: Normal rate and regular rhythm.      Pulses: Normal pulses.      Heart sounds: Normal heart sounds. No murmur. No friction rub. No gallop.    Pulmonary:      Effort: Pulmonary effort is normal. No respiratory distress.      Breath sounds: Normal breath sounds. No wheezing or rales.   Abdominal:      General: Bowel sounds are normal. There is no distension.      Palpations: Abdomen is soft. There is no mass.      Tenderness: There is no abdominal tenderness. There is no right CVA tenderness, left CVA tenderness, guarding or rebound.      Hernia: No hernia is present.   Musculoskeletal: Normal range of motion.         General: No swelling, tenderness, deformity or signs of injury.      Right lower leg: Edema present.      Left lower leg: Edema present.   Skin:     General: Skin is warm and dry.      Coloration: Skin is not jaundiced or pale.      Findings: No bruising, " erythema, lesion or rash.      Nails: There is no clubbing.     Neurological:      General: No focal deficit present.      Mental Status: She is alert and oriented to person, place, and time.      Cranial Nerves: No cranial nerve deficit.      Sensory: No sensory deficit.      Motor: No weakness.      Coordination: Coordination normal.      Gait: Gait normal.      Deep Tendon Reflexes: Reflexes normal.   Psychiatric:         Mood and Affect: Mood normal.         Speech: Speech normal.         Behavior: Behavior normal. Behavior is cooperative.         Thought Content: Thought content normal.         Judgment: Judgment normal.       Protective Sensation (w/ 10 gram monofilament):  Right: Intact  Left: Intact    Visual Inspection:  Normal -  Bilateral    Pedal Pulses:   Right: Present  Left: Present    Posterior tibialis:   Right:Present  Left: Present      Assessment:       1. Slow transit constipation    2. Interstitial cystitis    3. Yeast vaginitis    4. BMI 45.0-49.9, adult         Plan:       Slow transit constipation        -     Try prunes for now    Interstitial cystitis  -     ciprofloxacin HCl (CIPRO) 500 MG tablet; Take 1 tablet (500 mg total) by mouth 2 (two) times daily.  Dispense: 20 tablet; Refill: 0    Yeast vaginitis  -     fluconazole (DIFLUCAN) 100 MG tablet; Take 1 tablet (100 mg total) by mouth once daily. for 5 days  Dispense: 5 tablet; Refill: 0    BMI 45.0-49.9, adult      No follow-ups on file.        11/2/2020 Elda Paredes M.D.

## 2020-11-02 ENCOUNTER — OFFICE VISIT (OUTPATIENT)
Dept: FAMILY MEDICINE | Facility: CLINIC | Age: 75
End: 2020-11-02
Payer: MEDICARE

## 2020-11-02 VITALS
RESPIRATION RATE: 18 BRPM | OXYGEN SATURATION: 99 % | HEIGHT: 63 IN | BODY MASS INDEX: 46.42 KG/M2 | TEMPERATURE: 98 F | WEIGHT: 262 LBS | SYSTOLIC BLOOD PRESSURE: 136 MMHG | HEART RATE: 74 BPM | DIASTOLIC BLOOD PRESSURE: 74 MMHG

## 2020-11-02 DIAGNOSIS — N30.10 INTERSTITIAL CYSTITIS: ICD-10-CM

## 2020-11-02 DIAGNOSIS — B37.31 YEAST VAGINITIS: ICD-10-CM

## 2020-11-02 DIAGNOSIS — K59.01 SLOW TRANSIT CONSTIPATION: Primary | ICD-10-CM

## 2020-11-02 PROCEDURE — 99215 OFFICE O/P EST HI 40 MIN: CPT | Performed by: INTERNAL MEDICINE

## 2020-11-02 PROCEDURE — 99214 PR OFFICE/OUTPT VISIT, EST, LEVL IV, 30-39 MIN: ICD-10-PCS | Mod: S$PBB,,, | Performed by: INTERNAL MEDICINE

## 2020-11-02 PROCEDURE — 99214 OFFICE O/P EST MOD 30 MIN: CPT | Mod: S$PBB,,, | Performed by: INTERNAL MEDICINE

## 2020-11-02 RX ORDER — CIPROFLOXACIN 500 MG/1
500 TABLET ORAL 2 TIMES DAILY
Qty: 20 TABLET | Refills: 0 | Status: SHIPPED | OUTPATIENT
Start: 2020-11-02 | End: 2021-02-04

## 2020-11-02 RX ORDER — FLUCONAZOLE 100 MG/1
100 TABLET ORAL DAILY
Qty: 5 TABLET | Refills: 0 | Status: SHIPPED | OUTPATIENT
Start: 2020-11-02 | End: 2020-11-07

## 2020-11-10 ENCOUNTER — OFFICE VISIT (OUTPATIENT)
Dept: UROGYNECOLOGY | Facility: CLINIC | Age: 75
End: 2020-11-10
Payer: MEDICARE

## 2020-11-10 VITALS
SYSTOLIC BLOOD PRESSURE: 176 MMHG | BODY MASS INDEX: 47.34 KG/M2 | DIASTOLIC BLOOD PRESSURE: 83 MMHG | HEIGHT: 63 IN | WEIGHT: 267.19 LBS | HEART RATE: 88 BPM

## 2020-11-10 DIAGNOSIS — R35.0 URINARY FREQUENCY: Primary | ICD-10-CM

## 2020-11-10 DIAGNOSIS — Z87.440 HISTORY OF RECURRENT UTIS: ICD-10-CM

## 2020-11-10 DIAGNOSIS — Z46.89 PESSARY MAINTENANCE: ICD-10-CM

## 2020-11-10 DIAGNOSIS — N81.11 CYSTOCELE, MIDLINE: ICD-10-CM

## 2020-11-10 DIAGNOSIS — N39.8 VOIDING DYSFUNCTION: ICD-10-CM

## 2020-11-10 DIAGNOSIS — N95.2 ATROPHIC VAGINITIS: ICD-10-CM

## 2020-11-10 LAB
BILIRUB SERPL-MCNC: NORMAL MG/DL
BLOOD URINE, POC: NORMAL
CLARITY, POC UA: CLEAR
COLOR, POC UA: YELLOW
GLUCOSE UR QL STRIP: NORMAL
KETONES UR QL STRIP: NORMAL
LEUKOCYTE ESTERASE URINE, POC: NORMAL
NITRITE, POC UA: NORMAL
PH, POC UA: 6
PROTEIN, POC: NORMAL
SPECIFIC GRAVITY, POC UA: 1010
UROBILINOGEN, POC UA: NORMAL

## 2020-11-10 PROCEDURE — 99214 OFFICE O/P EST MOD 30 MIN: CPT | Mod: PBBFAC,PO | Performed by: NURSE PRACTITIONER

## 2020-11-10 PROCEDURE — 99213 OFFICE O/P EST LOW 20 MIN: CPT | Mod: S$PBB,,, | Performed by: NURSE PRACTITIONER

## 2020-11-10 PROCEDURE — 81002 URINALYSIS NONAUTO W/O SCOPE: CPT | Mod: PBBFAC,PO | Performed by: NURSE PRACTITIONER

## 2020-11-10 PROCEDURE — 99999 PR PBB SHADOW E&M-EST. PATIENT-LVL IV: ICD-10-PCS | Mod: PBBFAC,,, | Performed by: NURSE PRACTITIONER

## 2020-11-10 PROCEDURE — 99999 PR PBB SHADOW E&M-EST. PATIENT-LVL IV: CPT | Mod: PBBFAC,,, | Performed by: NURSE PRACTITIONER

## 2020-11-10 PROCEDURE — 99213 PR OFFICE/OUTPT VISIT, EST, LEVL III, 20-29 MIN: ICD-10-PCS | Mod: S$PBB,,, | Performed by: NURSE PRACTITIONER

## 2020-11-10 RX ORDER — ESTRADIOL 2 MG/1
1 SYSTEM VAGINAL
Qty: 1 EACH | Refills: 3 | Status: SHIPPED | OUTPATIENT
Start: 2020-11-10 | End: 2020-11-23 | Stop reason: SDUPTHER

## 2020-11-10 NOTE — PROGRESS NOTES
Subjective:       Patient ID: Fransisca Frazier is a 75 y.o. female.    Chief Complaint: 3 month estring    HPI  Fransisca Frazier is a 75 y.o. female who presents today for 3 month estring change.  She feels that she is doing about the same.  She denies any problems/concerns with the estring.  She feels that it is comfortable and stays in well.  She denies any real urinary complaints/concerns.  She feels that overall, she is doing ok and is ready to proceed with the estring.    Review of Systems   Constitutional: Negative for activity change, fever and unexpected weight change.   HENT: Negative for hearing loss.    Eyes: Negative for visual disturbance.   Respiratory: Negative for shortness of breath and wheezing.    Cardiovascular: Negative for chest pain, palpitations and leg swelling.   Gastrointestinal: Negative for abdominal pain, constipation and diarrhea.   Genitourinary: Positive for frequency. Negative for dyspareunia, dysuria, urgency, vaginal bleeding and vaginal discharge.   Musculoskeletal: Negative for gait problem and neck pain.   Skin: Negative for rash and wound.   Allergic/Immunologic: Negative for immunocompromised state.   Neurological: Negative for tremors, speech difficulty and weakness.   Hematological: Does not bruise/bleed easily.   Psychiatric/Behavioral: Negative for agitation and confusion.       Objective:      Physical Exam  Constitutional:       General: She is not in acute distress.     Appearance: She is well-developed.   HENT:      Head: Normocephalic and atraumatic.   Neck:      Musculoskeletal: Neck supple.      Thyroid: No thyromegaly.   Pulmonary:      Effort: Pulmonary effort is normal. No respiratory distress.   Abdominal:      Palpations: Abdomen is soft.      Tenderness: There is no abdominal tenderness.      Hernia: No hernia is present.   Musculoskeletal: Normal range of motion.   Skin:     General: Skin is warm and dry.      Findings: No rash.   Neurological:      Mental  Status: She is alert and oriented to person, place, and time.   Psychiatric:         Behavior: Behavior normal.       Pelvic Exam:  V: No lesions. No palpable nodes.   Va: no discharge or bleeding.  Some irritation with removal of the estring.  Dominant midline cystocele Ba=0  Meatus:No caruncle or stenosis  Urethra: Non tender. No suburethral masses.  Cx/Cuff: Normal   Uterus: small, non-tender  Ad: No mass or tenderness.  Levators :Symmetrical. Normal tone. Non tender.  BL: Non tender  RV: No hemorrhoids.      Assessment:       1. Urinary frequency    2. Voiding dysfunction    3. Cystocele, midline    4. Pessary maintenance    5. Atrophic vaginitis          Procedure note- estring pessary removed with forceps and discarded.  After betadine irrigation of the vagina, a new estring pessary, which the pt provided,  was reinserted into the vagina.  Pt ambulated in the room and denies any discomfort.  NP reminded pt that the first 24-48 hours are the most likely time for the pessary to fall out and to monitor the toilet before flushing.  Pt verbalized understanding.      Plan:       Urinary frequency- monitor  -     POCT URINE DIPSTICK WITHOUT MICROSCOPE    Voiding dysfunction- monitor    Cystocele, midline- continue with estring    Pessary maintenance- as noted above    Atrophic vaginitis- as noted above    RTC 3 months

## 2020-11-19 ENCOUNTER — PATIENT MESSAGE (OUTPATIENT)
Dept: UROGYNECOLOGY | Facility: CLINIC | Age: 75
End: 2020-11-19

## 2020-11-20 DIAGNOSIS — N95.2 ATROPHIC VAGINITIS: ICD-10-CM

## 2020-11-20 DIAGNOSIS — Z87.440 HISTORY OF RECURRENT UTIS: ICD-10-CM

## 2020-11-20 DIAGNOSIS — N81.11 CYSTOCELE, MIDLINE: ICD-10-CM

## 2020-11-20 RX ORDER — ESTRADIOL 2 MG/1
1 SYSTEM VAGINAL
Qty: 3 EACH | Refills: 3 | Status: CANCELLED | OUTPATIENT
Start: 2020-11-20 | End: 2021-02-18

## 2020-11-23 DIAGNOSIS — N95.2 ATROPHIC VAGINITIS: ICD-10-CM

## 2020-11-23 DIAGNOSIS — Z87.440 HISTORY OF RECURRENT UTIS: ICD-10-CM

## 2020-11-23 DIAGNOSIS — N81.11 CYSTOCELE, MIDLINE: ICD-10-CM

## 2020-11-24 RX ORDER — ESTRADIOL 2 MG/1
1 SYSTEM VAGINAL
Qty: 1 EACH | Refills: 3 | Status: SHIPPED | OUTPATIENT
Start: 2020-11-24 | End: 2021-11-11 | Stop reason: SDUPTHER

## 2021-01-22 ENCOUNTER — PATIENT MESSAGE (OUTPATIENT)
Dept: ADMINISTRATIVE | Facility: OTHER | Age: 76
End: 2021-01-22

## 2021-01-30 LAB
ALBUMIN SERPL-MCNC: 3.9 G/DL (ref 3.6–5.1)
ALBUMIN/CREAT UR: 3 MCG/MG CREAT
ALBUMIN/GLOB SERPL: 1.3 (CALC) (ref 1–2.5)
ALP SERPL-CCNC: 53 U/L (ref 37–153)
ALT SERPL-CCNC: 14 U/L (ref 6–29)
AST SERPL-CCNC: 21 U/L (ref 10–35)
BASOPHILS # BLD AUTO: 61 CELLS/UL (ref 0–200)
BASOPHILS NFR BLD AUTO: 1.2 %
BILIRUB SERPL-MCNC: 0.6 MG/DL (ref 0.2–1.2)
BUN SERPL-MCNC: 16 MG/DL (ref 7–25)
BUN/CREAT SERPL: 13 (CALC) (ref 6–22)
CALCIUM SERPL-MCNC: 9.7 MG/DL (ref 8.6–10.4)
CHLORIDE SERPL-SCNC: 102 MMOL/L (ref 98–110)
CHOLEST SERPL-MCNC: 176 MG/DL
CHOLEST/HDLC SERPL: 3 (CALC)
CO2 SERPL-SCNC: 29 MMOL/L (ref 20–32)
CREAT SERPL-MCNC: 1.19 MG/DL (ref 0.6–0.93)
CREAT UR-MCNC: 157 MG/DL (ref 20–275)
EOSINOPHIL # BLD AUTO: 342 CELLS/UL (ref 15–500)
EOSINOPHIL NFR BLD AUTO: 6.7 %
ERYTHROCYTE [DISTWIDTH] IN BLOOD BY AUTOMATED COUNT: 12.8 % (ref 11–15)
GFRSERPLBLD MDRD-ARVRAT: 44 ML/MIN/1.73M2
GLOBULIN SER CALC-MCNC: 3.1 G/DL (CALC) (ref 1.9–3.7)
GLUCOSE SERPL-MCNC: 116 MG/DL (ref 65–99)
HBA1C MFR BLD: 5.8 % OF TOTAL HGB
HCT VFR BLD AUTO: 42.8 % (ref 35–45)
HDLC SERPL-MCNC: 58 MG/DL
HGB BLD-MCNC: 13.5 G/DL (ref 11.7–15.5)
LDLC SERPL CALC-MCNC: 93 MG/DL (CALC)
LYMPHOCYTES # BLD AUTO: 1209 CELLS/UL (ref 850–3900)
LYMPHOCYTES NFR BLD AUTO: 23.7 %
MCH RBC QN AUTO: 31.3 PG (ref 27–33)
MCHC RBC AUTO-ENTMCNC: 31.5 G/DL (ref 32–36)
MCV RBC AUTO: 99.3 FL (ref 80–100)
MICROALBUMIN UR-MCNC: 0.5 MG/DL
MONOCYTES # BLD AUTO: 683 CELLS/UL (ref 200–950)
MONOCYTES NFR BLD AUTO: 13.4 %
NEUTROPHILS # BLD AUTO: 2805 CELLS/UL (ref 1500–7800)
NEUTROPHILS NFR BLD AUTO: 55 %
NONHDLC SERPL-MCNC: 118 MG/DL (CALC)
PLATELET # BLD AUTO: 273 THOUSAND/UL (ref 140–400)
PMV BLD REES-ECKER: 12.2 FL (ref 7.5–12.5)
POTASSIUM SERPL-SCNC: 4.1 MMOL/L (ref 3.5–5.3)
PROT SERPL-MCNC: 7 G/DL (ref 6.1–8.1)
RBC # BLD AUTO: 4.31 MILLION/UL (ref 3.8–5.1)
SODIUM SERPL-SCNC: 138 MMOL/L (ref 135–146)
TRIGL SERPL-MCNC: 149 MG/DL
WBC # BLD AUTO: 5.1 THOUSAND/UL (ref 3.8–10.8)

## 2021-02-04 ENCOUNTER — OFFICE VISIT (OUTPATIENT)
Dept: FAMILY MEDICINE | Facility: CLINIC | Age: 76
End: 2021-02-04
Payer: MEDICARE

## 2021-02-04 VITALS
TEMPERATURE: 98 F | RESPIRATION RATE: 18 BRPM | HEIGHT: 63 IN | DIASTOLIC BLOOD PRESSURE: 70 MMHG | WEIGHT: 267 LBS | BODY MASS INDEX: 47.31 KG/M2 | SYSTOLIC BLOOD PRESSURE: 126 MMHG | OXYGEN SATURATION: 98 % | HEART RATE: 60 BPM

## 2021-02-04 DIAGNOSIS — M85.852 OSTEOPENIA OF BOTH HIPS: ICD-10-CM

## 2021-02-04 DIAGNOSIS — K21.9 GERD WITHOUT ESOPHAGITIS: ICD-10-CM

## 2021-02-04 DIAGNOSIS — E78.00 PURE HYPERCHOLESTEROLEMIA: ICD-10-CM

## 2021-02-04 DIAGNOSIS — I10 HYPERTENSION, UNSPECIFIED TYPE: ICD-10-CM

## 2021-02-04 DIAGNOSIS — N30.10 INTERSTITIAL CYSTITIS: Chronic | ICD-10-CM

## 2021-02-04 DIAGNOSIS — E78.5 HYPERLIPIDEMIA, UNSPECIFIED HYPERLIPIDEMIA TYPE: Primary | ICD-10-CM

## 2021-02-04 DIAGNOSIS — R73.01 IFG (IMPAIRED FASTING GLUCOSE): ICD-10-CM

## 2021-02-04 DIAGNOSIS — G47.00 INSOMNIA, UNSPECIFIED TYPE: ICD-10-CM

## 2021-02-04 DIAGNOSIS — M85.851 OSTEOPENIA OF BOTH HIPS: ICD-10-CM

## 2021-02-04 DIAGNOSIS — E83.52 HYPERCALCEMIA: ICD-10-CM

## 2021-02-04 PROCEDURE — 99214 OFFICE O/P EST MOD 30 MIN: CPT | Mod: S$PBB,,, | Performed by: INTERNAL MEDICINE

## 2021-02-04 PROCEDURE — 99214 PR OFFICE/OUTPT VISIT, EST, LEVL IV, 30-39 MIN: ICD-10-PCS | Mod: S$PBB,,, | Performed by: INTERNAL MEDICINE

## 2021-02-04 PROCEDURE — 99214 OFFICE O/P EST MOD 30 MIN: CPT | Performed by: INTERNAL MEDICINE

## 2021-02-04 RX ORDER — HYDROCHLOROTHIAZIDE 25 MG/1
25 TABLET ORAL DAILY
Qty: 90 TABLET | Refills: 1 | Status: SHIPPED | OUTPATIENT
Start: 2021-02-04 | End: 2021-08-05 | Stop reason: SDUPTHER

## 2021-02-04 RX ORDER — CYCLOBENZAPRINE HCL 10 MG
1 TABLET ORAL DAILY PRN
COMMUNITY
Start: 2021-01-14 | End: 2021-08-05

## 2021-02-04 RX ORDER — VALSARTAN 160 MG/1
160 TABLET ORAL DAILY
Qty: 90 TABLET | Refills: 1 | Status: SHIPPED | OUTPATIENT
Start: 2021-02-04 | End: 2021-08-05 | Stop reason: SDUPTHER

## 2021-02-04 RX ORDER — ALENDRONATE SODIUM 70 MG/1
TABLET ORAL
Qty: 12 TABLET | Refills: 4 | Status: SHIPPED | OUTPATIENT
Start: 2021-02-04 | End: 2022-01-01 | Stop reason: SDUPTHER

## 2021-02-04 RX ORDER — EZETIMIBE 10 MG/1
10 TABLET ORAL NIGHTLY
Qty: 90 TABLET | Refills: 1 | Status: SHIPPED | OUTPATIENT
Start: 2021-02-04 | End: 2021-08-05 | Stop reason: SDUPTHER

## 2021-02-04 RX ORDER — PANTOPRAZOLE SODIUM 40 MG/1
40 TABLET, DELAYED RELEASE ORAL DAILY
Qty: 90 TABLET | Refills: 1 | Status: SHIPPED | OUTPATIENT
Start: 2021-02-04 | End: 2021-08-05 | Stop reason: SDUPTHER

## 2021-02-04 RX ORDER — CETIRIZINE HYDROCHLORIDE 10 MG/1
10 TABLET ORAL DAILY
Qty: 90 TABLET | Refills: 2 | Status: SHIPPED | OUTPATIENT
Start: 2021-02-04 | End: 2021-08-05 | Stop reason: SDUPTHER

## 2021-02-04 RX ORDER — AMITRIPTYLINE HYDROCHLORIDE 25 MG/1
25 TABLET, FILM COATED ORAL 3 TIMES DAILY
Qty: 270 TABLET | Refills: 1 | Status: SHIPPED | OUTPATIENT
Start: 2021-02-04 | End: 2021-08-05 | Stop reason: SDUPTHER

## 2021-02-04 RX ORDER — HYOSCYAMINE SULFATE 0.12 MG/1
TABLET SUBLINGUAL
Qty: 270 TABLET | Refills: 1 | Status: SHIPPED | OUTPATIENT
Start: 2021-02-04 | End: 2021-08-05 | Stop reason: SDUPTHER

## 2021-02-04 RX ORDER — SUCRALFATE 1 G/1
1 TABLET ORAL 4 TIMES DAILY
Qty: 360 TABLET | Refills: 1 | Status: SHIPPED | OUTPATIENT
Start: 2021-02-04 | End: 2021-08-05 | Stop reason: SDUPTHER

## 2021-02-04 RX ORDER — DOXYCYCLINE HYCLATE 100 MG
100 TABLET ORAL 2 TIMES DAILY
Qty: 20 TABLET | Refills: 4 | Status: SHIPPED | OUTPATIENT
Start: 2021-02-04 | End: 2021-02-14

## 2021-02-04 RX ORDER — OLOPATADINE HYDROCHLORIDE 1 MG/ML
SOLUTION/ DROPS OPHTHALMIC
COMMUNITY
Start: 2021-01-14 | End: 2021-02-04

## 2021-02-09 ENCOUNTER — IMMUNIZATION (OUTPATIENT)
Dept: PRIMARY CARE CLINIC | Facility: CLINIC | Age: 76
End: 2021-02-09
Payer: MEDICARE

## 2021-02-09 ENCOUNTER — OFFICE VISIT (OUTPATIENT)
Dept: UROGYNECOLOGY | Facility: CLINIC | Age: 76
End: 2021-02-09
Payer: MEDICARE

## 2021-02-09 VITALS
HEART RATE: 101 BPM | SYSTOLIC BLOOD PRESSURE: 141 MMHG | WEIGHT: 267 LBS | DIASTOLIC BLOOD PRESSURE: 75 MMHG | BODY MASS INDEX: 52.42 KG/M2 | HEIGHT: 60 IN

## 2021-02-09 DIAGNOSIS — Z23 NEED FOR VACCINATION: Primary | ICD-10-CM

## 2021-02-09 DIAGNOSIS — R35.0 URINARY FREQUENCY: Primary | ICD-10-CM

## 2021-02-09 DIAGNOSIS — N39.8 VOIDING DYSFUNCTION: ICD-10-CM

## 2021-02-09 DIAGNOSIS — N95.2 ATROPHIC VAGINITIS: ICD-10-CM

## 2021-02-09 DIAGNOSIS — Z46.89 PESSARY MAINTENANCE: ICD-10-CM

## 2021-02-09 DIAGNOSIS — N81.11 CYSTOCELE, MIDLINE: ICD-10-CM

## 2021-02-09 DIAGNOSIS — N30.90 CYSTITIS: ICD-10-CM

## 2021-02-09 LAB
BILIRUB SERPL-MCNC: ABNORMAL MG/DL
BLOOD URINE, POC: ABNORMAL
CLARITY, POC UA: CLEAR
COLOR, POC UA: YELLOW
GLUCOSE UR QL STRIP: ABNORMAL
KETONES UR QL STRIP: ABNORMAL
LEUKOCYTE ESTERASE URINE, POC: ABNORMAL
NITRITE, POC UA: ABNORMAL
PH, POC UA: 6
PROTEIN, POC: ABNORMAL
SPECIFIC GRAVITY, POC UA: 1005
UROBILINOGEN, POC UA: ABNORMAL

## 2021-02-09 PROCEDURE — 99213 OFFICE O/P EST LOW 20 MIN: CPT | Mod: S$PBB,,, | Performed by: NURSE PRACTITIONER

## 2021-02-09 PROCEDURE — 87086 URINE CULTURE/COLONY COUNT: CPT

## 2021-02-09 PROCEDURE — 91300 COVID-19, MRNA, LNP-S, PF, 30 MCG/0.3 ML DOSE VACCINE: ICD-10-PCS | Mod: S$GLB,,, | Performed by: FAMILY MEDICINE

## 2021-02-09 PROCEDURE — 0001A COVID-19, MRNA, LNP-S, PF, 30 MCG/0.3 ML DOSE VACCINE: CPT | Mod: S$GLB,,, | Performed by: FAMILY MEDICINE

## 2021-02-09 PROCEDURE — 99999 PR PBB SHADOW E&M-EST. PATIENT-LVL IV: ICD-10-PCS | Mod: PBBFAC,,, | Performed by: NURSE PRACTITIONER

## 2021-02-09 PROCEDURE — 99213 PR OFFICE/OUTPT VISIT, EST, LEVL III, 20-29 MIN: ICD-10-PCS | Mod: S$PBB,,, | Performed by: NURSE PRACTITIONER

## 2021-02-09 PROCEDURE — 81002 URINALYSIS NONAUTO W/O SCOPE: CPT | Mod: PBBFAC,PO | Performed by: NURSE PRACTITIONER

## 2021-02-09 PROCEDURE — 99999 PR PBB SHADOW E&M-EST. PATIENT-LVL IV: CPT | Mod: PBBFAC,,, | Performed by: NURSE PRACTITIONER

## 2021-02-09 PROCEDURE — 99214 OFFICE O/P EST MOD 30 MIN: CPT | Mod: PBBFAC,PO | Performed by: NURSE PRACTITIONER

## 2021-02-09 PROCEDURE — 87088 URINE BACTERIA CULTURE: CPT

## 2021-02-09 PROCEDURE — 0001A COVID-19, MRNA, LNP-S, PF, 30 MCG/0.3 ML DOSE VACCINE: ICD-10-PCS | Mod: S$GLB,,, | Performed by: FAMILY MEDICINE

## 2021-02-09 PROCEDURE — 91300 COVID-19, MRNA, LNP-S, PF, 30 MCG/0.3 ML DOSE VACCINE: CPT | Mod: S$GLB,,, | Performed by: FAMILY MEDICINE

## 2021-02-11 LAB — BACTERIA UR CULT: ABNORMAL

## 2021-03-03 ENCOUNTER — IMMUNIZATION (OUTPATIENT)
Dept: PRIMARY CARE CLINIC | Facility: CLINIC | Age: 76
End: 2021-03-03
Payer: MEDICARE

## 2021-03-03 DIAGNOSIS — Z23 NEED FOR VACCINATION: Primary | ICD-10-CM

## 2021-03-03 PROCEDURE — 91300 COVID-19, MRNA, LNP-S, PF, 30 MCG/0.3 ML DOSE VACCINE: ICD-10-PCS | Mod: S$GLB,,, | Performed by: FAMILY MEDICINE

## 2021-03-03 PROCEDURE — 91300 COVID-19, MRNA, LNP-S, PF, 30 MCG/0.3 ML DOSE VACCINE: CPT | Mod: S$GLB,,, | Performed by: FAMILY MEDICINE

## 2021-03-03 PROCEDURE — 0002A COVID-19, MRNA, LNP-S, PF, 30 MCG/0.3 ML DOSE VACCINE: ICD-10-PCS | Mod: CV19,S$GLB,, | Performed by: FAMILY MEDICINE

## 2021-03-03 PROCEDURE — 0002A COVID-19, MRNA, LNP-S, PF, 30 MCG/0.3 ML DOSE VACCINE: CPT | Mod: CV19,S$GLB,, | Performed by: FAMILY MEDICINE

## 2021-05-06 ENCOUNTER — OFFICE VISIT (OUTPATIENT)
Dept: FAMILY MEDICINE | Facility: CLINIC | Age: 76
End: 2021-05-06
Payer: MEDICARE

## 2021-05-06 VITALS
DIASTOLIC BLOOD PRESSURE: 74 MMHG | RESPIRATION RATE: 18 BRPM | BODY MASS INDEX: 51.04 KG/M2 | WEIGHT: 260 LBS | SYSTOLIC BLOOD PRESSURE: 126 MMHG | TEMPERATURE: 99 F | HEART RATE: 64 BPM | OXYGEN SATURATION: 98 % | HEIGHT: 60 IN

## 2021-05-06 DIAGNOSIS — I10 HYPERTENSION, UNSPECIFIED TYPE: Primary | ICD-10-CM

## 2021-05-06 DIAGNOSIS — N30.10 INTERSTITIAL CYSTITIS: ICD-10-CM

## 2021-05-06 DIAGNOSIS — K58.0 IRRITABLE BOWEL SYNDROME WITH DIARRHEA: ICD-10-CM

## 2021-05-06 DIAGNOSIS — L71.9 ROSACEA: ICD-10-CM

## 2021-05-06 DIAGNOSIS — R73.01 IFG (IMPAIRED FASTING GLUCOSE): ICD-10-CM

## 2021-05-06 DIAGNOSIS — Z12.31 ENCOUNTER FOR SCREENING MAMMOGRAM FOR BREAST CANCER: ICD-10-CM

## 2021-05-06 PROCEDURE — 99214 PR OFFICE/OUTPT VISIT, EST, LEVL IV, 30-39 MIN: ICD-10-PCS | Mod: S$PBB,,, | Performed by: INTERNAL MEDICINE

## 2021-05-06 PROCEDURE — 99214 OFFICE O/P EST MOD 30 MIN: CPT | Mod: S$PBB,,, | Performed by: INTERNAL MEDICINE

## 2021-05-06 PROCEDURE — 99215 OFFICE O/P EST HI 40 MIN: CPT | Performed by: INTERNAL MEDICINE

## 2021-05-06 RX ORDER — OXYMETAZOLINE HYDROCHLORIDE 1 G/100G
1 CREAM TOPICAL DAILY
COMMUNITY
End: 2021-08-19 | Stop reason: SDUPTHER

## 2021-05-06 RX ORDER — AMOXICILLIN AND CLAVULANATE POTASSIUM 875; 125 MG/1; MG/1
1 TABLET, FILM COATED ORAL 2 TIMES DAILY
Qty: 20 TABLET | Refills: 0 | Status: SHIPPED | OUTPATIENT
Start: 2021-05-06 | End: 2021-08-05

## 2021-05-06 RX ORDER — SARECYCLINE HYDROCHLORIDE 100 MG/1
1 TABLET, COATED ORAL DAILY
COMMUNITY
End: 2021-06-09

## 2021-05-11 ENCOUNTER — OFFICE VISIT (OUTPATIENT)
Dept: UROGYNECOLOGY | Facility: CLINIC | Age: 76
End: 2021-05-11
Payer: MEDICARE

## 2021-05-11 VITALS
SYSTOLIC BLOOD PRESSURE: 150 MMHG | HEART RATE: 87 BPM | DIASTOLIC BLOOD PRESSURE: 76 MMHG | BODY MASS INDEX: 51.03 KG/M2 | WEIGHT: 259.94 LBS | HEIGHT: 60 IN

## 2021-05-11 DIAGNOSIS — N39.8 VOIDING DYSFUNCTION: ICD-10-CM

## 2021-05-11 DIAGNOSIS — R35.0 URINARY FREQUENCY: Primary | ICD-10-CM

## 2021-05-11 DIAGNOSIS — N95.2 ATROPHIC VAGINITIS: ICD-10-CM

## 2021-05-11 DIAGNOSIS — N81.11 CYSTOCELE, MIDLINE: ICD-10-CM

## 2021-05-11 DIAGNOSIS — Z46.89 PESSARY MAINTENANCE: ICD-10-CM

## 2021-05-11 DIAGNOSIS — N89.8 VAGINAL IRRITATION: ICD-10-CM

## 2021-05-11 LAB
BILIRUB SERPL-MCNC: NEGATIVE MG/DL
BLOOD URINE, POC: NEGATIVE
CLARITY, POC UA: CLEAR
COLOR, POC UA: YELLOW
GLUCOSE UR QL STRIP: NEGATIVE
KETONES UR QL STRIP: NEGATIVE
LEUKOCYTE ESTERASE URINE, POC: NEGATIVE
NITRITE, POC UA: NEGATIVE
PH, POC UA: 6
PROTEIN, POC: NEGATIVE
SPECIFIC GRAVITY, POC UA: 1
UROBILINOGEN, POC UA: NEGATIVE

## 2021-05-11 PROCEDURE — 99213 OFFICE O/P EST LOW 20 MIN: CPT | Mod: S$PBB,,, | Performed by: NURSE PRACTITIONER

## 2021-05-11 PROCEDURE — 99214 OFFICE O/P EST MOD 30 MIN: CPT | Mod: PBBFAC,PO | Performed by: NURSE PRACTITIONER

## 2021-05-11 PROCEDURE — 99213 PR OFFICE/OUTPT VISIT, EST, LEVL III, 20-29 MIN: ICD-10-PCS | Mod: S$PBB,,, | Performed by: NURSE PRACTITIONER

## 2021-05-11 PROCEDURE — 99999 PR PBB SHADOW E&M-EST. PATIENT-LVL IV: CPT | Mod: PBBFAC,,, | Performed by: NURSE PRACTITIONER

## 2021-05-11 PROCEDURE — 99999 PR PBB SHADOW E&M-EST. PATIENT-LVL IV: ICD-10-PCS | Mod: PBBFAC,,, | Performed by: NURSE PRACTITIONER

## 2021-05-11 PROCEDURE — 81002 URINALYSIS NONAUTO W/O SCOPE: CPT | Mod: PBBFAC,PO | Performed by: NURSE PRACTITIONER

## 2021-05-11 RX ORDER — FLUCONAZOLE 150 MG/1
150 TABLET ORAL DAILY
Qty: 2 TABLET | Refills: 1 | Status: SHIPPED | OUTPATIENT
Start: 2021-05-11 | End: 2021-05-12

## 2021-07-29 LAB
ALBUMIN SERPL-MCNC: 4 G/DL (ref 3.6–5.1)
ALBUMIN/GLOB SERPL: 1.3 (CALC) (ref 1–2.5)
ALP SERPL-CCNC: 58 U/L (ref 37–153)
ALT SERPL-CCNC: 14 U/L (ref 6–29)
AST SERPL-CCNC: 21 U/L (ref 10–35)
BASOPHILS # BLD AUTO: 48 CELLS/UL (ref 0–200)
BASOPHILS NFR BLD AUTO: 0.7 %
BILIRUB SERPL-MCNC: 0.7 MG/DL (ref 0.2–1.2)
BUN SERPL-MCNC: 15 MG/DL (ref 7–25)
BUN/CREAT SERPL: 13 (CALC) (ref 6–22)
CALCIUM SERPL-MCNC: 9.9 MG/DL (ref 8.6–10.4)
CHLORIDE SERPL-SCNC: 99 MMOL/L (ref 98–110)
CHOLEST SERPL-MCNC: 168 MG/DL
CHOLEST/HDLC SERPL: 2.4 (CALC)
CO2 SERPL-SCNC: 31 MMOL/L (ref 20–32)
CREAT SERPL-MCNC: 1.12 MG/DL (ref 0.6–0.93)
EOSINOPHIL # BLD AUTO: 338 CELLS/UL (ref 15–500)
EOSINOPHIL NFR BLD AUTO: 4.9 %
ERYTHROCYTE [DISTWIDTH] IN BLOOD BY AUTOMATED COUNT: 14.1 % (ref 11–15)
GLOBULIN SER CALC-MCNC: 3.1 G/DL (CALC) (ref 1.9–3.7)
GLUCOSE SERPL-MCNC: 92 MG/DL (ref 65–99)
HBA1C MFR BLD: 5.7 % OF TOTAL HGB
HCT VFR BLD AUTO: 42.2 % (ref 35–45)
HDLC SERPL-MCNC: 70 MG/DL
HGB BLD-MCNC: 13.9 G/DL (ref 11.7–15.5)
LDLC SERPL CALC-MCNC: 80 MG/DL (CALC)
LYMPHOCYTES # BLD AUTO: 1987 CELLS/UL (ref 850–3900)
LYMPHOCYTES NFR BLD AUTO: 28.8 %
MCH RBC QN AUTO: 32.1 PG (ref 27–33)
MCHC RBC AUTO-ENTMCNC: 32.9 G/DL (ref 32–36)
MCV RBC AUTO: 97.5 FL (ref 80–100)
MONOCYTES # BLD AUTO: 959 CELLS/UL (ref 200–950)
MONOCYTES NFR BLD AUTO: 13.9 %
NEUTROPHILS # BLD AUTO: 3567 CELLS/UL (ref 1500–7800)
NEUTROPHILS NFR BLD AUTO: 51.7 %
NONHDLC SERPL-MCNC: 98 MG/DL (CALC)
PLATELET # BLD AUTO: 306 THOUSAND/UL (ref 140–400)
PMV BLD REES-ECKER: 10.9 FL (ref 7.5–12.5)
POTASSIUM SERPL-SCNC: 4.2 MMOL/L (ref 3.5–5.3)
PROT SERPL-MCNC: 7.1 G/DL (ref 6.1–8.1)
RBC # BLD AUTO: 4.33 MILLION/UL (ref 3.8–5.1)
SODIUM SERPL-SCNC: 136 MMOL/L (ref 135–146)
TRIGL SERPL-MCNC: 97 MG/DL
WBC # BLD AUTO: 6.9 THOUSAND/UL (ref 3.8–10.8)

## 2021-08-04 ENCOUNTER — TELEPHONE (OUTPATIENT)
Dept: FAMILY MEDICINE | Facility: CLINIC | Age: 76
End: 2021-08-04

## 2021-08-05 ENCOUNTER — OFFICE VISIT (OUTPATIENT)
Dept: FAMILY MEDICINE | Facility: CLINIC | Age: 76
End: 2021-08-05
Payer: MEDICARE

## 2021-08-05 VITALS
OXYGEN SATURATION: 97 % | BODY MASS INDEX: 51.63 KG/M2 | DIASTOLIC BLOOD PRESSURE: 62 MMHG | HEART RATE: 74 BPM | HEIGHT: 60 IN | WEIGHT: 263 LBS | TEMPERATURE: 99 F | RESPIRATION RATE: 16 BRPM | SYSTOLIC BLOOD PRESSURE: 128 MMHG

## 2021-08-05 DIAGNOSIS — R73.03 PREDIABETES: ICD-10-CM

## 2021-08-05 DIAGNOSIS — T78.40XS ALLERGY, SEQUELA: ICD-10-CM

## 2021-08-05 DIAGNOSIS — N30.10 INTERSTITIAL CYSTITIS: ICD-10-CM

## 2021-08-05 DIAGNOSIS — I10 HYPERTENSION, UNSPECIFIED TYPE: ICD-10-CM

## 2021-08-05 DIAGNOSIS — L71.9 ROSACEA, ACNE: ICD-10-CM

## 2021-08-05 DIAGNOSIS — K21.9 GERD WITHOUT ESOPHAGITIS: ICD-10-CM

## 2021-08-05 DIAGNOSIS — E78.00 PURE HYPERCHOLESTEROLEMIA: ICD-10-CM

## 2021-08-05 DIAGNOSIS — I10 ESSENTIAL HYPERTENSION: Primary | ICD-10-CM

## 2021-08-05 PROBLEM — R10.13 ABDOMINAL PAIN, ACUTE, EPIGASTRIC: Status: RESOLVED | Noted: 2018-02-16 | Resolved: 2021-08-05

## 2021-08-05 PROBLEM — N30.01 ACUTE CYSTITIS WITH HEMATURIA: Status: RESOLVED | Noted: 2018-02-05 | Resolved: 2021-08-05

## 2021-08-05 PROBLEM — E83.52 HYPERCALCEMIA: Status: RESOLVED | Noted: 2018-01-22 | Resolved: 2021-08-05

## 2021-08-05 PROCEDURE — 99214 PR OFFICE/OUTPT VISIT, EST, LEVL IV, 30-39 MIN: ICD-10-PCS | Mod: S$PBB,,, | Performed by: INTERNAL MEDICINE

## 2021-08-05 PROCEDURE — 99215 OFFICE O/P EST HI 40 MIN: CPT | Performed by: INTERNAL MEDICINE

## 2021-08-05 PROCEDURE — 99214 OFFICE O/P EST MOD 30 MIN: CPT | Mod: S$PBB,,, | Performed by: INTERNAL MEDICINE

## 2021-08-05 RX ORDER — HYDROCHLOROTHIAZIDE 25 MG/1
25 TABLET ORAL DAILY
Qty: 90 TABLET | Refills: 1 | Status: SHIPPED | OUTPATIENT
Start: 2021-08-05 | End: 2022-02-21 | Stop reason: SDUPTHER

## 2021-08-05 RX ORDER — EZETIMIBE 10 MG/1
10 TABLET ORAL NIGHTLY
Qty: 90 TABLET | Refills: 1 | Status: SHIPPED | OUTPATIENT
Start: 2021-08-05 | End: 2022-02-21 | Stop reason: SDUPTHER

## 2021-08-05 RX ORDER — CETIRIZINE HYDROCHLORIDE 10 MG/1
10 TABLET ORAL DAILY
Qty: 90 TABLET | Refills: 2 | Status: SHIPPED | OUTPATIENT
Start: 2021-08-05 | End: 2022-01-01

## 2021-08-05 RX ORDER — AMITRIPTYLINE HYDROCHLORIDE 25 MG/1
25 TABLET, FILM COATED ORAL 3 TIMES DAILY
Qty: 270 TABLET | Refills: 1 | Status: SHIPPED | OUTPATIENT
Start: 2021-08-05 | End: 2022-02-21 | Stop reason: SDUPTHER

## 2021-08-05 RX ORDER — PANTOPRAZOLE SODIUM 40 MG/1
40 TABLET, DELAYED RELEASE ORAL DAILY
Qty: 90 TABLET | Refills: 1 | Status: SHIPPED | OUTPATIENT
Start: 2021-08-05 | End: 2021-11-10 | Stop reason: SDUPTHER

## 2021-08-05 RX ORDER — HYOSCYAMINE SULFATE 0.12 MG/1
TABLET SUBLINGUAL
Qty: 270 TABLET | Refills: 1 | Status: SHIPPED | OUTPATIENT
Start: 2021-08-05 | End: 2022-02-21 | Stop reason: SDUPTHER

## 2021-08-05 RX ORDER — VALSARTAN 160 MG/1
160 TABLET ORAL DAILY
Qty: 90 TABLET | Refills: 1 | Status: SHIPPED | OUTPATIENT
Start: 2021-08-05 | End: 2022-02-21 | Stop reason: SDUPTHER

## 2021-08-05 RX ORDER — SUCRALFATE 1 G/1
1 TABLET ORAL 4 TIMES DAILY
Qty: 360 TABLET | Refills: 1 | Status: SHIPPED | OUTPATIENT
Start: 2021-08-05

## 2021-08-09 ENCOUNTER — HOSPITAL ENCOUNTER (OUTPATIENT)
Dept: RADIOLOGY | Facility: HOSPITAL | Age: 76
Discharge: HOME | End: 2021-08-09
Attending: INTERNAL MEDICINE
Payer: MEDICARE

## 2021-08-09 DIAGNOSIS — Z12.31 ENCOUNTER FOR SCREENING MAMMOGRAM FOR BREAST CANCER: ICD-10-CM

## 2021-08-09 PROCEDURE — 77067 SCR MAMMO BI INCL CAD: CPT | Mod: TC,PO

## 2021-08-10 ENCOUNTER — OFFICE VISIT (OUTPATIENT)
Dept: UROGYNECOLOGY | Facility: CLINIC | Age: 76
End: 2021-08-10
Payer: MEDICARE

## 2021-08-10 VITALS
HEIGHT: 61 IN | SYSTOLIC BLOOD PRESSURE: 159 MMHG | WEIGHT: 263 LBS | BODY MASS INDEX: 49.65 KG/M2 | HEART RATE: 98 BPM | DIASTOLIC BLOOD PRESSURE: 77 MMHG

## 2021-08-10 DIAGNOSIS — N95.2 ATROPHIC VAGINITIS: ICD-10-CM

## 2021-08-10 DIAGNOSIS — R35.0 URINARY FREQUENCY: ICD-10-CM

## 2021-08-10 DIAGNOSIS — N81.11 CYSTOCELE, MIDLINE: ICD-10-CM

## 2021-08-10 DIAGNOSIS — N39.8 VOIDING DYSFUNCTION: Primary | ICD-10-CM

## 2021-08-10 DIAGNOSIS — Z46.89 PESSARY MAINTENANCE: ICD-10-CM

## 2021-08-10 LAB
BILIRUB SERPL-MCNC: NEGATIVE MG/DL
BLOOD URINE, POC: NEGATIVE
CLARITY, POC UA: ABNORMAL
COLOR, POC UA: YELLOW
GLUCOSE UR QL STRIP: NORMAL
KETONES UR QL STRIP: NEGATIVE
LEUKOCYTE ESTERASE URINE, POC: ABNORMAL
NITRITE, POC UA: NEGATIVE
PH, POC UA: 7
PROTEIN, POC: NEGATIVE
SPECIFIC GRAVITY, POC UA: 1
UROBILINOGEN, POC UA: NORMAL

## 2021-08-10 PROCEDURE — 99213 OFFICE O/P EST LOW 20 MIN: CPT | Mod: S$PBB,,, | Performed by: NURSE PRACTITIONER

## 2021-08-10 PROCEDURE — 99214 OFFICE O/P EST MOD 30 MIN: CPT | Mod: PBBFAC,PO | Performed by: NURSE PRACTITIONER

## 2021-08-10 PROCEDURE — 99999 PR PBB SHADOW E&M-EST. PATIENT-LVL IV: CPT | Mod: PBBFAC,,, | Performed by: NURSE PRACTITIONER

## 2021-08-10 PROCEDURE — 81002 URINALYSIS NONAUTO W/O SCOPE: CPT | Mod: PBBFAC,PO | Performed by: NURSE PRACTITIONER

## 2021-08-10 PROCEDURE — 87086 URINE CULTURE/COLONY COUNT: CPT | Performed by: NURSE PRACTITIONER

## 2021-08-10 PROCEDURE — 99999 PR PBB SHADOW E&M-EST. PATIENT-LVL IV: ICD-10-PCS | Mod: PBBFAC,,, | Performed by: NURSE PRACTITIONER

## 2021-08-10 PROCEDURE — 99213 PR OFFICE/OUTPT VISIT, EST, LEVL III, 20-29 MIN: ICD-10-PCS | Mod: S$PBB,,, | Performed by: NURSE PRACTITIONER

## 2021-08-12 LAB — BACTERIA UR CULT: NO GROWTH

## 2021-11-10 ENCOUNTER — OFFICE VISIT (OUTPATIENT)
Dept: FAMILY MEDICINE | Facility: CLINIC | Age: 76
End: 2021-11-10
Payer: MEDICARE

## 2021-11-10 VITALS
BODY MASS INDEX: 50.03 KG/M2 | HEART RATE: 64 BPM | RESPIRATION RATE: 14 BRPM | WEIGHT: 265 LBS | SYSTOLIC BLOOD PRESSURE: 126 MMHG | HEIGHT: 61 IN | TEMPERATURE: 98 F | DIASTOLIC BLOOD PRESSURE: 64 MMHG | OXYGEN SATURATION: 97 %

## 2021-11-10 DIAGNOSIS — N30.10 INTERSTITIAL CYSTITIS: ICD-10-CM

## 2021-11-10 DIAGNOSIS — R19.5 CHANGE IN STOOL CALIBER: ICD-10-CM

## 2021-11-10 DIAGNOSIS — E11.9 TYPE 2 DIABETES MELLITUS WITHOUT COMPLICATION, WITHOUT LONG-TERM CURRENT USE OF INSULIN: ICD-10-CM

## 2021-11-10 DIAGNOSIS — Z23 NEEDS FLU SHOT: ICD-10-CM

## 2021-11-10 DIAGNOSIS — M81.0 OSTEOPOROSIS WITHOUT CURRENT PATHOLOGICAL FRACTURE, UNSPECIFIED OSTEOPOROSIS TYPE: ICD-10-CM

## 2021-11-10 DIAGNOSIS — E78.00 PURE HYPERCHOLESTEROLEMIA: ICD-10-CM

## 2021-11-10 DIAGNOSIS — R60.0 LOCALIZED EDEMA: ICD-10-CM

## 2021-11-10 DIAGNOSIS — I10 ESSENTIAL HYPERTENSION: Primary | ICD-10-CM

## 2021-11-10 DIAGNOSIS — L71.8 ROSACEA DUE TO TOPICAL CORTICOSTEROID: ICD-10-CM

## 2021-11-10 DIAGNOSIS — T49.0X5A ROSACEA DUE TO TOPICAL CORTICOSTEROID: ICD-10-CM

## 2021-11-10 DIAGNOSIS — K21.9 GERD WITHOUT ESOPHAGITIS: ICD-10-CM

## 2021-11-10 DIAGNOSIS — R73.03 PREDIABETES: ICD-10-CM

## 2021-11-10 PROCEDURE — 99214 OFFICE O/P EST MOD 30 MIN: CPT | Mod: S$PBB,,, | Performed by: INTERNAL MEDICINE

## 2021-11-10 PROCEDURE — 99214 PR OFFICE/OUTPT VISIT, EST, LEVL IV, 30-39 MIN: ICD-10-PCS | Mod: S$PBB,,, | Performed by: INTERNAL MEDICINE

## 2021-11-10 PROCEDURE — 99215 OFFICE O/P EST HI 40 MIN: CPT | Performed by: INTERNAL MEDICINE

## 2021-11-10 RX ORDER — AMOXICILLIN AND CLAVULANATE POTASSIUM 875; 125 MG/1; MG/1
1 TABLET, FILM COATED ORAL 2 TIMES DAILY
Qty: 20 TABLET | Refills: 0 | Status: SHIPPED | OUTPATIENT
Start: 2021-11-10 | End: 2021-11-10

## 2021-11-10 RX ORDER — AMOXICILLIN AND CLAVULANATE POTASSIUM 875; 125 MG/1; MG/1
1 TABLET, FILM COATED ORAL 2 TIMES DAILY
Qty: 20 TABLET | Refills: 0 | Status: SHIPPED | OUTPATIENT
Start: 2021-11-10 | End: 2022-02-21

## 2021-11-10 RX ORDER — PANTOPRAZOLE SODIUM 40 MG/1
40 TABLET, DELAYED RELEASE ORAL DAILY
Qty: 90 TABLET | Refills: 1 | Status: SHIPPED | OUTPATIENT
Start: 2021-11-10 | End: 2022-02-21 | Stop reason: SDUPTHER

## 2021-11-11 ENCOUNTER — OFFICE VISIT (OUTPATIENT)
Dept: UROGYNECOLOGY | Facility: CLINIC | Age: 76
End: 2021-11-11
Payer: MEDICARE

## 2021-11-11 VITALS
SYSTOLIC BLOOD PRESSURE: 142 MMHG | HEART RATE: 79 BPM | BODY MASS INDEX: 49.95 KG/M2 | DIASTOLIC BLOOD PRESSURE: 70 MMHG | HEIGHT: 61 IN | WEIGHT: 264.56 LBS

## 2021-11-11 DIAGNOSIS — R35.0 URINARY FREQUENCY: Primary | ICD-10-CM

## 2021-11-11 DIAGNOSIS — Z46.89 PESSARY MAINTENANCE: ICD-10-CM

## 2021-11-11 DIAGNOSIS — Z87.440 HISTORY OF RECURRENT UTIS: ICD-10-CM

## 2021-11-11 DIAGNOSIS — N95.2 ATROPHIC VAGINITIS: ICD-10-CM

## 2021-11-11 DIAGNOSIS — N39.8 VOIDING DYSFUNCTION: ICD-10-CM

## 2021-11-11 DIAGNOSIS — N81.11 CYSTOCELE, MIDLINE: ICD-10-CM

## 2021-11-11 LAB
BILIRUB SERPL-MCNC: NEGATIVE MG/DL
BLOOD URINE, POC: NEGATIVE
CLARITY, POC UA: CLEAR
COLOR, POC UA: YELLOW
GLUCOSE UR QL STRIP: NEGATIVE
KETONES UR QL STRIP: NEGATIVE
LEUKOCYTE ESTERASE URINE, POC: NEGATIVE
NITRITE, POC UA: NEGATIVE
PH, POC UA: 6
PROTEIN, POC: NEGATIVE
SPECIFIC GRAVITY, POC UA: 1.01
UROBILINOGEN, POC UA: NEGATIVE

## 2021-11-11 PROCEDURE — 99999 PR PBB SHADOW E&M-EST. PATIENT-LVL IV: ICD-10-PCS | Mod: PBBFAC,,, | Performed by: NURSE PRACTITIONER

## 2021-11-11 PROCEDURE — 99213 OFFICE O/P EST LOW 20 MIN: CPT | Mod: S$PBB,,, | Performed by: NURSE PRACTITIONER

## 2021-11-11 PROCEDURE — 99213 PR OFFICE/OUTPT VISIT, EST, LEVL III, 20-29 MIN: ICD-10-PCS | Mod: S$PBB,,, | Performed by: NURSE PRACTITIONER

## 2021-11-11 PROCEDURE — 81002 URINALYSIS NONAUTO W/O SCOPE: CPT | Mod: PBBFAC,PO | Performed by: NURSE PRACTITIONER

## 2021-11-11 PROCEDURE — 99214 OFFICE O/P EST MOD 30 MIN: CPT | Mod: PBBFAC,PO | Performed by: NURSE PRACTITIONER

## 2021-11-11 PROCEDURE — 99999 PR PBB SHADOW E&M-EST. PATIENT-LVL IV: CPT | Mod: PBBFAC,,, | Performed by: NURSE PRACTITIONER

## 2021-11-11 RX ORDER — ESTRADIOL 2 MG/1
1 SYSTEM VAGINAL
Qty: 1 EACH | Refills: 3 | Status: SHIPPED | OUTPATIENT
Start: 2021-11-11 | End: 2021-11-17

## 2022-01-01 ENCOUNTER — ANESTHESIA (OUTPATIENT)
Dept: SURGERY | Facility: HOSPITAL | Age: 77
End: 2022-01-01
Payer: MEDICARE

## 2022-01-01 ENCOUNTER — LAB VISIT (OUTPATIENT)
Dept: LAB | Facility: HOSPITAL | Age: 77
End: 2022-01-01
Attending: INTERNAL MEDICINE
Payer: MEDICARE

## 2022-01-01 ENCOUNTER — OFFICE VISIT (OUTPATIENT)
Dept: UROGYNECOLOGY | Facility: CLINIC | Age: 77
End: 2022-01-01
Payer: MEDICARE

## 2022-01-01 ENCOUNTER — OFFICE VISIT (OUTPATIENT)
Dept: FAMILY MEDICINE | Facility: CLINIC | Age: 77
End: 2022-01-01
Payer: MEDICARE

## 2022-01-01 ENCOUNTER — PATIENT MESSAGE (OUTPATIENT)
Dept: FAMILY MEDICINE | Facility: CLINIC | Age: 77
End: 2022-01-01

## 2022-01-01 ENCOUNTER — HOSPITAL ENCOUNTER (OUTPATIENT)
Dept: PREADMISSION TESTING | Facility: HOSPITAL | Age: 77
Discharge: HOME | End: 2022-06-02
Attending: INTERNAL MEDICINE
Payer: MEDICARE

## 2022-01-01 ENCOUNTER — ANESTHESIA EVENT (OUTPATIENT)
Dept: SURGERY | Facility: HOSPITAL | Age: 77
End: 2022-01-01
Payer: MEDICARE

## 2022-01-01 ENCOUNTER — HOSPITAL ENCOUNTER (OUTPATIENT)
Dept: RADIOLOGY | Facility: HOSPITAL | Age: 77
Discharge: HOME | End: 2022-08-10
Attending: INTERNAL MEDICINE
Payer: MEDICARE

## 2022-01-01 ENCOUNTER — HOSPITAL ENCOUNTER (OUTPATIENT)
Facility: HOSPITAL | Age: 77
Discharge: HOME | End: 2022-06-07
Attending: INTERNAL MEDICINE | Admitting: INTERNAL MEDICINE
Payer: MEDICARE

## 2022-01-01 VITALS
DIASTOLIC BLOOD PRESSURE: 80 MMHG | BODY MASS INDEX: 45.89 KG/M2 | HEART RATE: 62 BPM | HEIGHT: 63 IN | OXYGEN SATURATION: 97 % | WEIGHT: 259 LBS | TEMPERATURE: 98 F | RESPIRATION RATE: 16 BRPM | SYSTOLIC BLOOD PRESSURE: 134 MMHG

## 2022-01-01 VITALS
SYSTOLIC BLOOD PRESSURE: 122 MMHG | OXYGEN SATURATION: 96 % | DIASTOLIC BLOOD PRESSURE: 74 MMHG | HEART RATE: 70 BPM | TEMPERATURE: 98 F | BODY MASS INDEX: 46.95 KG/M2 | HEIGHT: 63 IN | RESPIRATION RATE: 16 BRPM | WEIGHT: 265 LBS

## 2022-01-01 VITALS
SYSTOLIC BLOOD PRESSURE: 134 MMHG | BODY MASS INDEX: 47.31 KG/M2 | HEART RATE: 65 BPM | WEIGHT: 267 LBS | DIASTOLIC BLOOD PRESSURE: 83 MMHG | TEMPERATURE: 99 F | RESPIRATION RATE: 18 BRPM | HEIGHT: 63 IN | OXYGEN SATURATION: 98 %

## 2022-01-01 VITALS
SYSTOLIC BLOOD PRESSURE: 115 MMHG | TEMPERATURE: 98 F | RESPIRATION RATE: 18 BRPM | BODY MASS INDEX: 47.66 KG/M2 | WEIGHT: 269 LBS | DIASTOLIC BLOOD PRESSURE: 85 MMHG | HEART RATE: 72 BPM | OXYGEN SATURATION: 97 % | HEIGHT: 63 IN

## 2022-01-01 VITALS
HEIGHT: 63 IN | TEMPERATURE: 99 F | OXYGEN SATURATION: 97 % | WEIGHT: 260 LBS | DIASTOLIC BLOOD PRESSURE: 70 MMHG | HEART RATE: 68 BPM | SYSTOLIC BLOOD PRESSURE: 132 MMHG | BODY MASS INDEX: 46.07 KG/M2 | RESPIRATION RATE: 16 BRPM

## 2022-01-01 VITALS
DIASTOLIC BLOOD PRESSURE: 66 MMHG | BODY MASS INDEX: 49.95 KG/M2 | HEIGHT: 61 IN | SYSTOLIC BLOOD PRESSURE: 144 MMHG | HEART RATE: 97 BPM | WEIGHT: 264.56 LBS

## 2022-01-01 VITALS
RESPIRATION RATE: 16 BRPM | HEART RATE: 62 BPM | OXYGEN SATURATION: 97 % | HEIGHT: 63 IN | SYSTOLIC BLOOD PRESSURE: 122 MMHG | DIASTOLIC BLOOD PRESSURE: 64 MMHG | TEMPERATURE: 98 F | BODY MASS INDEX: 47.31 KG/M2 | WEIGHT: 267 LBS

## 2022-01-01 VITALS — SYSTOLIC BLOOD PRESSURE: 147 MMHG | HEART RATE: 68 BPM | DIASTOLIC BLOOD PRESSURE: 63 MMHG

## 2022-01-01 VITALS
BODY MASS INDEX: 47.27 KG/M2 | HEIGHT: 63 IN | SYSTOLIC BLOOD PRESSURE: 154 MMHG | DIASTOLIC BLOOD PRESSURE: 70 MMHG | HEART RATE: 76 BPM | WEIGHT: 266.75 LBS

## 2022-01-01 DIAGNOSIS — N95.2 ATROPHIC VAGINITIS: ICD-10-CM

## 2022-01-01 DIAGNOSIS — N18.31 STAGE 3A CHRONIC KIDNEY DISEASE: ICD-10-CM

## 2022-01-01 DIAGNOSIS — K21.9 GERD WITHOUT ESOPHAGITIS: ICD-10-CM

## 2022-01-01 DIAGNOSIS — Z46.89 PESSARY MAINTENANCE: ICD-10-CM

## 2022-01-01 DIAGNOSIS — E11.9 TYPE 2 DIABETES MELLITUS WITHOUT COMPLICATION, WITHOUT LONG-TERM CURRENT USE OF INSULIN: ICD-10-CM

## 2022-01-01 DIAGNOSIS — B96.89 UTI DUE TO KLEBSIELLA SPECIES: ICD-10-CM

## 2022-01-01 DIAGNOSIS — M85.851 OSTEOPENIA OF BOTH HIPS: ICD-10-CM

## 2022-01-01 DIAGNOSIS — N39.8 VOIDING DYSFUNCTION: ICD-10-CM

## 2022-01-01 DIAGNOSIS — E78.00 PURE HYPERCHOLESTEROLEMIA: ICD-10-CM

## 2022-01-01 DIAGNOSIS — I10 PRIMARY HYPERTENSION: ICD-10-CM

## 2022-01-01 DIAGNOSIS — N34.2 URETHRITIS: Primary | ICD-10-CM

## 2022-01-01 DIAGNOSIS — I10 HYPERTENSION, UNSPECIFIED TYPE: ICD-10-CM

## 2022-01-01 DIAGNOSIS — R73.03 PREDIABETES: ICD-10-CM

## 2022-01-01 DIAGNOSIS — N34.2 FOLLICULAR URETHRITIS: Primary | ICD-10-CM

## 2022-01-01 DIAGNOSIS — R35.0 URINARY FREQUENCY: Primary | ICD-10-CM

## 2022-01-01 DIAGNOSIS — S06.4X9A EPIDURAL HEMORRHAGE WITH LOSS OF CONSCIOUSNESS OF UNSPECIFIED DURATION, INITIAL ENCOUNTER: ICD-10-CM

## 2022-01-01 DIAGNOSIS — B37.9 ANTIBIOTIC-INDUCED YEAST INFECTION: Primary | ICD-10-CM

## 2022-01-01 DIAGNOSIS — N30.10 INTERSTITIAL CYSTITIS: Primary | ICD-10-CM

## 2022-01-01 DIAGNOSIS — Z12.31 ENCOUNTER FOR SCREENING MAMMOGRAM FOR BREAST CANCER: ICD-10-CM

## 2022-01-01 DIAGNOSIS — R10.9 RIGHT SIDED ABDOMINAL PAIN: ICD-10-CM

## 2022-01-01 DIAGNOSIS — R10.11 RIGHT UPPER QUADRANT ABDOMINAL PAIN: ICD-10-CM

## 2022-01-01 DIAGNOSIS — R60.0 FLUID RETENTION IN LEGS: Primary | ICD-10-CM

## 2022-01-01 DIAGNOSIS — R10.13 EPIGASTRIC PAIN: ICD-10-CM

## 2022-01-01 DIAGNOSIS — T36.95XA ANTIBIOTIC-INDUCED YEAST INFECTION: Primary | ICD-10-CM

## 2022-01-01 DIAGNOSIS — I10 ESSENTIAL HYPERTENSION, MALIGNANT: Primary | ICD-10-CM

## 2022-01-01 DIAGNOSIS — N30.10 INTERSTITIAL CYSTITIS: Chronic | ICD-10-CM

## 2022-01-01 DIAGNOSIS — Z78.0 MENOPAUSE: ICD-10-CM

## 2022-01-01 DIAGNOSIS — Z13.820 ENCOUNTER FOR OSTEOPOROSIS SCREENING IN ASYMPTOMATIC POSTMENOPAUSAL PATIENT: ICD-10-CM

## 2022-01-01 DIAGNOSIS — N39.0 UTI DUE TO KLEBSIELLA SPECIES: ICD-10-CM

## 2022-01-01 DIAGNOSIS — N36.8 URETHRAL IRRITATION: ICD-10-CM

## 2022-01-01 DIAGNOSIS — Z78.0 ENCOUNTER FOR OSTEOPOROSIS SCREENING IN ASYMPTOMATIC POSTMENOPAUSAL PATIENT: ICD-10-CM

## 2022-01-01 DIAGNOSIS — R10.11 RUQ PAIN: Primary | ICD-10-CM

## 2022-01-01 DIAGNOSIS — Z01.818 PRE-OP TESTING: Primary | ICD-10-CM

## 2022-01-01 DIAGNOSIS — N81.11 CYSTOCELE, MIDLINE: ICD-10-CM

## 2022-01-01 DIAGNOSIS — Z23 NEED FOR PNEUMOCOCCAL VACCINATION: ICD-10-CM

## 2022-01-01 DIAGNOSIS — E11.9 DIABETES MELLITUS WITHOUT COMPLICATION: ICD-10-CM

## 2022-01-01 DIAGNOSIS — M85.852 OSTEOPENIA OF BOTH HIPS: ICD-10-CM

## 2022-01-01 LAB
ALBUMIN SERPL BCP-MCNC: 4.1 G/DL (ref 3.5–5.2)
ALBUMIN SERPL-MCNC: 4.1 G/DL (ref 3.6–5.1)
ALBUMIN SERPL-MCNC: 4.3 G/DL (ref 3.6–5.1)
ALBUMIN/CREAT UR: 2.7 UG/MG (ref 0–30)
ALBUMIN/CREAT UR: 6 MCG/MG CREAT
ALBUMIN/GLOB SERPL: 1.2 (CALC) (ref 1–2.5)
ALBUMIN/GLOB SERPL: 1.3 (CALC) (ref 1–2.5)
ALP SERPL-CCNC: 50 U/L (ref 55–135)
ALP SERPL-CCNC: 64 U/L (ref 37–153)
ALP SERPL-CCNC: 66 U/L (ref 37–153)
ALT SERPL W/O P-5'-P-CCNC: 19 U/L (ref 10–44)
ALT SERPL-CCNC: 11 U/L (ref 6–29)
ALT SERPL-CCNC: 13 U/L (ref 6–29)
AMYLASE SERPL-CCNC: 56 U/L (ref 20–110)
ANION GAP SERPL CALC-SCNC: 11 MMOL/L (ref 8–16)
AST SERPL-CCNC: 20 U/L (ref 10–35)
AST SERPL-CCNC: 22 U/L (ref 10–35)
AST SERPL-CCNC: 28 U/L (ref 10–40)
BACTERIA #/AREA URNS HPF: ABNORMAL /HPF
BACTERIA #/AREA URNS HPF: ABNORMAL /HPF
BACTERIA UR CULT: ABNORMAL
BACTERIA UR CULT: ABNORMAL
BASOPHILS # BLD AUTO: 0.03 K/UL (ref 0–0.2)
BASOPHILS # BLD AUTO: 43 CELLS/UL (ref 0–200)
BASOPHILS # BLD AUTO: 43 CELLS/UL (ref 0–200)
BASOPHILS NFR BLD AUTO: 0.6 %
BASOPHILS NFR BLD AUTO: 0.7 %
BASOPHILS NFR BLD: 0.6 % (ref 0–1.9)
BILIRUB SERPL-MCNC: 0.5 MG/DL (ref 0.2–1.2)
BILIRUB SERPL-MCNC: 0.7 MG/DL (ref 0.2–1.2)
BILIRUB SERPL-MCNC: 0.8 MG/DL (ref 0.1–1)
BILIRUB SERPL-MCNC: NORMAL MG/DL
BILIRUB SERPL-MCNC: NORMAL MG/DL
BILIRUB UR QL STRIP: NEGATIVE
BILIRUB UR QL STRIP: NEGATIVE
BILIRUBIN, UA POC OHS: NEGATIVE
BLOOD URINE, POC: NORMAL
BLOOD URINE, POC: NORMAL
BLOOD, UA POC OHS: NEGATIVE
BUN SERPL-MCNC: 15 MG/DL (ref 7–25)
BUN SERPL-MCNC: 16 MG/DL (ref 7–25)
BUN SERPL-MCNC: 18 MG/DL (ref 8–23)
BUN/CREAT SERPL: 13 (CALC) (ref 6–22)
BUN/CREAT SERPL: 13 (CALC) (ref 6–22)
CALCIUM SERPL-MCNC: 10 MG/DL (ref 8.6–10.4)
CALCIUM SERPL-MCNC: 9.6 MG/DL (ref 8.6–10.4)
CALCIUM SERPL-MCNC: 9.7 MG/DL (ref 8.7–10.5)
CHLORIDE SERPL-SCNC: 102 MMOL/L (ref 98–110)
CHLORIDE SERPL-SCNC: 103 MMOL/L (ref 98–110)
CHLORIDE SERPL-SCNC: 99 MMOL/L (ref 95–110)
CHOLEST SERPL-MCNC: 170 MG/DL
CHOLEST SERPL-MCNC: 171 MG/DL
CHOLEST/HDLC SERPL: 3.4 (CALC)
CHOLEST/HDLC SERPL: 3.5 (CALC)
CLARITY UR: ABNORMAL
CLARITY UR: CLEAR
CLARITY, POC UA: CLEAR
CLARITY, POC UA: CLEAR
CLARITY, UA POC OHS: CLEAR
CO2 SERPL-SCNC: 24 MMOL/L (ref 20–32)
CO2 SERPL-SCNC: 26 MMOL/L (ref 23–29)
CO2 SERPL-SCNC: 31 MMOL/L (ref 20–32)
COLOR UR: YELLOW
COLOR UR: YELLOW
COLOR, POC UA: YELLOW
COLOR, POC UA: YELLOW
COLOR, UA POC OHS: YELLOW
CREAT SERPL-MCNC: 1.14 MG/DL (ref 0.6–1)
CREAT SERPL-MCNC: 1.2 MG/DL (ref 0.5–1.4)
CREAT SERPL-MCNC: 1.22 MG/DL (ref 0.6–1)
CREAT UR-MCNC: 126 MG/DL (ref 20–275)
CREAT UR-MCNC: 84 MG/DL (ref 15–325)
DIFFERENTIAL METHOD: ABNORMAL
EGFR: 45 ML/MIN/1.73M2
EGFR: 49 ML/MIN/1.73M2
EOSINOPHIL # BLD AUTO: 0.4 K/UL (ref 0–0.5)
EOSINOPHIL # BLD AUTO: 360 CELLS/UL (ref 15–500)
EOSINOPHIL # BLD AUTO: 750 CELLS/UL (ref 15–500)
EOSINOPHIL NFR BLD AUTO: 12.3 %
EOSINOPHIL NFR BLD AUTO: 5 %
EOSINOPHIL NFR BLD: 7.9 % (ref 0–8)
ERYTHROCYTE [DISTWIDTH] IN BLOOD BY AUTOMATED COUNT: 12.9 % (ref 11–15)
ERYTHROCYTE [DISTWIDTH] IN BLOOD BY AUTOMATED COUNT: 13 % (ref 11–15)
ERYTHROCYTE [DISTWIDTH] IN BLOOD BY AUTOMATED COUNT: 13.5 % (ref 11.5–14.5)
EST. GFR  (AFRICAN AMERICAN): 50 ML/MIN/1.73 M^2
EST. GFR  (NON AFRICAN AMERICAN): 43.4 ML/MIN/1.73 M^2
GLOBULIN SER CALC-MCNC: 3.4 G/DL (CALC) (ref 1.9–3.7)
GLOBULIN SER CALC-MCNC: 3.4 G/DL (CALC) (ref 1.9–3.7)
GLUCOSE SERPL-MCNC: 101 MG/DL (ref 65–99)
GLUCOSE SERPL-MCNC: 103 MG/DL (ref 65–99)
GLUCOSE SERPL-MCNC: 104 MG/DL (ref 70–110)
GLUCOSE UR QL STRIP: NEGATIVE
GLUCOSE UR QL STRIP: NEGATIVE
GLUCOSE UR QL STRIP: NORMAL
GLUCOSE UR QL STRIP: NORMAL
GLUCOSE, UA POC OHS: NEGATIVE
HBA1C MFR BLD: 5.9 % OF TOTAL HGB
HCT VFR BLD AUTO: 42.7 % (ref 37–48.5)
HCT VFR BLD AUTO: 44.5 % (ref 35–45)
HCT VFR BLD AUTO: 44.6 % (ref 35–45)
HDLC SERPL-MCNC: 49 MG/DL
HDLC SERPL-MCNC: 50 MG/DL
HGB BLD-MCNC: 13.9 G/DL (ref 12–16)
HGB BLD-MCNC: 14.5 G/DL (ref 11.7–15.5)
HGB BLD-MCNC: 14.7 G/DL (ref 11.7–15.5)
HGB UR QL STRIP: NEGATIVE
HGB UR QL STRIP: NEGATIVE
HYALINE CASTS #/AREA URNS LPF: 7 /LPF
HYALINE CASTS #/AREA URNS LPF: ABNORMAL /LPF
IMM GRANULOCYTES # BLD AUTO: 0.02 K/UL (ref 0–0.04)
IMM GRANULOCYTES NFR BLD AUTO: 0.4 % (ref 0–0.5)
KETONES UR QL STRIP: NEGATIVE
KETONES UR QL STRIP: NEGATIVE
KETONES UR QL STRIP: NORMAL
KETONES UR QL STRIP: NORMAL
KETONES, UA POC OHS: NEGATIVE
LDLC SERPL CALC-MCNC: 94 MG/DL (CALC)
LDLC SERPL CALC-MCNC: 97 MG/DL (CALC)
LEUKOCYTE ESTERASE UR QL STRIP: ABNORMAL
LEUKOCYTE ESTERASE UR QL STRIP: NEGATIVE
LEUKOCYTE ESTERASE URINE, POC: NORMAL
LEUKOCYTE ESTERASE URINE, POC: NORMAL
LEUKOCYTES, UA POC OHS: NEGATIVE
LIPASE SERPL-CCNC: 34 U/L (ref 4–60)
LYMPHOCYTES # BLD AUTO: 1.1 K/UL (ref 1–4.8)
LYMPHOCYTES # BLD AUTO: 1415 CELLS/UL (ref 850–3900)
LYMPHOCYTES # BLD AUTO: 1519 CELLS/UL (ref 850–3900)
LYMPHOCYTES NFR BLD AUTO: 21.1 %
LYMPHOCYTES NFR BLD AUTO: 23.2 %
LYMPHOCYTES NFR BLD: 20.9 % (ref 18–48)
MCH RBC QN AUTO: 31.5 PG (ref 27–31)
MCH RBC QN AUTO: 31.5 PG (ref 27–33)
MCH RBC QN AUTO: 31.5 PG (ref 27–33)
MCHC RBC AUTO-ENTMCNC: 32.6 G/DL (ref 32–36)
MCHC RBC AUTO-ENTMCNC: 32.6 G/DL (ref 32–36)
MCHC RBC AUTO-ENTMCNC: 33 G/DL (ref 32–36)
MCV RBC AUTO: 95.7 FL (ref 80–100)
MCV RBC AUTO: 96.7 FL (ref 80–100)
MCV RBC AUTO: 97 FL (ref 82–98)
MICROALBUMIN UR DL<=1MG/L-MCNC: 2.3 UG/ML
MICROALBUMIN UR-MCNC: 0.7 MG/DL
MICROSCOPIC COMMENT: ABNORMAL
MONOCYTES # BLD AUTO: 0.9 K/UL (ref 0.3–1)
MONOCYTES # BLD AUTO: 1015 CELLS/UL (ref 200–950)
MONOCYTES # BLD AUTO: 885 CELLS/UL (ref 200–950)
MONOCYTES NFR BLD AUTO: 14.1 %
MONOCYTES NFR BLD AUTO: 14.5 %
MONOCYTES NFR BLD: 16.9 % (ref 4–15)
NEUTROPHILS # BLD AUTO: 2.9 K/UL (ref 1.8–7.7)
NEUTROPHILS # BLD AUTO: 3007 CELLS/UL (ref 1500–7800)
NEUTROPHILS # BLD AUTO: 4262 CELLS/UL (ref 1500–7800)
NEUTROPHILS NFR BLD AUTO: 49.3 %
NEUTROPHILS NFR BLD AUTO: 59.2 %
NEUTROPHILS NFR BLD: 53.3 % (ref 38–73)
NITRITE UR QL STRIP: NEGATIVE
NITRITE UR QL STRIP: POSITIVE
NITRITE, POC UA: NORMAL
NITRITE, POC UA: NORMAL
NITRITE, UA POC OHS: NEGATIVE
NONHDLC SERPL-MCNC: 120 MG/DL (CALC)
NONHDLC SERPL-MCNC: 122 MG/DL (CALC)
NRBC BLD-RTO: 0 /100 WBC
PH UR STRIP: 6 [PH] (ref 5–8)
PH UR STRIP: 7 [PH] (ref 5–8)
PH, POC UA: 5
PH, POC UA: 5
PH, UA POC OHS: 5.5
PLATELET # BLD AUTO: 241 THOUSAND/UL (ref 140–400)
PLATELET # BLD AUTO: 278 THOUSAND/UL (ref 140–400)
PLATELET # BLD AUTO: 285 K/UL (ref 150–450)
PMV BLD AUTO: 11.6 FL (ref 9.2–12.9)
PMV BLD REES-ECKER: 11.9 FL (ref 7.5–12.5)
PMV BLD REES-ECKER: 12.2 FL (ref 7.5–12.5)
POTASSIUM SERPL-SCNC: 3.8 MMOL/L (ref 3.5–5.1)
POTASSIUM SERPL-SCNC: 4.2 MMOL/L (ref 3.5–5.3)
POTASSIUM SERPL-SCNC: 4.2 MMOL/L (ref 3.5–5.3)
PROT SERPL-MCNC: 7.5 G/DL (ref 6.1–8.1)
PROT SERPL-MCNC: 7.7 G/DL (ref 6.1–8.1)
PROT SERPL-MCNC: 7.9 G/DL (ref 6–8.4)
PROT UR QL STRIP: NEGATIVE
PROT UR QL STRIP: NEGATIVE
PROTEIN, POC: NORMAL
PROTEIN, POC: NORMAL
PROTEIN, UA POC OHS: NEGATIVE
RBC # BLD AUTO: 4.41 M/UL (ref 4–5.4)
RBC # BLD AUTO: 4.6 MILLION/UL (ref 3.8–5.1)
RBC # BLD AUTO: 4.66 MILLION/UL (ref 3.8–5.1)
RBC #/AREA URNS HPF: 2 /HPF (ref 0–4)
RBC #/AREA URNS HPF: ABNORMAL /HPF
SERVICE CMNT-IMP: ABNORMAL
SODIUM SERPL-SCNC: 136 MMOL/L (ref 136–145)
SODIUM SERPL-SCNC: 138 MMOL/L (ref 135–146)
SODIUM SERPL-SCNC: 139 MMOL/L (ref 135–146)
SP GR UR STRIP: 1.01 (ref 1–1.03)
SP GR UR STRIP: 1.01 (ref 1–1.03)
SPECIFIC GRAVITY, POC UA: 1.01
SPECIFIC GRAVITY, POC UA: 1.01
SPECIFIC GRAVITY, UA POC OHS: 1.02
SQUAMOUS #/AREA URNS HPF: 3 /HPF
SQUAMOUS #/AREA URNS HPF: ABNORMAL /HPF
TRIGL SERPL-MCNC: 151 MG/DL
TRIGL SERPL-MCNC: 165 MG/DL
URN SPEC COLLECT METH UR: ABNORMAL
URN SPEC COLLECT METH UR: NORMAL
UROBILINOGEN UR STRIP-ACNC: NEGATIVE EU/DL
UROBILINOGEN UR STRIP-ACNC: NEGATIVE EU/DL
UROBILINOGEN, POC UA: 0.2
UROBILINOGEN, POC UA: NORMAL
UROBILINOGEN, UA POC OHS: 0.2
WBC # BLD AUTO: 5.45 K/UL (ref 3.9–12.7)
WBC # BLD AUTO: 6.1 THOUSAND/UL (ref 3.8–10.8)
WBC # BLD AUTO: 7.2 THOUSAND/UL (ref 3.8–10.8)
WBC #/AREA URNS HPF: 11 /HPF (ref 0–5)
WBC #/AREA URNS HPF: ABNORMAL /HPF
YEAST #/AREA URNS HPF: ABNORMAL /HPF

## 2022-01-01 PROCEDURE — 99213 PR OFFICE/OUTPT VISIT, EST, LEVL III, 20-29 MIN: ICD-10-PCS | Mod: S$PBB,,, | Performed by: NURSE PRACTITIONER

## 2022-01-01 PROCEDURE — 99213 OFFICE O/P EST LOW 20 MIN: CPT | Mod: S$PBB,,, | Performed by: NURSE PRACTITIONER

## 2022-01-01 PROCEDURE — 93005 ELECTROCARDIOGRAM TRACING: CPT | Performed by: INTERNAL MEDICINE

## 2022-01-01 PROCEDURE — 99999 PR PBB SHADOW E&M-EST. PATIENT-LVL III: CPT | Mod: PBBFAC,,, | Performed by: NURSE PRACTITIONER

## 2022-01-01 PROCEDURE — 99213 OFFICE O/P EST LOW 20 MIN: CPT | Mod: PBBFAC,PO | Performed by: NURSE PRACTITIONER

## 2022-01-01 PROCEDURE — 99214 OFFICE O/P EST MOD 30 MIN: CPT | Mod: PBBFAC,PO | Performed by: NURSE PRACTITIONER

## 2022-01-01 PROCEDURE — 99214 OFFICE O/P EST MOD 30 MIN: CPT | Mod: 25,S$PBB,AQ, | Performed by: INTERNAL MEDICINE

## 2022-01-01 PROCEDURE — 36415 COLL VENOUS BLD VENIPUNCTURE: CPT | Performed by: INTERNAL MEDICINE

## 2022-01-01 PROCEDURE — 99213 PR OFFICE/OUTPT VISIT, EST, LEVL III, 20-29 MIN: ICD-10-PCS | Mod: 95,,, | Performed by: INTERNAL MEDICINE

## 2022-01-01 PROCEDURE — 25000003 PHARM REV CODE 250: Performed by: NURSE ANESTHETIST, CERTIFIED REGISTERED

## 2022-01-01 PROCEDURE — 90677 PCV20 VACCINE IM: CPT | Mod: PBBFAC | Performed by: INTERNAL MEDICINE

## 2022-01-01 PROCEDURE — 99215 OFFICE O/P EST HI 40 MIN: CPT | Performed by: INTERNAL MEDICINE

## 2022-01-01 PROCEDURE — 63600175 PHARM REV CODE 636 W HCPCS: Performed by: NURSE ANESTHETIST, CERTIFIED REGISTERED

## 2022-01-01 PROCEDURE — 80053 COMPREHEN METABOLIC PANEL: CPT | Performed by: INTERNAL MEDICINE

## 2022-01-01 PROCEDURE — 99214 PR OFFICE/OUTPT VISIT, EST, LEVL IV, 30-39 MIN: ICD-10-PCS | Mod: 25,S$PBB,AQ, | Performed by: INTERNAL MEDICINE

## 2022-01-01 PROCEDURE — 82043 UR ALBUMIN QUANTITATIVE: CPT | Performed by: INTERNAL MEDICINE

## 2022-01-01 PROCEDURE — 99214 PR OFFICE/OUTPT VISIT, EST, LEVL IV, 30-39 MIN: ICD-10-PCS | Mod: S$PBB,AQ,, | Performed by: INTERNAL MEDICINE

## 2022-01-01 PROCEDURE — 99214 OFFICE O/P EST MOD 30 MIN: CPT | Mod: S$PBB,AQ,, | Performed by: INTERNAL MEDICINE

## 2022-01-01 PROCEDURE — 81003 URINALYSIS AUTO W/O SCOPE: CPT | Performed by: INTERNAL MEDICINE

## 2022-01-01 PROCEDURE — 43239 EGD BIOPSY SINGLE/MULTIPLE: CPT | Performed by: INTERNAL MEDICINE

## 2022-01-01 PROCEDURE — 93010 EKG 12-LEAD: ICD-10-PCS | Mod: ,,, | Performed by: INTERNAL MEDICINE

## 2022-01-01 PROCEDURE — 77080 DXA BONE DENSITY AXIAL: CPT | Mod: TC,PO

## 2022-01-01 PROCEDURE — 99999 PR PBB SHADOW E&M-EST. PATIENT-LVL IV: CPT | Mod: PBBFAC,,, | Performed by: NURSE PRACTITIONER

## 2022-01-01 PROCEDURE — 99213 OFFICE O/P EST LOW 20 MIN: CPT | Mod: 95,,, | Performed by: INTERNAL MEDICINE

## 2022-01-01 PROCEDURE — 87186 SC STD MICRODIL/AGAR DIL: CPT | Performed by: INTERNAL MEDICINE

## 2022-01-01 PROCEDURE — 81002 URINALYSIS NONAUTO W/O SCOPE: CPT | Mod: PBBFAC,PO | Performed by: NURSE PRACTITIONER

## 2022-01-01 PROCEDURE — 37000009 HC ANESTHESIA EA ADD 15 MINS: Performed by: INTERNAL MEDICINE

## 2022-01-01 PROCEDURE — 81003 URINALYSIS AUTO W/O SCOPE: CPT | Mod: PBBFAC,PO | Performed by: NURSE PRACTITIONER

## 2022-01-01 PROCEDURE — 99999 PR PBB SHADOW E&M-EST. PATIENT-LVL IV: ICD-10-PCS | Mod: PBBFAC,,, | Performed by: NURSE PRACTITIONER

## 2022-01-01 PROCEDURE — 99214 OFFICE O/P EST MOD 30 MIN: CPT | Performed by: INTERNAL MEDICINE

## 2022-01-01 PROCEDURE — 93010 ELECTROCARDIOGRAM REPORT: CPT | Mod: ,,, | Performed by: INTERNAL MEDICINE

## 2022-01-01 PROCEDURE — 83690 ASSAY OF LIPASE: CPT | Performed by: INTERNAL MEDICINE

## 2022-01-01 PROCEDURE — 77063 BREAST TOMOSYNTHESIS BI: CPT | Mod: TC,PO

## 2022-01-01 PROCEDURE — 88305 TISSUE EXAM BY PATHOLOGIST: CPT | Mod: TC

## 2022-01-01 PROCEDURE — 87077 CULTURE AEROBIC IDENTIFY: CPT | Performed by: INTERNAL MEDICINE

## 2022-01-01 PROCEDURE — 87086 URINE CULTURE/COLONY COUNT: CPT | Performed by: INTERNAL MEDICINE

## 2022-01-01 PROCEDURE — 85025 COMPLETE CBC W/AUTO DIFF WBC: CPT | Performed by: INTERNAL MEDICINE

## 2022-01-01 PROCEDURE — 43237 ENDOSCOPIC US EXAM ESOPH: CPT | Performed by: INTERNAL MEDICINE

## 2022-01-01 PROCEDURE — 99999 PR PBB SHADOW E&M-EST. PATIENT-LVL III: ICD-10-PCS | Mod: PBBFAC,,, | Performed by: NURSE PRACTITIONER

## 2022-01-01 PROCEDURE — 37000008 HC ANESTHESIA 1ST 15 MINUTES: Performed by: INTERNAL MEDICINE

## 2022-01-01 PROCEDURE — 82150 ASSAY OF AMYLASE: CPT | Performed by: INTERNAL MEDICINE

## 2022-01-01 PROCEDURE — 82570 ASSAY OF URINE CREATININE: CPT | Performed by: INTERNAL MEDICINE

## 2022-01-01 PROCEDURE — 81001 URINALYSIS AUTO W/SCOPE: CPT | Performed by: INTERNAL MEDICINE

## 2022-01-01 PROCEDURE — 27200043 HC FORCEPS, BIOPSY: Performed by: INTERNAL MEDICINE

## 2022-01-01 RX ORDER — EZETIMIBE 10 MG/1
10 TABLET ORAL NIGHTLY
Qty: 90 TABLET | Refills: 1 | Status: SHIPPED | OUTPATIENT
Start: 2022-01-01

## 2022-01-01 RX ORDER — CETIRIZINE HYDROCHLORIDE 10 MG/1
10 TABLET ORAL DAILY
Qty: 90 TABLET | Refills: 2 | Status: SHIPPED | OUTPATIENT
Start: 2022-01-01

## 2022-01-01 RX ORDER — AMITRIPTYLINE HYDROCHLORIDE 25 MG/1
25 TABLET, FILM COATED ORAL 3 TIMES DAILY
Qty: 270 TABLET | Refills: 1 | Status: SHIPPED | OUTPATIENT
Start: 2022-01-01

## 2022-01-01 RX ORDER — PANTOPRAZOLE SODIUM 40 MG/1
40 TABLET, DELAYED RELEASE ORAL DAILY
Qty: 90 TABLET | Refills: 1 | Status: SHIPPED | OUTPATIENT
Start: 2022-01-01 | End: 2022-01-01 | Stop reason: SDUPTHER

## 2022-01-01 RX ORDER — FLUCONAZOLE 150 MG/1
150 TABLET ORAL DAILY
Qty: 1 TABLET | Refills: 0 | Status: SHIPPED | OUTPATIENT
Start: 2022-01-01 | End: 2022-01-01

## 2022-01-01 RX ORDER — PHENAZOPYRIDINE HYDROCHLORIDE 100 MG/1
100 TABLET, FILM COATED ORAL 3 TIMES DAILY PRN
Qty: 9 TABLET | Refills: 0 | Status: SHIPPED | OUTPATIENT
Start: 2022-01-01 | End: 2022-01-01

## 2022-01-01 RX ORDER — EZETIMIBE 10 MG/1
10 TABLET ORAL NIGHTLY
Qty: 90 TABLET | Refills: 1 | Status: SHIPPED | OUTPATIENT
Start: 2022-01-01 | End: 2022-01-01 | Stop reason: SDUPTHER

## 2022-01-01 RX ORDER — BUMETANIDE 1 MG/1
1 TABLET ORAL DAILY
Qty: 3 TABLET | Refills: 0 | Status: SHIPPED | OUTPATIENT
Start: 2022-01-01 | End: 2022-01-01

## 2022-01-01 RX ORDER — CIPROFLOXACIN 500 MG/1
500 TABLET ORAL 2 TIMES DAILY
Qty: 20 TABLET | Refills: 0 | Status: SHIPPED | OUTPATIENT
Start: 2022-01-01 | End: 2023-01-01 | Stop reason: SDUPTHER

## 2022-01-01 RX ORDER — VALSARTAN 160 MG/1
160 TABLET ORAL DAILY
Qty: 90 TABLET | Refills: 1 | Status: SHIPPED | OUTPATIENT
Start: 2022-01-01 | End: 2023-01-01 | Stop reason: SDUPTHER

## 2022-01-01 RX ORDER — ALENDRONATE SODIUM 70 MG/1
TABLET ORAL
Qty: 12 TABLET | Refills: 4 | Status: SHIPPED | OUTPATIENT
Start: 2022-01-01

## 2022-01-01 RX ORDER — AMITRIPTYLINE HYDROCHLORIDE 25 MG/1
25 TABLET, FILM COATED ORAL 3 TIMES DAILY
Qty: 270 TABLET | Refills: 1 | Status: SHIPPED | OUTPATIENT
Start: 2022-01-01 | End: 2022-01-01 | Stop reason: SDUPTHER

## 2022-01-01 RX ORDER — FUROSEMIDE 20 MG/1
20 TABLET ORAL DAILY
COMMUNITY
Start: 2022-01-01 | End: 2022-01-01

## 2022-01-01 RX ORDER — CETIRIZINE HYDROCHLORIDE 10 MG/1
10 TABLET ORAL DAILY
COMMUNITY
End: 2022-01-01 | Stop reason: SDUPTHER

## 2022-01-01 RX ORDER — PROPOFOL 10 MG/ML
VIAL (ML) INTRAVENOUS
Status: DISCONTINUED | OUTPATIENT
Start: 2022-01-01 | End: 2022-01-01

## 2022-01-01 RX ORDER — AMOXICILLIN AND CLAVULANATE POTASSIUM 875; 125 MG/1; MG/1
1 TABLET, FILM COATED ORAL 2 TIMES DAILY
Qty: 20 TABLET | Refills: 0 | Status: SHIPPED | OUTPATIENT
Start: 2022-01-01 | End: 2022-01-01 | Stop reason: ALTCHOICE

## 2022-01-01 RX ORDER — PANTOPRAZOLE SODIUM 40 MG/1
40 TABLET, DELAYED RELEASE ORAL DAILY
Qty: 90 TABLET | Refills: 1 | Status: SHIPPED | OUTPATIENT
Start: 2022-01-01

## 2022-01-01 RX ORDER — HYDROCHLOROTHIAZIDE 25 MG/1
25 TABLET ORAL DAILY
Qty: 90 TABLET | Refills: 1 | Status: SHIPPED | OUTPATIENT
Start: 2022-01-01 | End: 2022-01-01

## 2022-01-01 RX ORDER — CEFUROXIME AXETIL 500 MG/1
500 TABLET ORAL 2 TIMES DAILY
Qty: 20 TABLET | Refills: 0 | Status: SHIPPED | OUTPATIENT
Start: 2022-01-01 | End: 2022-01-01

## 2022-01-01 RX ADMIN — PROPOFOL 30 MG: 10 INJECTION, EMULSION INTRAVENOUS at 07:06

## 2022-01-01 RX ADMIN — PROPOFOL 20 MG: 10 INJECTION, EMULSION INTRAVENOUS at 07:06

## 2022-01-01 RX ADMIN — SODIUM CHLORIDE: 0.9 INJECTION, SOLUTION INTRAVENOUS at 07:06

## 2022-01-01 RX ADMIN — PROPOFOL 50 MG: 10 INJECTION, EMULSION INTRAVENOUS at 07:06

## 2022-02-10 ENCOUNTER — OFFICE VISIT (OUTPATIENT)
Dept: UROGYNECOLOGY | Facility: CLINIC | Age: 77
End: 2022-02-10
Payer: MEDICARE

## 2022-02-10 VITALS
SYSTOLIC BLOOD PRESSURE: 158 MMHG | HEART RATE: 92 BPM | HEIGHT: 61 IN | DIASTOLIC BLOOD PRESSURE: 78 MMHG | BODY MASS INDEX: 49.95 KG/M2 | WEIGHT: 264.56 LBS

## 2022-02-10 DIAGNOSIS — N81.11 CYSTOCELE, MIDLINE: ICD-10-CM

## 2022-02-10 DIAGNOSIS — Z87.440 HISTORY OF RECURRENT UTI (URINARY TRACT INFECTION): ICD-10-CM

## 2022-02-10 DIAGNOSIS — Z46.89 PESSARY MAINTENANCE: ICD-10-CM

## 2022-02-10 DIAGNOSIS — R35.0 URINARY FREQUENCY: Primary | ICD-10-CM

## 2022-02-10 DIAGNOSIS — N95.2 ATROPHIC VAGINITIS: ICD-10-CM

## 2022-02-10 DIAGNOSIS — N39.8 VOIDING DYSFUNCTION: ICD-10-CM

## 2022-02-10 LAB
BILIRUB SERPL-MCNC: ABNORMAL MG/DL
BLOOD URINE, POC: NEGATIVE
CLARITY, POC UA: ABNORMAL
COLOR, POC UA: YELLOW
GLUCOSE UR QL STRIP: NEGATIVE
KETONES UR QL STRIP: NEGATIVE
LEUKOCYTE ESTERASE URINE, POC: NEGATIVE
NITRITE, POC UA: NEGATIVE
PH, POC UA: 5
PROTEIN, POC: ABNORMAL
SPECIFIC GRAVITY, POC UA: 1.02
UROBILINOGEN, POC UA: NORMAL

## 2022-02-10 PROCEDURE — 99999 PR PBB SHADOW E&M-EST. PATIENT-LVL IV: CPT | Mod: PBBFAC,,, | Performed by: NURSE PRACTITIONER

## 2022-02-10 PROCEDURE — 99214 OFFICE O/P EST MOD 30 MIN: CPT | Mod: PBBFAC,PO | Performed by: NURSE PRACTITIONER

## 2022-02-10 PROCEDURE — 81002 URINALYSIS NONAUTO W/O SCOPE: CPT | Mod: PBBFAC,PO | Performed by: NURSE PRACTITIONER

## 2022-02-10 PROCEDURE — 99213 PR OFFICE/OUTPT VISIT, EST, LEVL III, 20-29 MIN: ICD-10-PCS | Mod: S$PBB,,, | Performed by: NURSE PRACTITIONER

## 2022-02-10 PROCEDURE — 99999 PR PBB SHADOW E&M-EST. PATIENT-LVL IV: ICD-10-PCS | Mod: PBBFAC,,, | Performed by: NURSE PRACTITIONER

## 2022-02-10 PROCEDURE — 99213 OFFICE O/P EST LOW 20 MIN: CPT | Mod: S$PBB,,, | Performed by: NURSE PRACTITIONER

## 2022-02-10 NOTE — PROGRESS NOTES
Subjective:       Patient ID: Fransisca Frazier is a 76 y.o. female.    Chief Complaint: pessary maintenance    HPI  Fransisca Frazier is a 76 y.o. female who presents today for routine pessary maintenance.  She has been using the estring for awhile and feels that it works well for her.  At her last visit on 11/11/21 she was having some bladder irritation and she thought that it was in part, from stopping the oral elmiron.  She feels that her bladder has actually been doing OK.  She states that her urine is a little dark today because she has been fasting because she is leaving here to go do lab work.  Other than this she is having some problems with leakage when her bladder is full.  She states she just starts peeing all over herself.  She is wondering if there is anything that can be done about this.  She is also questioning pelvic floor strengthening devices.      Review of Systems   Constitutional: Negative for activity change, fever and unexpected weight change.   HENT: Negative for hearing loss.    Eyes: Negative for visual disturbance.   Respiratory: Negative for shortness of breath and wheezing.    Cardiovascular: Negative for chest pain, palpitations and leg swelling.   Gastrointestinal: Negative for abdominal pain, constipation and diarrhea.   Genitourinary: Positive for frequency and urgency. Negative for dyspareunia, dysuria, vaginal bleeding and vaginal discharge.   Musculoskeletal: Negative for gait problem and neck pain.   Skin: Negative for rash and wound.   Allergic/Immunologic: Negative for immunocompromised state.   Neurological: Negative for tremors, speech difficulty and weakness.   Hematological: Does not bruise/bleed easily.   Psychiatric/Behavioral: Negative for agitation and confusion.       Objective:      Physical Exam  Constitutional:       General: She is not in acute distress.     Appearance: She is well-developed and well-nourished.   HENT:      Head: Normocephalic and atraumatic.   Neck:       Thyroid: No thyromegaly.   Pulmonary:      Effort: Pulmonary effort is normal. No respiratory distress.   Abdominal:      Palpations: Abdomen is soft.      Tenderness: There is no abdominal tenderness.      Hernia: No hernia is present.   Musculoskeletal:         General: Normal range of motion.      Cervical back: Neck supple.   Skin:     General: Skin is warm and dry.      Findings: No rash.   Neurological:      Mental Status: She is alert and oriented to person, place, and time.   Psychiatric:         Mood and Affect: Mood and affect normal.         Behavior: Behavior normal.       Pelvic Exam:  V: No lesions. No palpable nodes.   Va: no discharge or bleeding.  Good length  Dominant midline cystocele Ba=0  Meatus:No caruncle or stenosis  Urethra: Non tender. No suburethral masses.  Cx/Cuff: Normal   Uterus: small, non-tender  Ad: No mass or tenderness.  Levators :Symmetrical. Normal tone. Non tender.  BL: Non tender  RV: No hemorrhoids.      Assessment:       1. Urinary frequency    2. Voiding dysfunction    3. Cystocele, midline    4. Pessary maintenance    5. Atrophic vaginitis    6. History of recurrent UTI (urinary tract infection)          Procedure note- estring  pessary removed with forceps and discarded.  After betadine irrigation of the vagina, a new estring pessary, which the pt provided, was reinserted into the vagina.  Pt ambulated in the room and denies any discomfort.  NP reminded pt that the first 24-48 hours are the most likely time for the pessary to fall out and to monitor the toilet before flushing.  Pt verbalized understanding.      Plan:       Urinary frequency- monitor  -     POCT urine dipstick without microscope    Voiding dysfunction- NP encouraged pt to think about pelvic floor PT.  Pt will think about this.    Cystocele, midline- continue with estring    Pessary maintenance- as noted above    Atrophic vaginitis- as noted above    History of recurrent UTI (urinary tract infection)-  monitor    RTC 3 months or PRN

## 2022-02-11 LAB
ALBUMIN SERPL-MCNC: 4.2 G/DL (ref 3.6–5.1)
ALBUMIN/GLOB SERPL: 1.2 (CALC) (ref 1–2.5)
ALP SERPL-CCNC: 71 U/L (ref 37–153)
ALT SERPL-CCNC: 22 U/L (ref 6–29)
AST SERPL-CCNC: 30 U/L (ref 10–35)
BASOPHILS # BLD AUTO: 47 CELLS/UL (ref 0–200)
BASOPHILS NFR BLD AUTO: 0.6 %
BILIRUB SERPL-MCNC: 0.4 MG/DL (ref 0.2–1.2)
BUN SERPL-MCNC: 20 MG/DL (ref 7–25)
BUN/CREAT SERPL: 15 (CALC) (ref 6–22)
CALCIUM SERPL-MCNC: 10.2 MG/DL (ref 8.6–10.4)
CHLORIDE SERPL-SCNC: 97 MMOL/L (ref 98–110)
CO2 SERPL-SCNC: 30 MMOL/L (ref 20–32)
CREAT SERPL-MCNC: 1.32 MG/DL (ref 0.6–0.93)
EOSINOPHIL # BLD AUTO: 237 CELLS/UL (ref 15–500)
EOSINOPHIL NFR BLD AUTO: 3 %
ERYTHROCYTE [DISTWIDTH] IN BLOOD BY AUTOMATED COUNT: 12.9 % (ref 11–15)
GLOBULIN SER CALC-MCNC: 3.4 G/DL (CALC) (ref 1.9–3.7)
GLUCOSE SERPL-MCNC: 112 MG/DL (ref 65–99)
HBA1C MFR BLD: 6 % OF TOTAL HGB
HCT VFR BLD AUTO: 43.6 % (ref 35–45)
HGB BLD-MCNC: 14.6 G/DL (ref 11.7–15.5)
LYMPHOCYTES # BLD AUTO: 1288 CELLS/UL (ref 850–3900)
LYMPHOCYTES NFR BLD AUTO: 16.3 %
MCH RBC QN AUTO: 33.3 PG (ref 27–33)
MCHC RBC AUTO-ENTMCNC: 33.5 G/DL (ref 32–36)
MCV RBC AUTO: 99.3 FL (ref 80–100)
MONOCYTES # BLD AUTO: 1003 CELLS/UL (ref 200–950)
MONOCYTES NFR BLD AUTO: 12.7 %
NEUTROPHILS # BLD AUTO: 5325 CELLS/UL (ref 1500–7800)
NEUTROPHILS NFR BLD AUTO: 67.4 %
PLATELET # BLD AUTO: 311 THOUSAND/UL (ref 140–400)
PMV BLD REES-ECKER: 11.8 FL (ref 7.5–12.5)
POTASSIUM SERPL-SCNC: 4.4 MMOL/L (ref 3.5–5.3)
PROT SERPL-MCNC: 7.6 G/DL (ref 6.1–8.1)
RBC # BLD AUTO: 4.39 MILLION/UL (ref 3.8–5.1)
SODIUM SERPL-SCNC: 137 MMOL/L (ref 135–146)
WBC # BLD AUTO: 7.9 THOUSAND/UL (ref 3.8–10.8)

## 2022-02-12 LAB
ALBUMIN/CREAT UR: 5 MCG/MG CREAT
CREAT UR-MCNC: 189 MG/DL (ref 20–275)
MICROALBUMIN UR-MCNC: 0.9 MG/DL

## 2022-02-20 NOTE — PROGRESS NOTES
SUBJECTIVE:    Patient ID: Fransisca Frazier is a 76 y.o. female.    Chief Complaint: In for follow-up    HPI     In for follow-up labs and status-    A1C 6.0 urine neg for protein    Elevated creatinine-we can try to decrease diuretic and pantoprazole to three times a week and let me know if there is an issue-    Patient continues to gain weight-her activity is limited-she has a recumbent bicycle and doesn't use it    Office Visit on 02/10/2022   Component Date Value Ref Range Status    Color, UA 02/10/2022 Yellow   Final    pH, UA 02/10/2022 5   Final    WBC, UA 02/10/2022 negative   Final    Nitrite, UA 02/10/2022 negative   Final    Protein, POC 02/10/2022 trace   Final    Glucose, UA 02/10/2022 negative   Final    Ketones, UA 02/10/2022 negative   Final    Urobilinogen, UA 02/10/2022 normal   Final    Bilirubin, POC 02/10/2022 2+   Final    Blood, UA 02/10/2022 negative   Final    Clarity, UA 02/10/2022 Slightly Cloudy   Final    Spec Grav UA 02/10/2022 1.020   Final   Office Visit on 11/11/2021   Component Date Value Ref Range Status    Color, UA 11/11/2021 Yellow   Final    pH, UA 11/11/2021 6   Final    WBC, UA 11/11/2021 negative   Final    Nitrite, UA 11/11/2021 negative   Final    Protein, POC 11/11/2021 negative   Final    Glucose, UA 11/11/2021 negative   Final    Ketones, UA 11/11/2021 negative   Final    Urobilinogen, UA 11/11/2021 negative   Final    Bilirubin, POC 11/11/2021 negative   Final    Blood, UA 11/11/2021 negative   Final    Clarity, UA 11/11/2021 Clear   Final    Spec Grav UA 11/11/2021 1.010   Final   Office Visit on 08/10/2021   Component Date Value Ref Range Status    Color, UA 08/10/2021 Yellow   Final    pH, UA 08/10/2021 7   Final    WBC, UA 08/10/2021 trace   Final    Nitrite, UA 08/10/2021 negative   Final    Protein, POC 08/10/2021 negative   Final    Glucose, UA 08/10/2021 normal   Final    Ketones, UA 08/10/2021 negative   Final    Urobilinogen,  UA 08/10/2021 normal   Final    Bilirubin, POC 08/10/2021 negative   Final    Blood, UA 08/10/2021 negative   Final    Clarity, UA 08/10/2021 Slightly Cloudy   Final    Spec Grav UA 08/10/2021 1.005   Final    Urine Culture, Routine 08/10/2021 No growth   Final   Office Visit on 08/05/2021   Component Date Value Ref Range Status    Hemoglobin A1C 02/10/2022 6.0 (A) <5.7 % of total Hgb Final    Creatinine, Urine 02/11/2022 189  20 - 275 mg/dL Final    Microalb, Ur 02/11/2022 0.9  See Note: mg/dL Final    Microalb/Creat Ratio 02/11/2022 5  <30 mcg/mg creat Final    WBC 02/10/2022 7.9  3.8 - 10.8 Thousand/uL Final    RBC 02/10/2022 4.39  3.80 - 5.10 Million/uL Final    Hemoglobin 02/10/2022 14.6  11.7 - 15.5 g/dL Final    Hematocrit 02/10/2022 43.6  35.0 - 45.0 % Final    MCV 02/10/2022 99.3  80.0 - 100.0 fL Final    MCH 02/10/2022 33.3 (A) 27.0 - 33.0 pg Final    MCHC 02/10/2022 33.5  32.0 - 36.0 g/dL Final    RDW 02/10/2022 12.9  11.0 - 15.0 % Final    Platelets 02/10/2022 311  140 - 400 Thousand/uL Final    MPV 02/10/2022 11.8  7.5 - 12.5 fL Final    Neutrophils, Abs 02/10/2022 5,325  1,500 - 7,800 cells/uL Final    Lymph # 02/10/2022 1,288  850 - 3,900 cells/uL Final    Mono # 02/10/2022 1,003 (A) 200 - 950 cells/uL Final    Eos # 02/10/2022 237  15 - 500 cells/uL Final    Baso # 02/10/2022 47  0 - 200 cells/uL Final    Neutrophils Relative 02/10/2022 67.4  % Final    Lymph % 02/10/2022 16.3  % Final    Mono % 02/10/2022 12.7  % Final    Eosinophil % 02/10/2022 3.0  % Final    Basophil % 02/10/2022 0.6  % Final    Glucose 02/10/2022 112 (A) 65 - 99 mg/dL Final    BUN 02/10/2022 20  7 - 25 mg/dL Final    Creatinine 02/10/2022 1.32 (A) 0.60 - 0.93 mg/dL Final    eGFR if non  02/10/2022 39 (A) > OR = 60 mL/min/1.73m2 Final    eGFR if  02/10/2022 45 (A) > OR = 60 mL/min/1.73m2 Final    BUN/Creatinine Ratio 02/10/2022 15  6 - 22 (calc) Final    Sodium  02/10/2022 137  135 - 146 mmol/L Final    Potassium 02/10/2022 4.4  3.5 - 5.3 mmol/L Final    Chloride 02/10/2022 97 (A) 98 - 110 mmol/L Final    CO2 02/10/2022 30  20 - 32 mmol/L Final    Calcium 02/10/2022 10.2  8.6 - 10.4 mg/dL Final    Total Protein 02/10/2022 7.6  6.1 - 8.1 g/dL Final    Albumin 02/10/2022 4.2  3.6 - 5.1 g/dL Final    Globulin, Total 02/10/2022 3.4  1.9 - 3.7 g/dL (calc) Final    Albumin/Globulin Ratio 02/10/2022 1.2  1.0 - 2.5 (calc) Final    Total Bilirubin 02/10/2022 0.4  0.2 - 1.2 mg/dL Final    Alkaline Phosphatase 02/10/2022 71  37 - 153 U/L Final    AST 02/10/2022 30  10 - 35 U/L Final    ALT 02/10/2022 22  6 - 29 U/L Final   Office Visit on 05/11/2021   Component Date Value Ref Range Status    Color, UA 05/11/2021 Yellow   Final    pH, UA 05/11/2021 6   Final    WBC, UA 05/11/2021 negative   Final    Nitrite, UA 05/11/2021 negative   Final    Protein, POC 05/11/2021 negative   Final    Glucose, UA 05/11/2021 negative   Final    Ketones, UA 05/11/2021 negative   Final    Urobilinogen, UA 05/11/2021 negative   Final    Bilirubin, POC 05/11/2021 negative   Final    Blood, UA 05/11/2021 negative   Final    Clarity, UA 05/11/2021 Clear   Final    Spec Grav UA 05/11/2021 1.005   Final       Past Medical History:   Diagnosis Date    Back pain     Cystitis, interstitial     Hypertension     Uncontrolled type 2 diabetes mellitus without complication, without long-term current use of insulin 1/22/2018    Wears glasses     Wears partial dentures     upper     Past Surgical History:   Procedure Laterality Date    CHOLECYSTECTOMY  02/21/2018    CYSTOSCOPY      D & C      DILATION AND CURETTAGE OF UTERUS      disk repair      L5 S1    EYE SURGERY      bilat cataract     Family History   Problem Relation Age of Onset    Cancer Father     Cancer Sister     Cancer Brother     Cancer Brother        Marital Status:   Alcohol History:  reports current alcohol  use.  Tobacco History:  reports that she quit smoking about 28 years ago. Her smoking use included cigarettes. She has a 32.00 pack-year smoking history. She has never used smokeless tobacco.  Drug History:  reports no history of drug use.    Review of patient's allergies indicates:  No Known Allergies    Current Outpatient Medications:     alendronate (FOSAMAX) 70 MG tablet, TAKE 1 TABLET EVERY 7 DAYS, Disp: 12 tablet, Rfl: 4    bisacodyL (DULCOLAX) 5 mg EC tablet, , Disp: , Rfl:     calcium carbonate (TUMS) 200 mg calcium (500 mg) chewable tablet, , Disp: , Rfl:     cetirizine (ZYRTEC) 10 MG tablet, Take 1 tablet (10 mg total) by mouth once daily., Disp: 90 tablet, Rfl: 2    docusate sodium (COLACE) 100 MG capsule, , Disp: , Rfl:     estradioL (ESTRING) 2 mg (7.5 mcg /24 hour) vaginal ring, Place 2 mg vaginally every 3 (three) months. for 90 doses, Disp: 1 each, Rfl: 3    glucosamine-chondroit-vit C-Mn 500-400 mg capsule, , Disp: , Rfl:     hydrocortisone 2.5 % cream, aaa once every other day alternating with triamcinolone, Disp: 30 g, Rfl: 1    oxymetazoline (RHOFADE) 1 % Crea, Apply face qday, Disp: 30 g, Rfl: 11    potassium chloride (KLOR-CON) 10 MEQ TbSR, Take 10 mEq by mouth once daily., Disp: , Rfl:     sucralfate (CARAFATE) 1 gram tablet, Take 1 tablet (1 g total) by mouth 4 (four) times daily., Disp: 360 tablet, Rfl: 1    timolol maleate 0.5% (TIMOPTIC) 0.5 % Drop, Place 1 drop into both eyes 2 (two) times daily., Disp: , Rfl:     amitriptyline (ELAVIL) 25 MG tablet, Take 1 tablet (25 mg total) by mouth 3 (three) times daily., Disp: 270 tablet, Rfl: 1    ezetimibe (ZETIA) 10 mg tablet, Take 1 tablet (10 mg total) by mouth every evening., Disp: 90 tablet, Rfl: 1    hydroCHLOROthiazide (HYDRODIURIL) 25 MG tablet, Take 1 tablet (25 mg total) by mouth once daily., Disp: 90 tablet, Rfl: 1    hyoscyamine (OSCIMIN SL) 0.125 mg Subl, DISSOLVE 1 TABLET UNDER THE TONGUE THREE TIMES A DAY AS NEEDED,  Disp: 270 tablet, Rfl: 1    nystatin-triamcinolone (MYCOLOG II) cream, Apply topically 4 (four) times daily. for 10 days (Patient not taking: Reported on 2/21/2022), Disp: 60 g, Rfl: 1    pantoprazole (PROTONIX) 40 MG tablet, Take 1 tablet (40 mg total) by mouth once daily., Disp: 90 tablet, Rfl: 1    valsartan (DIOVAN) 160 MG tablet, Take 1 tablet (160 mg total) by mouth once daily., Disp: 90 tablet, Rfl: 1    Review of Systems   Constitutional: Negative for activity change, appetite change, chills, diaphoresis, fatigue, fever and unexpected weight change.   HENT: Negative for congestion, ear pain, hearing loss, nosebleeds, postnasal drip, rhinorrhea, sinus pressure, sinus pain, sneezing, sore throat, tinnitus, trouble swallowing and voice change.    Eyes: Negative for photophobia, pain, discharge, itching and visual disturbance.   Respiratory: Negative for apnea, cough, chest tightness, shortness of breath, wheezing and stridor.    Cardiovascular: Negative for chest pain, palpitations and leg swelling.   Gastrointestinal: Negative for abdominal distention, abdominal pain, blood in stool, constipation, diarrhea, nausea and vomiting.   Endocrine: Negative for cold intolerance, heat intolerance, polydipsia and polyuria.   Genitourinary: Negative for difficulty urinating, dyspareunia, dysuria, flank pain, frequency, hematuria, menstrual problem, pelvic pain, urgency, vaginal discharge and vaginal pain.        Worsening leaking of the bladder, now sometimes spontaneous particularly with full bladder   Musculoskeletal: Positive for back pain (right side). Negative for arthralgias, joint swelling, myalgias, neck pain and neck stiffness.   Skin: Negative for pallor.   Allergic/Immunologic: Negative for environmental allergies and food allergies.   Neurological: Negative for dizziness, tremors, speech difficulty, weakness, light-headedness, numbness and headaches.   Hematological: Does not bruise/bleed easily.  "  Psychiatric/Behavioral: Negative for agitation, confusion, decreased concentration, dysphoric mood, sleep disturbance and suicidal ideas. The patient is not nervous/anxious.           Objective:      Vitals:    02/21/22 0945   BP: 130/68   Pulse: 66   Resp: 14   SpO2: 97%   Weight: 122.5 kg (270 lb)   Height: 5' 1" (1.549 m)     Physical Exam  Vitals and nursing note reviewed.   Constitutional:       Appearance: Normal appearance. She is obese.   HENT:      Head: Normocephalic and atraumatic.   Eyes:      Extraocular Movements: Extraocular movements intact.      Pupils: Pupils are equal, round, and reactive to light.   Neck:      Vascular: No carotid bruit.   Cardiovascular:      Rate and Rhythm: Normal rate and regular rhythm.      Pulses: Normal pulses.      Heart sounds: Normal heart sounds.   Pulmonary:      Effort: Pulmonary effort is normal.      Breath sounds: Normal breath sounds.   Abdominal:      Comments: Obese abdomen   Musculoskeletal:         General: Normal range of motion.      Cervical back: No rigidity or tenderness.   Lymphadenopathy:      Cervical: No cervical adenopathy.   Skin:     General: Skin is warm and dry.   Neurological:      General: No focal deficit present.      Mental Status: She is alert and oriented to person, place, and time.   Psychiatric:         Mood and Affect: Mood normal.         Thought Content: Thought content normal.           Assessment:       1. Hyperlipidemia, unspecified hyperlipidemia type    2. Primary hypertension    3. Prediabetes    4. Encounter for screening mammogram for breast cancer    5. Menopause    6. Encounter for osteoporosis screening in asymptomatic postmenopausal patient    7. Hypertension, unspecified type    8. Pure hypercholesterolemia    9. GERD without esophagitis    10. Interstitial cystitis    11. BMI 50.0-59.9, adult         Plan:       Hyperlipidemia, unspecified hyperlipidemia type  -     Lipid Panel; Future; Expected date: " 08/15/2022    Primary hypertension  -     Comprehensive Metabolic Panel; Future; Expected date: 08/15/2022  -     CBC Auto Differential; Future; Expected date: 08/15/2022  -     Basic Metabolic Panel; Future; Expected date: 04/21/2022    Prediabetes  -     Comprehensive Metabolic Panel; Future; Expected date: 08/15/2022  -     Hemoglobin A1C; Future; Expected date: 08/15/2022    Encounter for screening mammogram for breast cancer  -     Mammo Digital Screening Bilat; Future; Expected date: 08/12/2022    Menopause  -     DXA Bone Density Spine And Hip; Future; Expected date: 08/09/2022    Encounter for osteoporosis screening in asymptomatic postmenopausal patient  -     DXA Bone Density Spine And Hip; Future; Expected date: 08/09/2022    Hypertension, unspecified type  -     valsartan (DIOVAN) 160 MG tablet; Take 1 tablet (160 mg total) by mouth once daily.  Dispense: 90 tablet; Refill: 1  -     hydroCHLOROthiazide (HYDRODIURIL) 25 MG tablet; Take 1 tablet (25 mg total) by mouth once daily.  Dispense: 90 tablet; Refill: 1    Pure hypercholesterolemia  -     ezetimibe (ZETIA) 10 mg tablet; Take 1 tablet (10 mg total) by mouth every evening.  Dispense: 90 tablet; Refill: 1    GERD without esophagitis  -     pantoprazole (PROTONIX) 40 MG tablet; Take 1 tablet (40 mg total) by mouth once daily.  Dispense: 90 tablet; Refill: 1    Interstitial cystitis  -     amitriptyline (ELAVIL) 25 MG tablet; Take 1 tablet (25 mg total) by mouth 3 (three) times daily.  Dispense: 270 tablet; Refill: 1  -     hyoscyamine (OSCIMIN SL) 0.125 mg Subl; DISSOLVE 1 TABLET UNDER THE TONGUE THREE TIMES A DAY AS NEEDED  Dispense: 270 tablet; Refill: 1    BMI 50.0-59.9, adult      Follow up in about 3 months (around 5/21/2022) for FOLLOW UP LABS, FOLLOW-UP STATUS, FOLLOW UP MEDICATIONS.        2/21/2022 Elda Paredes M.D.

## 2022-02-21 ENCOUNTER — OFFICE VISIT (OUTPATIENT)
Dept: FAMILY MEDICINE | Facility: CLINIC | Age: 77
End: 2022-02-21
Payer: MEDICARE

## 2022-02-21 VITALS
WEIGHT: 270 LBS | BODY MASS INDEX: 50.98 KG/M2 | HEART RATE: 66 BPM | SYSTOLIC BLOOD PRESSURE: 130 MMHG | OXYGEN SATURATION: 97 % | DIASTOLIC BLOOD PRESSURE: 68 MMHG | HEIGHT: 61 IN | RESPIRATION RATE: 14 BRPM

## 2022-02-21 DIAGNOSIS — Z78.0 MENOPAUSE: ICD-10-CM

## 2022-02-21 DIAGNOSIS — Z78.0 ENCOUNTER FOR OSTEOPOROSIS SCREENING IN ASYMPTOMATIC POSTMENOPAUSAL PATIENT: ICD-10-CM

## 2022-02-21 DIAGNOSIS — Z12.31 ENCOUNTER FOR SCREENING MAMMOGRAM FOR BREAST CANCER: ICD-10-CM

## 2022-02-21 DIAGNOSIS — I10 HYPERTENSION, UNSPECIFIED TYPE: ICD-10-CM

## 2022-02-21 DIAGNOSIS — I10 PRIMARY HYPERTENSION: ICD-10-CM

## 2022-02-21 DIAGNOSIS — E78.5 HYPERLIPIDEMIA, UNSPECIFIED HYPERLIPIDEMIA TYPE: Primary | ICD-10-CM

## 2022-02-21 DIAGNOSIS — Z13.820 ENCOUNTER FOR OSTEOPOROSIS SCREENING IN ASYMPTOMATIC POSTMENOPAUSAL PATIENT: ICD-10-CM

## 2022-02-21 DIAGNOSIS — R73.03 PREDIABETES: ICD-10-CM

## 2022-02-21 DIAGNOSIS — E78.00 PURE HYPERCHOLESTEROLEMIA: ICD-10-CM

## 2022-02-21 DIAGNOSIS — N30.10 INTERSTITIAL CYSTITIS: ICD-10-CM

## 2022-02-21 DIAGNOSIS — K21.9 GERD WITHOUT ESOPHAGITIS: ICD-10-CM

## 2022-02-21 PROCEDURE — 99214 OFFICE O/P EST MOD 30 MIN: CPT | Mod: S$PBB,AQ,, | Performed by: INTERNAL MEDICINE

## 2022-02-21 PROCEDURE — 99214 PR OFFICE/OUTPT VISIT, EST, LEVL IV, 30-39 MIN: ICD-10-PCS | Mod: S$PBB,AQ,, | Performed by: INTERNAL MEDICINE

## 2022-02-21 PROCEDURE — 99215 OFFICE O/P EST HI 40 MIN: CPT | Performed by: INTERNAL MEDICINE

## 2022-02-21 RX ORDER — HYOSCYAMINE SULFATE 0.12 MG/1
TABLET SUBLINGUAL
Qty: 270 TABLET | Refills: 1 | Status: SHIPPED | OUTPATIENT
Start: 2022-02-21

## 2022-02-21 RX ORDER — EZETIMIBE 10 MG/1
10 TABLET ORAL NIGHTLY
Qty: 90 TABLET | Refills: 1 | Status: SHIPPED | OUTPATIENT
Start: 2022-02-21 | End: 2022-01-01 | Stop reason: SDUPTHER

## 2022-02-21 RX ORDER — PANTOPRAZOLE SODIUM 40 MG/1
40 TABLET, DELAYED RELEASE ORAL DAILY
Qty: 90 TABLET | Refills: 1 | Status: SHIPPED | OUTPATIENT
Start: 2022-02-21 | End: 2022-01-01 | Stop reason: SDUPTHER

## 2022-02-21 RX ORDER — VALSARTAN 160 MG/1
160 TABLET ORAL DAILY
Qty: 90 TABLET | Refills: 1 | Status: SHIPPED | OUTPATIENT
Start: 2022-02-21 | End: 2022-01-01 | Stop reason: SDUPTHER

## 2022-02-21 RX ORDER — HYDROCHLOROTHIAZIDE 25 MG/1
25 TABLET ORAL DAILY
Qty: 90 TABLET | Refills: 1 | Status: SHIPPED | OUTPATIENT
Start: 2022-02-21 | End: 2022-01-01 | Stop reason: SDUPTHER

## 2022-02-21 RX ORDER — AMITRIPTYLINE HYDROCHLORIDE 25 MG/1
25 TABLET, FILM COATED ORAL 3 TIMES DAILY
Qty: 270 TABLET | Refills: 1 | Status: SHIPPED | OUTPATIENT
Start: 2022-02-21 | End: 2022-01-01 | Stop reason: SDUPTHER

## 2022-03-07 ENCOUNTER — PATIENT MESSAGE (OUTPATIENT)
Dept: FAMILY MEDICINE | Facility: CLINIC | Age: 77
End: 2022-03-07
Payer: MEDICARE

## 2022-04-22 ENCOUNTER — LAB VISIT (OUTPATIENT)
Dept: LAB | Facility: HOSPITAL | Age: 77
End: 2022-04-22
Attending: INTERNAL MEDICINE
Payer: MEDICARE

## 2022-04-22 DIAGNOSIS — I10 PRIMARY HYPERTENSION: ICD-10-CM

## 2022-04-22 DIAGNOSIS — R73.03 PREDIABETES: ICD-10-CM

## 2022-04-22 LAB
ANION GAP SERPL CALC-SCNC: 10 MMOL/L (ref 8–16)
BUN SERPL-MCNC: 22 MG/DL (ref 8–23)
CALCIUM SERPL-MCNC: 9.5 MG/DL (ref 8.7–10.5)
CHLORIDE SERPL-SCNC: 103 MMOL/L (ref 95–110)
CO2 SERPL-SCNC: 24 MMOL/L (ref 23–29)
CREAT SERPL-MCNC: 1 MG/DL (ref 0.5–1.4)
EST. GFR  (AFRICAN AMERICAN): >60 ML/MIN/1.73 M^2
EST. GFR  (NON AFRICAN AMERICAN): 54.1 ML/MIN/1.73 M^2
GLUCOSE SERPL-MCNC: 114 MG/DL (ref 70–110)
POTASSIUM SERPL-SCNC: 3.9 MMOL/L (ref 3.5–5.1)
SODIUM SERPL-SCNC: 137 MMOL/L (ref 136–145)

## 2022-04-22 PROCEDURE — 36415 COLL VENOUS BLD VENIPUNCTURE: CPT | Performed by: INTERNAL MEDICINE

## 2022-04-22 PROCEDURE — 80048 BASIC METABOLIC PNL TOTAL CA: CPT | Performed by: INTERNAL MEDICINE

## 2022-05-12 NOTE — PROGRESS NOTES
Subjective:       Patient ID: Fransisca Frazier is a 76 y.o. female.    Chief Complaint: 3 mth estring check    HPI  Fransisca Frazier is a 76 y.o. female who presents today for routine estring change.  She has been doing well the past 3 months.  She denies any real vaginal complaints/concerns.  She did notice some blood when she was wiping today to give a urine specimen and thinks she scratched herself with her fingernail.  She has not seen any blood prior to this one episode.  She has been having some urinary frequency but is taking her fluid pills.  She was also having some constipation so she took ducolax and has been having frequent BM the past 2 days.  Her UA today is negative.  She has a followup appt with her PCP later this month to keep an eye on her kidneys and she also sees Dr. Morgan.  She denies any other acute complaints/concerns at this time.    Review of Systems   Constitutional: Negative for activity change, fever and unexpected weight change.   HENT: Negative for hearing loss.    Eyes: Negative for visual disturbance.   Respiratory: Negative for shortness of breath and wheezing.    Cardiovascular: Negative for chest pain, palpitations and leg swelling.   Gastrointestinal: Negative for abdominal pain, constipation and diarrhea.   Genitourinary: Positive for frequency and urgency. Negative for dyspareunia, dysuria, vaginal bleeding and vaginal discharge.   Musculoskeletal: Negative for gait problem and neck pain.   Skin: Negative for rash and wound.   Allergic/Immunologic: Negative for immunocompromised state.   Neurological: Negative for tremors, speech difficulty and weakness.   Hematological: Does not bruise/bleed easily.   Psychiatric/Behavioral: Negative for agitation and confusion.       Objective:      Physical Exam  Constitutional:       General: She is not in acute distress.     Appearance: She is well-developed.   HENT:      Head: Normocephalic and atraumatic.   Neck:      Thyroid: No  thyromegaly.   Pulmonary:      Effort: Pulmonary effort is normal. No respiratory distress.   Abdominal:      Palpations: Abdomen is soft.      Tenderness: There is no abdominal tenderness.      Hernia: No hernia is present.   Musculoskeletal:         General: Normal range of motion.      Cervical back: Neck supple.   Skin:     General: Skin is warm and dry.      Findings: No rash.   Neurological:      Mental Status: She is alert and oriented to person, place, and time.   Psychiatric:         Behavior: Behavior normal.       Pelvic Exam:  V: No lesions. No palpable nodes. Very small scratch noted on the labia minora on the right side near the urethra  Va: no discharge or bleeding.  Good length.  Dominant midline cystocele Ba=0  Meatus:No caruncle or stenosis  Urethra: Non tender. No suburethral masses.  Cx/Cuff: Normal   Uterus: small, non-tender  Ad: No mass or tenderness.  Levators :Symmetrical. Normal tone. Non tender.  BL: Non tender  RV: No hemorrhoids.      Assessment:       1. Urinary frequency    2. Voiding dysfunction    3. Cystocele, midline    4. Pessary maintenance    5. Atrophic vaginitis          Procedure note- estring pessary removed with forceps and discarded.   After betadine irrigation of the vagina, a new estring pessary, which the pt provided,  was reinserted into the vagina.  Pt ambulated in the room and denies any discomfort.  NP reminded pt that the first 24-48 hours are the most likely time for the pessary to fall out and to monitor the toilet before flushing.  Pt verbalized understanding.      Plan:       Urinary frequency- monitor  -     POCT URINE DIPSTICK WITHOUT MICROSCOPE    Voiding dysfunction- monitor    Cystocele, midline- continue with estring    Pessary maintenance- continue with estring    Atrophic vaginitis- continue with estring    RTC 3 months

## 2022-05-23 NOTE — PROGRESS NOTES
"  SUBJECTIVE:    Patient ID: Fransisca Frazier is a 76 y.o. female.    Chief Complaint: Diabetes, Medication Refill, and Follow-up    HPI     In for labs and refills:    Labs-Na 137 K 3.9 Glu 114 BUN 22 Cr 1.0    Patient having bad pain in the RUQ under the rib cage sometimes comes around to the shoulder for the last month-made worse if she eats any thing but lightly she gets pain radiating through the body to the back-carafate makes its better with the pantoprazole she has less pain but if she eats anything other than light she will have pain-    She is also tender in the mid right abdomen for the last two days associated with soreness-no pain unless she "mashes it"-recent loose stools but she took 3 dulcolax for 4 day stool abstinence    Blood sugar-last 114    Office Visit on 05/12/2022   Component Date Value Ref Range Status    Color, UA 05/12/2022 Yellow   Final    pH, UA 05/12/2022 5   Final    WBC, UA 05/12/2022 neg   Final    Nitrite, UA 05/12/2022 neg   Final    Protein, POC 05/12/2022 neg   Final    Glucose, UA 05/12/2022 neg   Final    Ketones, UA 05/12/2022 neg   Final    Urobilinogen, UA 05/12/2022 0.2   Final    Bilirubin, POC 05/12/2022 neg   Final    Blood, UA 05/12/2022 neg   Final    Clarity, UA 05/12/2022 Clear   Final    Spec Grav UA 05/12/2022 1.015   Final   Lab Visit on 04/22/2022   Component Date Value Ref Range Status    Sodium 04/22/2022 137  136 - 145 mmol/L Final    Potassium 04/22/2022 3.9  3.5 - 5.1 mmol/L Final    Chloride 04/22/2022 103  95 - 110 mmol/L Final    CO2 04/22/2022 24  23 - 29 mmol/L Final    Glucose 04/22/2022 114 (A) 70 - 110 mg/dL Final    BUN 04/22/2022 22  8 - 23 mg/dL Final    Creatinine 04/22/2022 1.0  0.5 - 1.4 mg/dL Final    Calcium 04/22/2022 9.5  8.7 - 10.5 mg/dL Final    Anion Gap 04/22/2022 10  8 - 16 mmol/L Final    eGFR if African American 04/22/2022 >60.0  >60 mL/min/1.73 m^2 Final    eGFR if non African American 04/22/2022 54.1 (A) >60 " mL/min/1.73 m^2 Final   Office Visit on 02/10/2022   Component Date Value Ref Range Status    Color, UA 02/10/2022 Yellow   Final    pH, UA 02/10/2022 5   Final    WBC, UA 02/10/2022 negative   Final    Nitrite, UA 02/10/2022 negative   Final    Protein, POC 02/10/2022 trace   Final    Glucose, UA 02/10/2022 negative   Final    Ketones, UA 02/10/2022 negative   Final    Urobilinogen, UA 02/10/2022 normal   Final    Bilirubin, POC 02/10/2022 2+   Final    Blood, UA 02/10/2022 negative   Final    Clarity, UA 02/10/2022 Slightly Cloudy   Final    Spec Grav UA 02/10/2022 1.020   Final   Office Visit on 11/11/2021   Component Date Value Ref Range Status    Color, UA 11/11/2021 Yellow   Final    pH, UA 11/11/2021 6   Final    WBC, UA 11/11/2021 negative   Final    Nitrite, UA 11/11/2021 negative   Final    Protein, POC 11/11/2021 negative   Final    Glucose, UA 11/11/2021 negative   Final    Ketones, UA 11/11/2021 negative   Final    Urobilinogen, UA 11/11/2021 negative   Final    Bilirubin, POC 11/11/2021 negative   Final    Blood, UA 11/11/2021 negative   Final    Clarity, UA 11/11/2021 Clear   Final    Spec Grav UA 11/11/2021 1.010   Final   Office Visit on 08/10/2021   Component Date Value Ref Range Status    Color, UA 08/10/2021 Yellow   Final    pH, UA 08/10/2021 7   Final    WBC, UA 08/10/2021 trace   Final    Nitrite, UA 08/10/2021 negative   Final    Protein, POC 08/10/2021 negative   Final    Glucose, UA 08/10/2021 normal   Final    Ketones, UA 08/10/2021 negative   Final    Urobilinogen, UA 08/10/2021 normal   Final    Bilirubin, POC 08/10/2021 negative   Final    Blood, UA 08/10/2021 negative   Final    Clarity, UA 08/10/2021 Slightly Cloudy   Final    Spec Grav UA 08/10/2021 1.005   Final    Urine Culture, Routine 08/10/2021 No growth   Final   Office Visit on 08/05/2021   Component Date Value Ref Range Status    Hemoglobin A1C 02/10/2022 6.0 (A) <5.7 % of total Hgb Final     Creatinine, Urine 02/11/2022 189  20 - 275 mg/dL Final    Microalb, Ur 02/11/2022 0.9  See Note: mg/dL Final    Microalb/Creat Ratio 02/11/2022 5  <30 mcg/mg creat Final    WBC 02/10/2022 7.9  3.8 - 10.8 Thousand/uL Final    RBC 02/10/2022 4.39  3.80 - 5.10 Million/uL Final    Hemoglobin 02/10/2022 14.6  11.7 - 15.5 g/dL Final    Hematocrit 02/10/2022 43.6  35.0 - 45.0 % Final    MCV 02/10/2022 99.3  80.0 - 100.0 fL Final    MCH 02/10/2022 33.3 (A) 27.0 - 33.0 pg Final    MCHC 02/10/2022 33.5  32.0 - 36.0 g/dL Final    RDW 02/10/2022 12.9  11.0 - 15.0 % Final    Platelets 02/10/2022 311  140 - 400 Thousand/uL Final    MPV 02/10/2022 11.8  7.5 - 12.5 fL Final    Neutrophils, Abs 02/10/2022 5,325  1,500 - 7,800 cells/uL Final    Lymph # 02/10/2022 1,288  850 - 3,900 cells/uL Final    Mono # 02/10/2022 1,003 (A) 200 - 950 cells/uL Final    Eos # 02/10/2022 237  15 - 500 cells/uL Final    Baso # 02/10/2022 47  0 - 200 cells/uL Final    Neutrophils Relative 02/10/2022 67.4  % Final    Lymph % 02/10/2022 16.3  % Final    Mono % 02/10/2022 12.7  % Final    Eosinophil % 02/10/2022 3.0  % Final    Basophil % 02/10/2022 0.6  % Final    Glucose 02/10/2022 112 (A) 65 - 99 mg/dL Final    BUN 02/10/2022 20  7 - 25 mg/dL Final    Creatinine 02/10/2022 1.32 (A) 0.60 - 0.93 mg/dL Final    eGFR if non  02/10/2022 39 (A) > OR = 60 mL/min/1.73m2 Final    eGFR if  02/10/2022 45 (A) > OR = 60 mL/min/1.73m2 Final    BUN/Creatinine Ratio 02/10/2022 15  6 - 22 (calc) Final    Sodium 02/10/2022 137  135 - 146 mmol/L Final    Potassium 02/10/2022 4.4  3.5 - 5.3 mmol/L Final    Chloride 02/10/2022 97 (A) 98 - 110 mmol/L Final    CO2 02/10/2022 30  20 - 32 mmol/L Final    Calcium 02/10/2022 10.2  8.6 - 10.4 mg/dL Final    Total Protein 02/10/2022 7.6  6.1 - 8.1 g/dL Final    Albumin 02/10/2022 4.2  3.6 - 5.1 g/dL Final    Globulin, Total 02/10/2022 3.4  1.9 - 3.7 g/dL  (calc) Final    Albumin/Globulin Ratio 02/10/2022 1.2  1.0 - 2.5 (calc) Final    Total Bilirubin 02/10/2022 0.4  0.2 - 1.2 mg/dL Final    Alkaline Phosphatase 02/10/2022 71  37 - 153 U/L Final    AST 02/10/2022 30  10 - 35 U/L Final    ALT 02/10/2022 22  6 - 29 U/L Final       Past Medical History:   Diagnosis Date    Back pain     Cystitis, interstitial     Hypertension     Uncontrolled type 2 diabetes mellitus without complication, without long-term current use of insulin 1/22/2018    Wears glasses     Wears partial dentures     upper     Past Surgical History:   Procedure Laterality Date    CHOLECYSTECTOMY  02/21/2018    CYSTOSCOPY      D & C      DILATION AND CURETTAGE OF UTERUS      disk repair      L5 S1    EYE SURGERY      bilat cataract     Family History   Problem Relation Age of Onset    Cancer Father     Cancer Sister     Cancer Brother     Cancer Brother        Marital Status:   Alcohol History:  reports current alcohol use.  Tobacco History:  reports that she quit smoking about 28 years ago. Her smoking use included cigarettes. She has a 32.00 pack-year smoking history. She has never used smokeless tobacco.  Drug History:  reports no history of drug use.    Review of patient's allergies indicates:  No Known Allergies    Current Outpatient Medications:     alendronate (FOSAMAX) 70 MG tablet, TAKE 1 TABLET EVERY 7 DAYS, Disp: 12 tablet, Rfl: 4    bisacodyL (DULCOLAX) 5 mg EC tablet, , Disp: , Rfl:     cetirizine (ZYRTEC) 10 MG tablet, Take 1 tablet (10 mg total) by mouth once daily., Disp: 90 tablet, Rfl: 2    docusate sodium (COLACE) 100 MG capsule, , Disp: , Rfl:     estradioL (ESTRING) 2 mg (7.5 mcg /24 hour) vaginal ring, Place 2 mg vaginally every 3 (three) months. for 90 doses, Disp: 1 each, Rfl: 3    glucosamine-chondroit-vit C-Mn 500-400 mg capsule, , Disp: , Rfl:     hydrocortisone 2.5 % cream, aaa once every other day alternating with triamcinolone, Disp: 30 g,  Rfl: 1    hyoscyamine (OSCIMIN SL) 0.125 mg Subl, DISSOLVE 1 TABLET UNDER THE TONGUE THREE TIMES A DAY AS NEEDED, Disp: 270 tablet, Rfl: 1    potassium chloride (KLOR-CON) 10 MEQ TbSR, Take 10 mEq by mouth once daily., Disp: , Rfl:     sucralfate (CARAFATE) 1 gram tablet, Take 1 tablet (1 g total) by mouth 4 (four) times daily., Disp: 360 tablet, Rfl: 1    timolol maleate 0.5% (TIMOPTIC) 0.5 % Drop, Place 1 drop into both eyes 2 (two) times daily., Disp: , Rfl:     amitriptyline (ELAVIL) 25 MG tablet, Take 1 tablet (25 mg total) by mouth 3 (three) times daily., Disp: 270 tablet, Rfl: 1    ezetimibe (ZETIA) 10 mg tablet, Take 1 tablet (10 mg total) by mouth every evening., Disp: 90 tablet, Rfl: 1    hydroCHLOROthiazide (HYDRODIURIL) 25 MG tablet, Take 1 tablet (25 mg total) by mouth once daily., Disp: 90 tablet, Rfl: 1    nystatin-triamcinolone (MYCOLOG II) cream, Apply topically 4 (four) times daily. for 10 days (Patient not taking: Reported on 5/24/2022), Disp: 60 g, Rfl: 1    pantoprazole (PROTONIX) 40 MG tablet, Take 1 tablet (40 mg total) by mouth once daily., Disp: 90 tablet, Rfl: 1    valsartan (DIOVAN) 160 MG tablet, Take 1 tablet (160 mg total) by mouth once daily., Disp: 90 tablet, Rfl: 1    Review of Systems   Constitutional: Negative for activity change, appetite change, chills, diaphoresis, fatigue, fever and unexpected weight change.   HENT: Negative for congestion, ear pain, hearing loss, nosebleeds, postnasal drip, rhinorrhea, sinus pressure, sinus pain, sneezing, sore throat, tinnitus, trouble swallowing and voice change.    Eyes: Negative for photophobia, pain, discharge, itching and visual disturbance.   Respiratory: Negative for apnea, cough, chest tightness, shortness of breath, wheezing and stridor.    Cardiovascular: Negative for chest pain, palpitations and leg swelling.   Gastrointestinal: Positive for abdominal pain (HPI). Negative for abdominal distention, blood in stool,  "constipation, diarrhea, nausea and vomiting.        HPI   Endocrine: Negative for cold intolerance, heat intolerance, polydipsia and polyuria.   Genitourinary: Positive for flank pain. Negative for difficulty urinating, dyspareunia, dysuria, frequency, hematuria, menstrual problem, pelvic pain, urgency, vaginal discharge and vaginal pain.        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   Skin: Negative for pallor.   Allergic/Immunologic: Negative for environmental allergies and food allergies.   Neurological: Negative for dizziness, tremors, speech difficulty, weakness, light-headedness, numbness and headaches.   Hematological: Does not bruise/bleed easily.   Psychiatric/Behavioral: Negative for agitation, confusion, decreased concentration, dysphoric mood, sleep disturbance and suicidal ideas. The patient is not nervous/anxious.           Objective:      Vitals:    05/24/22 1548   BP: 122/74   Pulse: 70   Resp: 16   Temp: 98.2 °F (36.8 °C)   SpO2: 96%   Weight: 120.2 kg (265 lb)   Height: 5' 3" (1.6 m)     Physical Exam  Constitutional:       General: She is not in acute distress.     Appearance: She is obese. She is not ill-appearing.   HENT:      Head: Normocephalic and atraumatic.   Eyes:      General:         Right eye: Right eye discharge: amylase.      Extraocular Movements: Extraocular movements intact.      Pupils: Pupils are equal, round, and reactive to light.   Neck:      Vascular: No carotid bruit.   Cardiovascular:      Rate and Rhythm: Normal rate and regular rhythm.      Pulses: Normal pulses.      Heart sounds: Normal heart sounds.   Pulmonary:      Effort: Pulmonary effort is normal.      Breath sounds: Normal breath sounds.   Abdominal:      Tenderness: There is abdominal tenderness (only to deep palpation in RUQ and rid mid abdomen).   Musculoskeletal:      Right lower leg: No edema.      Left lower leg: " No edema.   Lymphadenopathy:      Cervical: No cervical adenopathy.   Skin:     General: Skin is warm and dry.      Capillary Refill: Capillary refill takes less than 2 seconds.   Neurological:      General: No focal deficit present.      Mental Status: She is alert and oriented to person, place, and time.   Psychiatric:         Mood and Affect: Mood normal.         Behavior: Behavior normal.         Thought Content: Thought content normal.         Judgment: Judgment normal.       Protective Sensation (w/ 10 gram monofilament):  Right: Intact  Left: Intact    Visual Inspection:  Normal -  Bilateral    Pedal Pulses:   Right: Present  Left: Present    Posterior tibialis:   Right:Present  Left: Present      Assessment:       1. RUQ pain    2. Epigastric pain    3. Right sided abdominal pain    4. Type 2 diabetes mellitus without complication, without long-term current use of insulin    5. GERD without esophagitis    6. Primary hypertension    7. Interstitial cystitis    8. Pure hypercholesterolemia    9. Hypertension, unspecified type         Plan:       RUQ pain        -     Amb ref GI    Epigastric pain  -     Lipase; Future; Expected date: 05/24/2022  -     Amylase; Future; Expected date: 05/24/2022    Right sided abdominal pain    Type 2 diabetes mellitus without complication, without long-term current use of insulin  -     Urinalysis Microscopic; Future; Expected date: 05/25/2022  -     Microalbumin/Creatinine Ratio, Urine; Future; Expected date: 05/25/2022    GERD without esophagitis  -     pantoprazole (PROTONIX) 40 MG tablet; Take 1 tablet (40 mg total) by mouth once daily.  Dispense: 90 tablet; Refill: 1  -     Ambulatory referral/consult to Gastroenterology; Future; Expected date: 05/31/2022    Primary hypertension  -     Urinalysis Microscopic; Future; Expected date: 05/25/2022    Interstitial cystitis  -     amitriptyline (ELAVIL) 25 MG tablet; Take 1 tablet (25 mg total) by mouth 3 (three) times daily.   Dispense: 270 tablet; Refill: 1    Pure hypercholesterolemia  -     ezetimibe (ZETIA) 10 mg tablet; Take 1 tablet (10 mg total) by mouth every evening.  Dispense: 90 tablet; Refill: 1    Hypertension, unspecified type  -     hydroCHLOROthiazide (HYDRODIURIL) 25 MG tablet; Take 1 tablet (25 mg total) by mouth once daily.  Dispense: 90 tablet; Refill: 1  -     valsartan (DIOVAN) 160 MG tablet; Take 1 tablet (160 mg total) by mouth once daily.  Dispense: 90 tablet; Refill: 1      No follow-ups on file.        5/24/2022 Elda Paredes M.D.

## 2022-06-02 NOTE — DISCHARGE INSTRUCTIONS
INSTRUCTIONS  To confirm your doctor has scheduled your surgery for:    COVID TESTING SCHEDULED:     Morning of surgery please check in with registration near Parking Garage Entrance then proceed to Registration then to endoscopy department    ENDOSCOPY NURSES will call the afternoon prior to procedure with your final arrival time.    TAKE ONLY THESE MEDICATIONS WITH A SMALL SIP OF WATER THE MORNING OF SURGERY:    DO NOT TAKE ANY INSULIN OR ORAL DIABETIC MEDICATIONS THE MORNING OF SURGERY UNLESS DIRECTED BY YOUR PHYSICIAN OR PRE ADMIT NURSE.    DO NOT TAKE THESE MEDICATIONS 5-7 DAYS PRIOR to your procedure per your surgeon's request: ASPIRIN, ALEVE, BC powder, SAMUEL SELTZER, IBUPROFEN, FISH OIL, VITAMIN E, OR HERBALS   (May take Tylenol)    If you are prescribed any types of blood thinners (Aspirin, Coumadin, Plavix, Pradaxa, Xarelto, Aggrenox, Effient, Eliquis, Savasya, Brilinta or any other), please ask your surgeon how many days before scheduled procedure should you stop taking them. You may also need to verify with prescribing physician if it is OK to stop your blood thinners.      INSTRUCTIONS IMPORTANT!!  Do not eat or drink anything between midnight and the time of your procedure- this includes gum, mints, and candy. You may brush your teeth but do not swallow.  ONLY if you are diabetic, check your sugar in the morning before your procedure.  Do not smoke, vape or drink alcoholic beverages 24 hours prior to your procedure.  If you wear contact lenses, dentures, hearing aids or glasses, bring a container to put them in during surgery and give to a family member for safe keeping.    Please leave all jewelry, piercing's and valuables at home.   Wear comfortable loose clothing and rubber soled shoes.  If your condition changes such as fever, cough, etc, please notify your surgeon.   ONLY if you have been diagnosed with sleep apnea please bring your C-PAP machine.  ONLY if you wear home oxygen please bring your  portable oxygen tank the day of your procedure.   ONLY for patients requiring bowel prep, written instructions will be given by your doctor's office.  ONLY if you have a neuro stimulator, please bring the controller with you the morning of surgery  Make arrangements in advance for transportation home by a responsible adult.  You must make arrangements for transportation, TAXI'S, UBER'S OR LYFTS ARE NOT ALLOWED.        If you have any questions about these instructions, call Endoscopy Nurse at [phone]

## 2022-06-07 NOTE — ANESTHESIA POSTPROCEDURE EVALUATION
Anesthesia Post Evaluation    Patient: Fransisca Frazier    Procedure(s) Performed: Procedure(s) (LRB):  ULTRASOUND, UPPER GI TRACT, ENDOSCOPIC (N/A)    Final Anesthesia Type: MAC      Patient location during evaluation: GI PACU  Patient participation: Yes- Able to Participate  Level of consciousness: awake and alert  Post-procedure vital signs: reviewed and stable  Pain management: adequate  Airway patency: patent    PONV status at discharge: No PONV  Anesthetic complications: no      Cardiovascular status: stable  Respiratory status: unassisted  Hydration status: euvolemic  Follow-up not needed.          Vitals Value Taken Time   /85 06/07/22 0805   Temp 36.8 °C (98.2 °F) 06/07/22 0747   Pulse 72 06/07/22 0805   Resp 18 06/07/22 0805   SpO2 97 % 06/07/22 0805         Event Time   Out of Recovery 08:20:38         Pain/Marc Score: No data recorded

## 2022-06-07 NOTE — ANESTHESIA PREPROCEDURE EVALUATION
06/07/2022  Fransisca Frazier is a 76 y.o., female.      Pre-op Assessment    I have reviewed the Patient Summary Reports.     I have reviewed the Nursing Notes. I have reviewed the NPO Status.   I have reviewed the Medications.     Review of Systems  Anesthesia Hx:  Denies Family Hx of Anesthesia complications.   Denies Personal Hx of Anesthesia complications.   Social:  Non-Smoker, No Alcohol Use    Hematology/Oncology:  Hematology Normal   Oncology Normal     EENT/Dental:  EENT/Dental Normal Full upper dentures are out.  Chipped right lower central incisor.   Cardiovascular:   Hypertension hyperlipidemia    Pulmonary:  Pulmonary Normal    Renal/:  Renal/ Normal  Interstitial cystitis   Hepatic/GI:   GERD (Fair control)    Musculoskeletal:  Spine Disorders: lumbar Disc disease    Neurological:  Neurology Normal    Endocrine:   Denies Diabetes.  Morbid Obesity / BMI > 40  Psych:  Psychiatric Normal           Physical Exam  General: Cooperative, Alert and Oriented    Airway:  Mallampati: III / II  Mouth Opening: Normal  TM Distance: > 6 cm  Tongue: Normal  Neck ROM: Normal ROM    Chest/Lungs:  Clear to auscultation, Normal Respiratory Rate    Heart:  Rate: Normal  Rhythm: Regular Rhythm  Sounds: Normal        Anesthesia Plan  Type of Anesthesia, risks & benefits discussed:    Anesthesia Type: MAC  Intra-op Monitoring Plan: Standard ASA Monitors  Informed Consent: Informed consent signed with the Patient and all parties understand the risks and agree with anesthesia plan.  All questions answered.   ASA Score: 3  Anesthesia Plan Notes: POM mask    Ready For Surgery From Anesthesia Perspective.     .

## 2022-06-07 NOTE — H&P
GASTROENTEROLOGY PRE-PROCEDURE H&P NOTE  Patient Name: Fransisca Frazier  Patient MRN: [id number]  Patient : 1945    Service date: 2022    PCP: Elda Paredes MD    No chief complaint on file.      HPI: Patient is a 76 y.o. female with PMHx as below here for evaluation of epig / RUQ pain s/p jerrica     Past Medical History:  Past Medical History:   Diagnosis Date    Back pain     Cystitis, interstitial     Hypertension     Interstitial cystitis 1998    Pneumonia     viral and bacterial     Rosacea     Uncontrolled type 2 diabetes mellitus without complication, without long-term current use of insulin 2018    Wears glasses     Wears partial dentures     upper        Past Surgical History:  Past Surgical History:   Procedure Laterality Date    CHOLECYSTECTOMY  2018    CYSTOSCOPY      D & C      DILATION AND CURETTAGE OF UTERUS      disk repair      L5 S1    EYE SURGERY      bilat cataract        Home Medications:  Medications Prior to Admission   Medication Sig Dispense Refill Last Dose    valsartan (DIOVAN) 160 MG tablet Take 1 tablet (160 mg total) by mouth once daily. 90 tablet 1 2022 at Unknown time    alendronate (FOSAMAX) 70 MG tablet TAKE 1 TABLET EVERY 7 DAYS 12 tablet 4     amitriptyline (ELAVIL) 25 MG tablet Take 1 tablet (25 mg total) by mouth 3 (three) times daily. 270 tablet 1     bisacodyL (DULCOLAX) 5 mg EC tablet        cetirizine (ZYRTEC) 10 MG tablet Take 1 tablet (10 mg total) by mouth once daily. 90 tablet 2     docusate sodium (COLACE) 100 MG capsule        estradioL (ESTRING) 2 mg (7.5 mcg /24 hour) vaginal ring Place 2 mg vaginally every 3 (three) months. for 90 doses 1 each 3     ezetimibe (ZETIA) 10 mg tablet Take 1 tablet (10 mg total) by mouth every evening. 90 tablet 1     glucosamine-chondroit-vit C-Mn 500-400 mg capsule        hydroCHLOROthiazide (HYDRODIURIL) 25 MG tablet Take 1 tablet (25 mg total) by mouth once daily. 90 tablet 1      hydrocortisone 2.5 % cream aaa once every other day alternating with triamcinolone 30 g 1     hyoscyamine (OSCIMIN SL) 0.125 mg Subl DISSOLVE 1 TABLET UNDER THE TONGUE THREE TIMES A DAY AS NEEDED 270 tablet 1     nystatin-triamcinolone (MYCOLOG II) cream Apply topically 4 (four) times daily. for 10 days (Patient not taking: Reported on 2022) 60 g 1     pantoprazole (PROTONIX) 40 MG tablet Take 1 tablet (40 mg total) by mouth once daily. 90 tablet 1     potassium chloride (KLOR-CON) 10 MEQ TbSR Take 10 mEq by mouth once daily.       sucralfate (CARAFATE) 1 gram tablet Take 1 tablet (1 g total) by mouth 4 (four) times daily. 360 tablet 1     timolol maleate 0.5% (TIMOPTIC) 0.5 % Drop Place 1 drop into both eyes 2 (two) times daily.          Inpatient Medications:        Review of patient's allergies indicates:  No Known Allergies    Social History:   Social History     Occupational History    Not on file   Tobacco Use    Smoking status: Former Smoker     Packs/day: 1.00     Years: 32.00     Pack years: 32.00     Types: Cigarettes     Quit date: 1993     Years since quittin.8    Smokeless tobacco: Never Used   Substance and Sexual Activity    Alcohol use: Yes     Comment: occasional  2018    Drug use: No    Sexual activity: Not on file       Family History:   Family History   Problem Relation Age of Onset    Cancer Father     Cancer Sister     Cancer Brother     Cancer Brother        Review of Systems:  A 10 point review of systems was performed and was normal, except as mentioned in the HPI, including constitutional, HEENT, heme, lymph, cardiovascular, respiratory, gastrointestinal, genitourinary, neurologic, endocrine, psychiatric and musculoskeletal.      OBJECTIVE:    Physical Exam:  24 Hour Vital Sign Ranges: Temp:  [98.4 °F (36.9 °C)] 98.4 °F (36.9 °C)  Pulse:  [78] 78  Resp:  [18] 18  SpO2:  [95 %] 95 %  BP: (168)/(72) 168/72  Most recent vitals: BP (!) 168/72 (BP Location:  "Left arm, Patient Position: Lying)   Pulse 78   Temp 98.4 °F (36.9 °C) (Oral)   Resp 18   Ht 5' 3" (1.6 m)   Wt 122 kg (269 lb)   SpO2 95% Comment: RA  Breastfeeding No   BMI 47.65 kg/m²    GEN: well-developed, well-nourished, awake and alert, non-toxic appearing adult  HEENT: PERRL, sclera anicteric, oral mucosa pink and moist without lesion  NECK: trachea midline; Good ROM  CV: regular rate and rhythm, no murmurs or gallops  RESP: clear to auscultation bilaterally, no wheezes, rhonci or rales  ABD: soft, non-tender, non-distended, normal bowel sounds  EXT: no swelling or edema, 2+ pulses distally  SKIN: no rashes or jaundice  PSYCH: normal affect    Labs:   No results for input(s): WBC, MCV, PLT in the last 72 hours.    Invalid input(s): HGBAU  No results for input(s): NA, K, CL, CO2, BUN, GLU in the last 72 hours.    Invalid input(s): CREA  No results for input(s): ALB in the last 72 hours.    Invalid input(s): ALKP, SGOT, SGPT, TBIL, DBIL, TPRO  No results for input(s): PT, INR, PTT in the last 72 hours.      IMPRESSION / RECOMMENDATIONS:  EGD /EUS w/ interventions as warranted.   RIsks, benefits, alternatives discussed in detail regarding upcoming procedures and sedation. Some of the more common endoscopic complications include but not limited to immediate or delayed perforation, bleeding, infections, pain, inadvertent injury to surrounding tissue / organs and possible need for surgical evaluation. Patient expressed understanding, all questions answered and will proceed with procedure as planned.     Alen CASSIDY bigtincan III  6/7/2022  7:12 AM      "

## 2022-06-07 NOTE — TRANSFER OF CARE
"Anesthesia Transfer of Care Note    Patient: Fransisca Frazier    Procedure(s) Performed: Procedure(s) (LRB):  ULTRASOUND, UPPER GI TRACT, ENDOSCOPIC (N/A)    Patient location: GI    Anesthesia Type: MAC    Transport from OR: Transported from OR on room air with adequate spontaneous ventilation    Post pain: adequate analgesia    Post assessment: no apparent anesthetic complications    Post vital signs: stable    Level of consciousness: awake and alert    Nausea/Vomiting: no nausea/vomiting    Complications: none    Transfer of care protocol was followed      Last vitals:   Visit Vitals  BP (!) 168/72 (BP Location: Left arm, Patient Position: Lying)   Pulse 78   Temp 36.9 °C (98.4 °F) (Oral)   Resp 18   Ht 5' 3" (1.6 m)   Wt 122 kg (269 lb)   SpO2 95%   Breastfeeding No   BMI 47.65 kg/m²     "

## 2022-06-07 NOTE — DISCHARGE INSTRUCTIONS
No driving for 24 hours  No alcohol for 24 hours  Do not make any critical decisions or sign legal documents until tomorrow.  Doctor Mireille will call you with results of pathology in about 1 week.

## 2022-06-07 NOTE — [employer] PATIENT INSTRUCTIONS
Discharge Summary/Instructions after an Endoscopic Procedure  Patient Name: Fransisca Frazier  Patient MRN: [id number]  Patient YOB: 1945  Tuesday, June 7, 2022  Alen Kendrick III, MD  RESTRICTIONS:  During your procedure today, you received medications for sedation.  These   medications may affect your judgment, balance and coordination.  Therefore,   for 24 hours, you have the following restrictions:   - DO NOT drive a car, operate machinery, make legal/financial decisions,   sign important papers or drink alcohol.    ACTIVITY:  Today: no heavy lifting, straining or running due to procedural   sedation/anesthesia.  The following day: return to full activity including work.  DIET:  Eat and drink normally unless instructed otherwise.     TREATMENT FOR COMMON SIDE EFFECTS:  - Mild abdominal pain, nausea, belching, bloating or excessive gas:  rest,   eat lightly and use a heating pad.  - Sore Throat: treat with throat lozenges and/or gargle with warm salt   water.  - Because air was used during the procedure, expelling large amounts of air   from your rectum or belching is normal.  - If a bowel prep was taken, you may not have a bowel movement for 1-3 days.    This is normal.  SYMPTOMS TO WATCH FOR AND REPORT TO YOUR PHYSICIAN:  1. Abdominal pain or bloating, other than gas cramps.  2. Chest pain.  3. Back pain.  4. Signs of infection such as: chills or fever occurring within 24 hours   after the procedure.  5. Rectal bleeding, which would show as bright red, maroon, or black stools.   (A tablespoon of blood from the rectum is not serious, especially if   hemorrhoids are present.)  6. Vomiting.  7. Weakness or dizziness.  GO DIRECTLY TO THE NEAREST EMERGENCY ROOM IF YOU HAVE ANY OF THE FOLLOWING:      Difficulty breathing              Chills and/or fever over 101 F   Persistent vomiting and/or vomiting blood   Severe abdominal pain   Severe chest pain   Black, tarry stools   Bleeding- more than one  tablespoon   Any other symptom or condition that you feel may need urgent attention  Your doctor recommends these additional instructions:  If any biopsies were taken, your doctors clinic will contact you in 1 to 2   weeks with any results.  - Discharge patient to home.   - Patient has a contact number available for emergencies.  The signs and   symptoms of potential delayed complications were discussed with the   patient.  Return to normal activities tomorrow.  Written discharge   instructions were provided to the patient.   - Resume regular diet.   - Continue present medications.  For questions, problems or results please call your physician - Alen Kendrick III, MD at Work:  [phone].  [facility], EMERGENCY ROOM PHONE NUMBER: [phone]  IF A COMPLICATION OR EMERGENCY SITUATION ARISES AND YOU ARE UNABLE TO REACH   YOUR PHYSICIAN - GO DIRECTLY TO THE EMERGENCY ROOM.  Alen Kendrick III, MD  6/7/2022 7:44:43 AM  This report has been verified and signed electronically.  Dear patient,  As a result of recent federal legislation (The Federal Cures Act), you may   receive lab or pathology results from your procedure in your [facility]   account before your physician is able to contact you. Your physician or   their representative will relay the results to you with their   recommendations at their soonest availability.  Thank you,  [employer]

## 2022-07-10 NOTE — PROGRESS NOTES
SUBJECTIVE:    Patient ID: Fransisca Frazier is a 76 y.o. female.    Chief Complaint: Abdominal Pain, lab results, and Follow-up    HPI     Back for recheck-Na 136 K 3.8 Glu 104 BUN 18 Cr 1.2 GFR 43 H/H 13.9/ 42.7 Platelets 285,000    Patient drove to [place] end of last week and returned yesterday-legs started swelling on the trip to [place] and it gradually got worse-usually there is some swelling-she states she thought she saw some erythema at the bottom of the left leg-she has no calf pain-she was taken off HCZ due to calcium-she just started lasix on the 20th of June-    Hospital Outpatient Visit on 06/02/2022   Component Date Value Ref Range Status    Sodium 06/02/2022 136  136 - 145 mmol/L Final    Potassium 06/02/2022 3.8  3.5 - 5.1 mmol/L Final    Chloride 06/02/2022 99  95 - 110 mmol/L Final    CO2 06/02/2022 26  23 - 29 mmol/L Final    Glucose 06/02/2022 104  70 - 110 mg/dL Final    BUN 06/02/2022 18  8 - 23 mg/dL Final    Creatinine 06/02/2022 1.2  0.5 - 1.4 mg/dL Final    Calcium 06/02/2022 9.7  8.7 - 10.5 mg/dL Final    Total Protein 06/02/2022 7.9  6.0 - 8.4 g/dL Final    Albumin 06/02/2022 4.1  3.5 - 5.2 g/dL Final    Total Bilirubin 06/02/2022 0.8  0.1 - 1.0 mg/dL Final    Alkaline Phosphatase 06/02/2022 50 (A) 55 - 135 U/L Final    AST 06/02/2022 28  10 - 40 U/L Final    ALT 06/02/2022 19  10 - 44 U/L Final    Anion Gap 06/02/2022 11  8 - 16 mmol/L Final    eGFR if African American 06/02/2022 50.0 (A) >60 mL/min/1.73 m^2 Final    eGFR if non African American 06/02/2022 43.4 (A) >60 mL/min/1.73 m^2 Final    WBC 06/02/2022 5.45  3.90 - 12.70 K/uL Final    RBC 06/02/2022 4.41  4.00 - 5.40 M/uL Final    Hemoglobin 06/02/2022 13.9  12.0 - 16.0 g/dL Final    Hematocrit 06/02/2022 42.7  37.0 - 48.5 % Final    MCV 06/02/2022 97  82 - 98 fL Final    MCH 06/02/2022 31.5 (A) 27.0 - 31.0 pg Final    MCHC 06/02/2022 32.6  32.0 - 36.0 g/dL Final    RDW 06/02/2022 13.5  11.5 - 14.5 %  Final    Platelets 06/02/2022 285  150 - 450 K/uL Final    MPV 06/02/2022 11.6  9.2 - 12.9 fL Final    Immature Granulocytes 06/02/2022 0.4  0.0 - 0.5 % Final    Gran # (ANC) 06/02/2022 2.9  1.8 - 7.7 K/uL Final    Immature Grans (Abs) 06/02/2022 0.02  0.00 - 0.04 K/uL Final    Lymph # 06/02/2022 1.1  1.0 - 4.8 K/uL Final    Mono # 06/02/2022 0.9  0.3 - 1.0 K/uL Final    Eos # 06/02/2022 0.4  0.0 - 0.5 K/uL Final    Baso # 06/02/2022 0.03  0.00 - 0.20 K/uL Final    nRBC 06/02/2022 0  0 /100 WBC Final    Gran % 06/02/2022 53.3  38.0 - 73.0 % Final    Lymph % 06/02/2022 20.9  18.0 - 48.0 % Final    Mono % 06/02/2022 16.9 (A) 4.0 - 15.0 % Final    Eosinophil % 06/02/2022 7.9  0.0 - 8.0 % Final    Basophil % 06/02/2022 0.6  0.0 - 1.9 % Final    Differential Method 06/02/2022 Automated   Final   Lab Visit on 05/24/2022   Component Date Value Ref Range Status    Lipase 05/24/2022 34  4 - 60 U/L Final    Amylase 05/24/2022 56  20 - 110 U/L Final    Specimen UA 05/24/2022 Urine, Clean Catch   Final    Color, UA 05/24/2022 Yellow  Yellow, Straw, Sarah Final    Appearance, UA 05/24/2022 Clear  Clear Final    pH, UA 05/24/2022 7.0  5.0 - 8.0 Final    Specific Gravity, UA 05/24/2022 1.015  1.005 - 1.030 Final    Protein, UA 05/24/2022 Negative  Negative Final    Glucose, UA 05/24/2022 Negative  Negative Final    Ketones, UA 05/24/2022 Negative  Negative Final    Bilirubin (UA) 05/24/2022 Negative  Negative Final    Occult Blood UA 05/24/2022 Negative  Negative Final    Nitrite, UA 05/24/2022 Negative  Negative Final    Urobilinogen, UA 05/24/2022 Negative  Negative EU/dL Final    Leukocytes, UA 05/24/2022 Negative  Negative Final    Microalbumin, Urine 05/24/2022 2.3  <19.9 ug/mL Final    Creatinine, Urine 05/24/2022 84.0  15.0 - 325.0 mg/dL Final    Microalb/Creat Ratio 05/24/2022 2.7  0.0 - 30.0 ug/mg Final   Office Visit on 05/12/2022   Component Date Value Ref Range Status    Color, UA  05/12/2022 Yellow   Final    pH, UA 05/12/2022 5   Final    WBC, UA 05/12/2022 neg   Final    Nitrite, UA 05/12/2022 neg   Final    Protein, POC 05/12/2022 neg   Final    Glucose, UA 05/12/2022 neg   Final    Ketones, UA 05/12/2022 neg   Final    Urobilinogen, UA 05/12/2022 0.2   Final    Bilirubin, POC 05/12/2022 neg   Final    Blood, UA 05/12/2022 neg   Final    Clarity, UA 05/12/2022 Clear   Final    Spec Grav UA 05/12/2022 1.015   Final   Lab Visit on 04/22/2022   Component Date Value Ref Range Status    Sodium 04/22/2022 137  136 - 145 mmol/L Final    Potassium 04/22/2022 3.9  3.5 - 5.1 mmol/L Final    Chloride 04/22/2022 103  95 - 110 mmol/L Final    CO2 04/22/2022 24  23 - 29 mmol/L Final    Glucose 04/22/2022 114 (A) 70 - 110 mg/dL Final    BUN 04/22/2022 22  8 - 23 mg/dL Final    Creatinine 04/22/2022 1.0  0.5 - 1.4 mg/dL Final    Calcium 04/22/2022 9.5  8.7 - 10.5 mg/dL Final    Anion Gap 04/22/2022 10  8 - 16 mmol/L Final    eGFR if African American 04/22/2022 >60.0  >60 mL/min/1.73 m^2 Final    eGFR if non African American 04/22/2022 54.1 (A) >60 mL/min/1.73 m^2 Final   Office Visit on 02/10/2022   Component Date Value Ref Range Status    Color, UA 02/10/2022 Yellow   Final    pH, UA 02/10/2022 5   Final    WBC, UA 02/10/2022 negative   Final    Nitrite, UA 02/10/2022 negative   Final    Protein, POC 02/10/2022 trace   Final    Glucose, UA 02/10/2022 negative   Final    Ketones, UA 02/10/2022 negative   Final    Urobilinogen, UA 02/10/2022 normal   Final    Bilirubin, POC 02/10/2022 2+   Final    Blood, UA 02/10/2022 negative   Final    Clarity, UA 02/10/2022 Slightly Cloudy   Final    Spec Grav UA 02/10/2022 1.020   Final   Office Visit on 11/11/2021   Component Date Value Ref Range Status    Color, UA 11/11/2021 Yellow   Final    pH, UA 11/11/2021 6   Final    WBC, UA 11/11/2021 negative   Final    Nitrite, UA 11/11/2021 negative   Final    Protein, POC 11/11/2021  negative   Final    Glucose, UA 11/11/2021 negative   Final    Ketones, UA 11/11/2021 negative   Final    Urobilinogen, UA 11/11/2021 negative   Final    Bilirubin, POC 11/11/2021 negative   Final    Blood, UA 11/11/2021 negative   Final    Clarity, UA 11/11/2021 Clear   Final    Spec Grav UA 11/11/2021 1.010   Final   Office Visit on 08/10/2021   Component Date Value Ref Range Status    Color, UA 08/10/2021 Yellow   Final    pH, UA 08/10/2021 7   Final    WBC, UA 08/10/2021 trace   Final    Nitrite, UA 08/10/2021 negative   Final    Protein, POC 08/10/2021 negative   Final    Glucose, UA 08/10/2021 normal   Final    Ketones, UA 08/10/2021 negative   Final    Urobilinogen, UA 08/10/2021 normal   Final    Bilirubin, POC 08/10/2021 negative   Final    Blood, UA 08/10/2021 negative   Final    Clarity, UA 08/10/2021 Slightly Cloudy   Final    Spec Grav UA 08/10/2021 1.005   Final    Urine Culture, Routine 08/10/2021 No growth   Final   Office Visit on 08/05/2021   Component Date Value Ref Range Status    Hemoglobin A1C 02/10/2022 6.0 (A) <5.7 % of total Hgb Final    Creatinine, Urine 02/11/2022 189  20 - 275 mg/dL Final    Microalb, Ur 02/11/2022 0.9  See Note: mg/dL Final    Microalb/Creat Ratio 02/11/2022 5  <30 mcg/mg creat Final    WBC 02/10/2022 7.9  3.8 - 10.8 Thousand/uL Final    RBC 02/10/2022 4.39  3.80 - 5.10 Million/uL Final    Hemoglobin 02/10/2022 14.6  11.7 - 15.5 g/dL Final    Hematocrit 02/10/2022 43.6  35.0 - 45.0 % Final    MCV 02/10/2022 99.3  80.0 - 100.0 fL Final    MCH 02/10/2022 33.3 (A) 27.0 - 33.0 pg Final    MCHC 02/10/2022 33.5  32.0 - 36.0 g/dL Final    RDW 02/10/2022 12.9  11.0 - 15.0 % Final    Platelets 02/10/2022 311  140 - 400 Thousand/uL Final    MPV 02/10/2022 11.8  7.5 - 12.5 fL Final    Neutrophils, Abs 02/10/2022 5,325  1,500 - 7,800 cells/uL Final    Lymph # 02/10/2022 1,288  850 - 3,900 cells/uL Final    Mono # 02/10/2022 1,003 (A) 200 - 950  cells/uL Final    Eos # 02/10/2022 237  15 - 500 cells/uL Final    Baso # 02/10/2022 47  0 - 200 cells/uL Final    Neutrophils Relative 02/10/2022 67.4  % Final    Lymph % 02/10/2022 16.3  % Final    Mono % 02/10/2022 12.7  % Final    Eosinophil % 02/10/2022 3.0  % Final    Basophil % 02/10/2022 0.6  % Final    Glucose 02/10/2022 112 (A) 65 - 99 mg/dL Final    BUN 02/10/2022 20  7 - 25 mg/dL Final    Creatinine 02/10/2022 1.32 (A) 0.60 - 0.93 mg/dL Final    eGFR if non  02/10/2022 39 (A) > OR = 60 mL/min/1.73m2 Final    eGFR if  02/10/2022 45 (A) > OR = 60 mL/min/1.73m2 Final    BUN/Creatinine Ratio 02/10/2022 15  6 - 22 (calc) Final    Sodium 02/10/2022 137  135 - 146 mmol/L Final    Potassium 02/10/2022 4.4  3.5 - 5.3 mmol/L Final    Chloride 02/10/2022 97 (A) 98 - 110 mmol/L Final    CO2 02/10/2022 30  20 - 32 mmol/L Final    Calcium 02/10/2022 10.2  8.6 - 10.4 mg/dL Final    Total Protein 02/10/2022 7.6  6.1 - 8.1 g/dL Final    Albumin 02/10/2022 4.2  3.6 - 5.1 g/dL Final    Globulin, Total 02/10/2022 3.4  1.9 - 3.7 g/dL (calc) Final    Albumin/Globulin Ratio 02/10/2022 1.2  1.0 - 2.5 (calc) Final    Total Bilirubin 02/10/2022 0.4  0.2 - 1.2 mg/dL Final    Alkaline Phosphatase 02/10/2022 71  37 - 153 U/L Final    AST 02/10/2022 30  10 - 35 U/L Final    ALT 02/10/2022 22  6 - 29 U/L Final       Past Medical History:   Diagnosis Date    Back pain     Cystitis, interstitial     Hypertension     Interstitial cystitis 1998    Pneumonia     viral and bacterial     Rosacea     Uncontrolled type 2 diabetes mellitus without complication, without long-term current use of insulin 1/22/2018    Wears glasses     Wears partial dentures     upper     Past Surgical History:   Procedure Laterality Date    CHOLECYSTECTOMY  02/21/2018    CYSTOSCOPY      D & C      DILATION AND CURETTAGE OF UTERUS      disk repair      L5 S1    ENDOSCOPIC ULTRASOUND OF UPPER  GASTROINTESTINAL TRACT N/A 6/7/2022    Procedure: ULTRASOUND, UPPER GI TRACT, ENDOSCOPIC;  Surgeon: Alen Kendrick III, MD;  Location: [facility];  Service: Endoscopy;  Laterality: N/A;    EYE SURGERY      bilat cataract     Family History   Problem Relation Age of Onset    Cancer Father     Cancer Sister     Cancer Brother     Cancer Brother        Marital Status:   Alcohol History:  reports current alcohol use.  Tobacco History:  reports that she quit smoking about 28 years ago. Her smoking use included cigarettes. She has a 32.00 pack-year smoking history. She has never used smokeless tobacco.  Drug History:  reports no history of drug use.    Review of patient's allergies indicates:  No Known Allergies    Current Outpatient Medications:     alendronate (FOSAMAX) 70 MG tablet, TAKE 1 TABLET EVERY 7 DAYS, Disp: 12 tablet, Rfl: 4    amitriptyline (ELAVIL) 25 MG tablet, Take 1 tablet (25 mg total) by mouth 3 (three) times daily., Disp: 270 tablet, Rfl: 1    bisacodyL (DULCOLAX) 5 mg EC tablet, , Disp: , Rfl:     cetirizine (ZYRTEC) 10 MG tablet, Take 1 tablet (10 mg total) by mouth once daily., Disp: 90 tablet, Rfl: 2    docusate sodium (COLACE) 100 MG capsule, , Disp: , Rfl:     estradioL (ESTRING) 2 mg (7.5 mcg /24 hour) vaginal ring, Place 2 mg vaginally every 3 (three) months. for 90 doses, Disp: 1 each, Rfl: 3    ezetimibe (ZETIA) 10 mg tablet, Take 1 tablet (10 mg total) by mouth every evening., Disp: 90 tablet, Rfl: 1    furosemide (LASIX) 20 MG tablet, Take 20 mg by mouth once daily., Disp: , Rfl:     glucosamine-chondroit-vit C-Mn 500-400 mg capsule, , Disp: , Rfl:     hydrocortisone 2.5 % cream, aaa once every other day alternating with triamcinolone, Disp: 30 g, Rfl: 1    hyoscyamine (OSCIMIN SL) 0.125 mg Subl, DISSOLVE 1 TABLET UNDER THE TONGUE THREE TIMES A DAY AS NEEDED, Disp: 270 tablet, Rfl: 1    pantoprazole (PROTONIX) 40 MG tablet, Take 1 tablet (40 mg total) by mouth  once daily., Disp: 90 tablet, Rfl: 1    potassium chloride (KLOR-CON) 10 MEQ TbSR, Take 10 mEq by mouth once daily., Disp: , Rfl:     sucralfate (CARAFATE) 1 gram tablet, Take 1 tablet (1 g total) by mouth 4 (four) times daily., Disp: 360 tablet, Rfl: 1    timolol maleate 0.5% (TIMOPTIC) 0.5 % Drop, Place 1 drop into both eyes 2 (two) times daily., Disp: , Rfl:     valsartan (DIOVAN) 160 MG tablet, Take 1 tablet (160 mg total) by mouth once daily., Disp: 90 tablet, Rfl: 1    nystatin-triamcinolone (MYCOLOG II) cream, Apply topically 4 (four) times daily. for 10 days (Patient not taking: No sig reported), Disp: 60 g, Rfl: 1    Review of Systems   All other systems reviewed and are negative.         Objective:      Last 3 sets of Vitals    Vitals - 1 value per visit 6/7/2022 7/11/2022 7/11/2022   SYSTOLIC 115 - 122   DIASTOLIC 85 - 64   Pulse 72 - 62   Temp - - 98.1   Resp 18 - 16   SPO2 97 - 97   Weight (lb) - - 267   Weight (kg) - - 121.11   Height - - 63   BMI (Calculated) - - 47.3   VISIT REPORT - - -   Pain Score  - 0 -   Some recent data might be hidden       Physical Exam  Constitutional:       General: She is not in acute distress.     Appearance: She is obese. She is not ill-appearing.   HENT:      Head: Normocephalic and atraumatic.   Eyes:      Extraocular Movements: Extraocular movements intact.      Pupils: Pupils are equal, round, and reactive to light.   Neck:      Vascular: No carotid bruit.   Cardiovascular:      Rate and Rhythm: Normal rate and regular rhythm.      Pulses: Normal pulses.      Heart sounds: Normal heart sounds. No murmur heard.  Pulmonary:      Effort: Pulmonary effort is normal.      Breath sounds: Normal breath sounds.   Abdominal:      General: Bowel sounds are normal.      Palpations: Abdomen is soft.   Musculoskeletal:      Right lower leg: Edema (2+) present.      Left lower leg: Edema (2+) present.   Lymphadenopathy:      Cervical: No cervical adenopathy.   Skin:      General: Skin is warm and dry.   Neurological:      General: No focal deficit present.      Mental Status: She is alert and oriented to person, place, and time.      Gait: Gait normal.   Psychiatric:         Mood and Affect: Mood normal.         Thought Content: Thought content normal.           Assessment:       No diagnosis found.     Plan:       There are no diagnoses linked to this encounter.  No follow-ups on file.        7/11/2022 Elda Paredes M.D.

## 2022-07-11 PROBLEM — S06.4X9A EPIDURAL HEMORRHAGE WITH LOSS OF CONSCIOUSNESS OF UNSPECIFIED DURATION, INITIAL ENCOUNTER: Status: ACTIVE | Noted: 2022-01-01

## 2022-07-11 PROBLEM — N18.31 STAGE 3A CHRONIC KIDNEY DISEASE: Status: ACTIVE | Noted: 2022-01-01

## 2022-07-14 NOTE — PROGRESS NOTES
Subjective:        The chief complaint leading to consultation is: follow-up edema  The patient location is:  home  Visit type: Virtual visit with synchronous audio/video or audio only  Home    HPI     Patient is in for re-evaluation of her swelling-the feet are better but not totally without edema-she lost ? Pounds of fluid-she has the lasix at home now-she will be instructe to take 40 mg for three days then go down to 20 mg then three times a week    All else is ok-upcoming labs coming due in August     Hospital Outpatient Visit on 06/02/2022   Component Date Value Ref Range Status    Sodium 06/02/2022 136  136 - 145 mmol/L Final    Potassium 06/02/2022 3.8  3.5 - 5.1 mmol/L Final    Chloride 06/02/2022 99  95 - 110 mmol/L Final    CO2 06/02/2022 26  23 - 29 mmol/L Final    Glucose 06/02/2022 104  70 - 110 mg/dL Final    BUN 06/02/2022 18  8 - 23 mg/dL Final    Creatinine 06/02/2022 1.2  0.5 - 1.4 mg/dL Final    Calcium 06/02/2022 9.7  8.7 - 10.5 mg/dL Final    Total Protein 06/02/2022 7.9  6.0 - 8.4 g/dL Final    Albumin 06/02/2022 4.1  3.5 - 5.2 g/dL Final    Total Bilirubin 06/02/2022 0.8  0.1 - 1.0 mg/dL Final    Alkaline Phosphatase 06/02/2022 50 (A) 55 - 135 U/L Final    AST 06/02/2022 28  10 - 40 U/L Final    ALT 06/02/2022 19  10 - 44 U/L Final    Anion Gap 06/02/2022 11  8 - 16 mmol/L Final    eGFR if African American 06/02/2022 50.0 (A) >60 mL/min/1.73 m^2 Final    eGFR if non African American 06/02/2022 43.4 (A) >60 mL/min/1.73 m^2 Final    WBC 06/02/2022 5.45  3.90 - 12.70 K/uL Final    RBC 06/02/2022 4.41  4.00 - 5.40 M/uL Final    Hemoglobin 06/02/2022 13.9  12.0 - 16.0 g/dL Final    Hematocrit 06/02/2022 42.7  37.0 - 48.5 % Final    MCV 06/02/2022 97  82 - 98 fL Final    MCH 06/02/2022 31.5 (A) 27.0 - 31.0 pg Final    MCHC 06/02/2022 32.6  32.0 - 36.0 g/dL Final    RDW 06/02/2022 13.5  11.5 - 14.5 % Final    Platelets 06/02/2022 285  150 - 450 K/uL Final    MPV  06/02/2022 11.6  9.2 - 12.9 fL Final    Immature Granulocytes 06/02/2022 0.4  0.0 - 0.5 % Final    Gran # (ANC) 06/02/2022 2.9  1.8 - 7.7 K/uL Final    Immature Grans (Abs) 06/02/2022 0.02  0.00 - 0.04 K/uL Final    Lymph # 06/02/2022 1.1  1.0 - 4.8 K/uL Final    Mono # 06/02/2022 0.9  0.3 - 1.0 K/uL Final    Eos # 06/02/2022 0.4  0.0 - 0.5 K/uL Final    Baso # 06/02/2022 0.03  0.00 - 0.20 K/uL Final    nRBC 06/02/2022 0  0 /100 WBC Final    Gran % 06/02/2022 53.3  38.0 - 73.0 % Final    Lymph % 06/02/2022 20.9  18.0 - 48.0 % Final    Mono % 06/02/2022 16.9 (A) 4.0 - 15.0 % Final    Eosinophil % 06/02/2022 7.9  0.0 - 8.0 % Final    Basophil % 06/02/2022 0.6  0.0 - 1.9 % Final    Differential Method 06/02/2022 Automated   Final   Lab Visit on 05/24/2022   Component Date Value Ref Range Status    Lipase 05/24/2022 34  4 - 60 U/L Final    Amylase 05/24/2022 56  20 - 110 U/L Final    Specimen UA 05/24/2022 Urine, Clean Catch   Final    Color, UA 05/24/2022 Yellow  Yellow, Straw, Sarah Final    Appearance, UA 05/24/2022 Clear  Clear Final    pH, UA 05/24/2022 7.0  5.0 - 8.0 Final    Specific Gravity, UA 05/24/2022 1.015  1.005 - 1.030 Final    Protein, UA 05/24/2022 Negative  Negative Final    Glucose, UA 05/24/2022 Negative  Negative Final    Ketones, UA 05/24/2022 Negative  Negative Final    Bilirubin (UA) 05/24/2022 Negative  Negative Final    Occult Blood UA 05/24/2022 Negative  Negative Final    Nitrite, UA 05/24/2022 Negative  Negative Final    Urobilinogen, UA 05/24/2022 Negative  Negative EU/dL Final    Leukocytes, UA 05/24/2022 Negative  Negative Final    Microalbumin, Urine 05/24/2022 2.3  <19.9 ug/mL Final    Creatinine, Urine 05/24/2022 84.0  15.0 - 325.0 mg/dL Final    Microalb/Creat Ratio 05/24/2022 2.7  0.0 - 30.0 ug/mg Final   Office Visit on 05/12/2022   Component Date Value Ref Range Status    Color, UA 05/12/2022 Yellow   Final    pH, UA 05/12/2022 5   Final    WBC,  UA 05/12/2022 neg   Final    Nitrite, UA 05/12/2022 neg   Final    Protein, POC 05/12/2022 neg   Final    Glucose, UA 05/12/2022 neg   Final    Ketones, UA 05/12/2022 neg   Final    Urobilinogen, UA 05/12/2022 0.2   Final    Bilirubin, POC 05/12/2022 neg   Final    Blood, UA 05/12/2022 neg   Final    Clarity, UA 05/12/2022 Clear   Final    Spec Grav UA 05/12/2022 1.015   Final   Lab Visit on 04/22/2022   Component Date Value Ref Range Status    Sodium 04/22/2022 137  136 - 145 mmol/L Final    Potassium 04/22/2022 3.9  3.5 - 5.1 mmol/L Final    Chloride 04/22/2022 103  95 - 110 mmol/L Final    CO2 04/22/2022 24  23 - 29 mmol/L Final    Glucose 04/22/2022 114 (A) 70 - 110 mg/dL Final    BUN 04/22/2022 22  8 - 23 mg/dL Final    Creatinine 04/22/2022 1.0  0.5 - 1.4 mg/dL Final    Calcium 04/22/2022 9.5  8.7 - 10.5 mg/dL Final    Anion Gap 04/22/2022 10  8 - 16 mmol/L Final    eGFR if African American 04/22/2022 >60.0  >60 mL/min/1.73 m^2 Final    eGFR if non African American 04/22/2022 54.1 (A) >60 mL/min/1.73 m^2 Final   Office Visit on 02/10/2022   Component Date Value Ref Range Status    Color, UA 02/10/2022 Yellow   Final    pH, UA 02/10/2022 5   Final    WBC, UA 02/10/2022 negative   Final    Nitrite, UA 02/10/2022 negative   Final    Protein, POC 02/10/2022 trace   Final    Glucose, UA 02/10/2022 negative   Final    Ketones, UA 02/10/2022 negative   Final    Urobilinogen, UA 02/10/2022 normal   Final    Bilirubin, POC 02/10/2022 2+   Final    Blood, UA 02/10/2022 negative   Final    Clarity, UA 02/10/2022 Slightly Cloudy   Final    Spec Grav UA 02/10/2022 1.020   Final   Office Visit on 11/11/2021   Component Date Value Ref Range Status    Color, UA 11/11/2021 Yellow   Final    pH, UA 11/11/2021 6   Final    WBC, UA 11/11/2021 negative   Final    Nitrite, UA 11/11/2021 negative   Final    Protein, POC 11/11/2021 negative   Final    Glucose, UA 11/11/2021 negative   Final     Ketones, UA 11/11/2021 negative   Final    Urobilinogen, UA 11/11/2021 negative   Final    Bilirubin, POC 11/11/2021 negative   Final    Blood, UA 11/11/2021 negative   Final    Clarity, UA 11/11/2021 Clear   Final    Spec Grav UA 11/11/2021 1.010   Final   Office Visit on 08/10/2021   Component Date Value Ref Range Status    Color, UA 08/10/2021 Yellow   Final    pH, UA 08/10/2021 7   Final    WBC, UA 08/10/2021 trace   Final    Nitrite, UA 08/10/2021 negative   Final    Protein, POC 08/10/2021 negative   Final    Glucose, UA 08/10/2021 normal   Final    Ketones, UA 08/10/2021 negative   Final    Urobilinogen, UA 08/10/2021 normal   Final    Bilirubin, POC 08/10/2021 negative   Final    Blood, UA 08/10/2021 negative   Final    Clarity, UA 08/10/2021 Slightly Cloudy   Final    Spec Grav UA 08/10/2021 1.005   Final    Urine Culture, Routine 08/10/2021 No growth   Final   Office Visit on 08/05/2021   Component Date Value Ref Range Status    Hemoglobin A1C 02/10/2022 6.0 (A) <5.7 % of total Hgb Final    Creatinine, Urine 02/11/2022 189  20 - 275 mg/dL Final    Microalb, Ur 02/11/2022 0.9  See Note: mg/dL Final    Microalb/Creat Ratio 02/11/2022 5  <30 mcg/mg creat Final    WBC 02/10/2022 7.9  3.8 - 10.8 Thousand/uL Final    RBC 02/10/2022 4.39  3.80 - 5.10 Million/uL Final    Hemoglobin 02/10/2022 14.6  11.7 - 15.5 g/dL Final    Hematocrit 02/10/2022 43.6  35.0 - 45.0 % Final    MCV 02/10/2022 99.3  80.0 - 100.0 fL Final    MCH 02/10/2022 33.3 (A) 27.0 - 33.0 pg Final    MCHC 02/10/2022 33.5  32.0 - 36.0 g/dL Final    RDW 02/10/2022 12.9  11.0 - 15.0 % Final    Platelets 02/10/2022 311  140 - 400 Thousand/uL Final    MPV 02/10/2022 11.8  7.5 - 12.5 fL Final    Neutrophils, Abs 02/10/2022 5,325  1,500 - 7,800 cells/uL Final    Lymph # 02/10/2022 1,288  850 - 3,900 cells/uL Final    Mono # 02/10/2022 1,003 (A) 200 - 950 cells/uL Final    Eos # 02/10/2022 237  15 - 500 cells/uL Final     Baso # 02/10/2022 47  0 - 200 cells/uL Final    Neutrophils Relative 02/10/2022 67.4  % Final    Lymph % 02/10/2022 16.3  % Final    Mono % 02/10/2022 12.7  % Final    Eosinophil % 02/10/2022 3.0  % Final    Basophil % 02/10/2022 0.6  % Final    Glucose 02/10/2022 112 (A) 65 - 99 mg/dL Final    BUN 02/10/2022 20  7 - 25 mg/dL Final    Creatinine 02/10/2022 1.32 (A) 0.60 - 0.93 mg/dL Final    eGFR if non  02/10/2022 39 (A) > OR = 60 mL/min/1.73m2 Final    eGFR if  02/10/2022 45 (A) > OR = 60 mL/min/1.73m2 Final    BUN/Creatinine Ratio 02/10/2022 15  6 - 22 (calc) Final    Sodium 02/10/2022 137  135 - 146 mmol/L Final    Potassium 02/10/2022 4.4  3.5 - 5.3 mmol/L Final    Chloride 02/10/2022 97 (A) 98 - 110 mmol/L Final    CO2 02/10/2022 30  20 - 32 mmol/L Final    Calcium 02/10/2022 10.2  8.6 - 10.4 mg/dL Final    Total Protein 02/10/2022 7.6  6.1 - 8.1 g/dL Final    Albumin 02/10/2022 4.2  3.6 - 5.1 g/dL Final    Globulin, Total 02/10/2022 3.4  1.9 - 3.7 g/dL (calc) Final    Albumin/Globulin Ratio 02/10/2022 1.2  1.0 - 2.5 (calc) Final    Total Bilirubin 02/10/2022 0.4  0.2 - 1.2 mg/dL Final    Alkaline Phosphatase 02/10/2022 71  37 - 153 U/L Final    AST 02/10/2022 30  10 - 35 U/L Final    ALT 02/10/2022 22  6 - 29 U/L Final       Past Surgical History:   Procedure Laterality Date    CHOLECYSTECTOMY  02/21/2018    CYSTOSCOPY      D & C      DILATION AND CURETTAGE OF UTERUS      disk repair      L5 S1    ENDOSCOPIC ULTRASOUND OF UPPER GASTROINTESTINAL TRACT N/A 6/7/2022    Procedure: ULTRASOUND, UPPER GI TRACT, ENDOSCOPIC;  Surgeon: Alen Kendrick III, MD;  Location: [facility];  Service: Endoscopy;  Laterality: N/A;    EYE SURGERY      bilat cataract     Past Medical History:   Diagnosis Date    Back pain     Cystitis, interstitial     Hypertension     Interstitial cystitis 1998    Pneumonia     viral and bacterial     Rosacea      Uncontrolled type 2 diabetes mellitus without complication, without long-term current use of insulin 1/22/2018    Wears glasses     Wears partial dentures     upper     Family History   Problem Relation Age of Onset    Cancer Father     Cancer Sister     Cancer Brother     Cancer Brother         Social History:   Marital Status:   Alcohol History:  reports current alcohol use.  Tobacco History:  reports that she quit smoking about 28 years ago. Her smoking use included cigarettes. She has a 32.00 pack-year smoking history. She has never used smokeless tobacco.  Drug History:  reports no history of drug use.    Review of patient's allergies indicates:  No Known Allergies    Current Outpatient Medications   Medication Sig Dispense Refill    alendronate (FOSAMAX) 70 MG tablet TAKE 1 TABLET EVERY 7 DAYS 12 tablet 4    amitriptyline (ELAVIL) 25 MG tablet Take 1 tablet (25 mg total) by mouth 3 (three) times daily. 270 tablet 1    amoxicillin-clavulanate 875-125mg (AUGMENTIN) 875-125 mg per tablet Take 1 tablet by mouth 2 (two) times daily. 20 tablet 0    bisacodyL (DULCOLAX) 5 mg EC tablet       bumetanide (BUMEX) 1 MG tablet Take 1 tablet (1 mg total) by mouth once daily. for 3 days 3 tablet 0    cetirizine (ZYRTEC) 10 MG tablet Take 1 tablet (10 mg total) by mouth once daily. 90 tablet 2    docusate sodium (COLACE) 100 MG capsule       estradioL (ESTRING) 2 mg (7.5 mcg /24 hour) vaginal ring Place 2 mg vaginally every 3 (three) months. for 90 doses 1 each 3    ezetimibe (ZETIA) 10 mg tablet Take 1 tablet (10 mg total) by mouth every evening. 90 tablet 1    glucosamine-chondroit-vit C-Mn 500-400 mg capsule       hydrocortisone 2.5 % cream aaa once every other day alternating with triamcinolone 30 g 1    hyoscyamine (OSCIMIN SL) 0.125 mg Subl DISSOLVE 1 TABLET UNDER THE TONGUE THREE TIMES A DAY AS NEEDED 270 tablet 1    nystatin-triamcinolone (MYCOLOG II) cream Apply topically 4 (four) times  daily. for 10 days (Patient not taking: No sig reported) 60 g 1    pantoprazole (PROTONIX) 40 MG tablet Take 1 tablet (40 mg total) by mouth once daily. 90 tablet 1    potassium chloride (KLOR-CON) 10 MEQ TbSR Take 10 mEq by mouth once daily.      sucralfate (CARAFATE) 1 gram tablet Take 1 tablet (1 g total) by mouth 4 (four) times daily. 360 tablet 1    timolol maleate 0.5% (TIMOPTIC) 0.5 % Drop Place 1 drop into both eyes 2 (two) times daily.      valsartan (DIOVAN) 160 MG tablet Take 1 tablet (160 mg total) by mouth once daily. 90 tablet 1     No current facility-administered medications for this visit.       Review of Systems   Constitutional: Negative for activity change, appetite change, chills, fatigue, fever and unexpected weight change.   HENT: Negative for congestion, ear pain, hearing loss, postnasal drip, rhinorrhea, sinus pain, sneezing, sore throat, tinnitus and trouble swallowing.    Eyes: Negative for pain, discharge and visual disturbance.   Respiratory: Negative for cough, choking, chest tightness, shortness of breath and wheezing.    Cardiovascular: Negative for chest pain, palpitations and leg swelling.   Gastrointestinal: Negative for abdominal distention, abdominal pain, blood in stool, constipation, diarrhea, nausea and vomiting.   Endocrine: Negative for cold intolerance, heat intolerance, polydipsia and polyuria.   Genitourinary: Negative for difficulty urinating, dysuria, frequency, hematuria, menstrual problem, pelvic pain and urgency.   Musculoskeletal: Positive for joint swelling. Negative for arthralgias, back pain and neck pain.   Skin: Negative for pallor and rash.   Allergic/Immunologic: Negative for environmental allergies and food allergies.   Neurological: Negative for dizziness, tremors, weakness, numbness and headaches.   Hematological: Does not bruise/bleed easily.   Psychiatric/Behavioral: Negative for agitation, confusion, dysphoric mood, sleep disturbance and suicidal  ideas. The patient is not nervous/anxious.          Objective:        Physical Exam:   Physical Exam         Assessment:       1. Fluid retention in legs      Plan:   Fluid retention in legs    Other orders  -     amoxicillin-clavulanate 875-125mg (AUGMENTIN) 875-125 mg per tablet; Take 1 tablet by mouth 2 (two) times daily.  Dispense: 20 tablet; Refill: 0      No follow-ups on file.    Total time spent with patient: 13 minutes    Each patient to whom he or she provides medical services by telemedicine is:  (1) informed of the relationship between the physician and patient and the respective role of any other health care provider with respect to management of the patient; and (2) notified that he or she may decline to receive medical services by telemedicine and may withdraw from such care at any time.    This note was created using [employer] voice recognition software that occasionally misinterprets phrases or words.

## 2022-08-18 NOTE — PROGRESS NOTES
Subjective:       Patient ID: Fransisca Frazier is a 76 y.o. female.    Chief Complaint: pessary maintenance    HPI  Fransisca Frazier is a 76 y.o. female who presents today for routine estring change.  She has been using the estring for awhile and feels that it works well for her.  She denies any vaginal complaints/concerns.  She did have a UTI back in July.  She went to see her PCP and was given augmentin.  She feels that the UTI has resolved, but she is still having some urethral irritation.  She feels that this comes and goes.  Her UA today is negative.  She denies any other real complaints/concerns at this time and is ready to proceed with the estring.    Review of Systems   Constitutional: Negative for activity change, fever and unexpected weight change.   HENT: Negative for hearing loss.    Eyes: Negative for visual disturbance.   Respiratory: Negative for shortness of breath and wheezing.    Cardiovascular: Negative for chest pain, palpitations and leg swelling.   Gastrointestinal: Negative for abdominal pain, constipation and diarrhea.   Genitourinary: Positive for frequency. Negative for dyspareunia, dysuria, urgency, vaginal bleeding and vaginal discharge.   Musculoskeletal: Negative for gait problem and neck pain.   Skin: Negative for rash and wound.   Allergic/Immunologic: Negative for immunocompromised state.   Neurological: Negative for tremors, speech difficulty and weakness.   Hematological: Does not bruise/bleed easily.   Psychiatric/Behavioral: Negative for agitation and confusion.       Objective:      Physical Exam  Constitutional:       General: She is not in acute distress.     Appearance: She is well-developed.   HENT:      Head: Normocephalic and atraumatic.   Neck:      Thyroid: No thyromegaly.   Pulmonary:      Effort: Pulmonary effort is normal. No respiratory distress.   Abdominal:      Palpations: Abdomen is soft.      Tenderness: There is no abdominal tenderness.      Hernia: No hernia is  present.   Musculoskeletal:         General: Normal range of motion.      Cervical back: Neck supple.   Skin:     General: Skin is warm and dry.      Findings: No rash.   Neurological:      Mental Status: She is alert and oriented to person, place, and time.   Psychiatric:         Behavior: Behavior normal.       Pelvic Exam:  V: No lesions. No palpable nodes.   Va: no discharge or bleeding.  Good length.  Dominant midline cystocele Ba=0  Meatus:No caruncle or stenosis  Urethra: Non tender. No suburethral masses.  Cx/Cuff: Normal   Uterus: small, non-tender  Ad: No mass or tenderness.  Levators :Symmetrical. Normal tone. Non tender.  BL: Non tender  RV: No hemorrhoids.      Assessment:       1. Urinary frequency    2. Voiding dysfunction    3. Cystocele, midline    4. Pessary maintenance    5. Atrophic vaginitis    6. Urethral irritation          Procedure note- estring  pessary removed and discarded.    After betadine irrigation of the vagina, a new estring pessary, which the pt provided,  was reinserted into the vagina.  Pt ambulated in the room and denies any discomfort.  NP reminded pt that the first 24-48 hours are the most likely time for the pessary to fall out and to monitor the toilet before flushing.  Pt verbalized understanding.      Plan:       Urinary frequency- monitor  -     POCT URINE DIPSTICK WITHOUT MICROSCOPE    Voiding dysfunction- monitor    Cystocele, midline- continue with estring    Pessary maintenance- as noted above    Atrophic vaginitis- as noted above    Urethral irritation- monitor    RTC 3 months

## 2022-11-06 NOTE — PROGRESS NOTES
SUBJECTIVE:    Patient ID: Fransisca Frazier is a 77 y.o. female.    Chief Complaint: Medication Refill, Hypertension, and Follow-up    HPI    Follow-up labs-she has an elevated A1C and does not want to take any med at this point-she will get on a diabetic diet, avoid carbs, sweets  and recheck A1C in June of 23-that will give her lots of time to lose weight-    Patient wants antibiotic-feels some burning in the urethra-needs urine-    Last 3 sets of Vitals    Vitals - 1 value per visit 8/18/2022 11/8/2022 11/8/2022   SYSTOLIC 154 - 134   DIASTOLIC 70 - 80   Pulse 76 - 62   Temp - - 98.1   Resp - - 16   SPO2 - - 97   Weight (lb) 266.76 - 259   Weight (kg) 121 - 117.482   Height 63 - 63   BMI (Calculated) 47.3 - 45.9   VISIT REPORT - - -   Pain Score  - 0 -   Some recent data might be hidden        Office Visit on 08/18/2022   Component Date Value Ref Range Status    Color, UA 08/18/2022 Yellow   Final    pH, UA 08/18/2022 5   Final    WBC, UA 08/18/2022 neg   Final    Nitrite, UA 08/18/2022 neg   Final    Protein, POC 08/18/2022 neg   Final    Glucose, UA 08/18/2022 neg   Final    Ketones, UA 08/18/2022 neg   Final    Urobilinogen, UA 08/18/2022 neg   Final    Bilirubin, POC 08/18/2022 neg   Final    Blood, UA 08/18/2022 neg   Final    Clarity, UA 08/18/2022 Clear   Final    Spec Grav UA 08/18/2022 1.010   Final   Hospital Outpatient Visit on 06/02/2022   Component Date Value Ref Range Status    Sodium 06/02/2022 136  136 - 145 mmol/L Final    Potassium 06/02/2022 3.8  3.5 - 5.1 mmol/L Final    Chloride 06/02/2022 99  95 - 110 mmol/L Final    CO2 06/02/2022 26  23 - 29 mmol/L Final    Glucose 06/02/2022 104  70 - 110 mg/dL Final    BUN 06/02/2022 18  8 - 23 mg/dL Final    Creatinine 06/02/2022 1.2  0.5 - 1.4 mg/dL Final    Calcium 06/02/2022 9.7  8.7 - 10.5 mg/dL Final    Total Protein 06/02/2022 7.9  6.0 - 8.4 g/dL Final    Albumin 06/02/2022 4.1  3.5 - 5.2 g/dL Final    Total Bilirubin  06/02/2022 0.8  0.1 - 1.0 mg/dL Final    Alkaline Phosphatase 06/02/2022 50 (L)  55 - 135 U/L Final    AST 06/02/2022 28  10 - 40 U/L Final    ALT 06/02/2022 19  10 - 44 U/L Final    Anion Gap 06/02/2022 11  8 - 16 mmol/L Final    eGFR if African American 06/02/2022 50.0 (A)  >60 mL/min/1.73 m^2 Final    eGFR if non African American 06/02/2022 43.4 (A)  >60 mL/min/1.73 m^2 Final    WBC 06/02/2022 5.45  3.90 - 12.70 K/uL Final    RBC 06/02/2022 4.41  4.00 - 5.40 M/uL Final    Hemoglobin 06/02/2022 13.9  12.0 - 16.0 g/dL Final    Hematocrit 06/02/2022 42.7  37.0 - 48.5 % Final    MCV 06/02/2022 97  82 - 98 fL Final    MCH 06/02/2022 31.5 (H)  27.0 - 31.0 pg Final    MCHC 06/02/2022 32.6  32.0 - 36.0 g/dL Final    RDW 06/02/2022 13.5  11.5 - 14.5 % Final    Platelets 06/02/2022 285  150 - 450 K/uL Final    MPV 06/02/2022 11.6  9.2 - 12.9 fL Final    Immature Granulocytes 06/02/2022 0.4  0.0 - 0.5 % Final    Gran # (ANC) 06/02/2022 2.9  1.8 - 7.7 K/uL Final    Immature Grans (Abs) 06/02/2022 0.02  0.00 - 0.04 K/uL Final    Lymph # 06/02/2022 1.1  1.0 - 4.8 K/uL Final    Mono # 06/02/2022 0.9  0.3 - 1.0 K/uL Final    Eos # 06/02/2022 0.4  0.0 - 0.5 K/uL Final    Baso # 06/02/2022 0.03  0.00 - 0.20 K/uL Final    nRBC 06/02/2022 0  0 /100 WBC Final    Gran % 06/02/2022 53.3  38.0 - 73.0 % Final    Lymph % 06/02/2022 20.9  18.0 - 48.0 % Final    Mono % 06/02/2022 16.9 (H)  4.0 - 15.0 % Final    Eosinophil % 06/02/2022 7.9  0.0 - 8.0 % Final    Basophil % 06/02/2022 0.6  0.0 - 1.9 % Final    Differential Method 06/02/2022 Automated   Final   Lab Visit on 05/24/2022   Component Date Value Ref Range Status    Lipase 05/24/2022 34  4 - 60 U/L Final    Amylase 05/24/2022 56  20 - 110 U/L Final    Specimen UA 05/24/2022 Urine, Clean Catch   Final    Color, UA 05/24/2022 Yellow  Yellow, Straw, Sarah Final    Appearance, UA 05/24/2022 Clear  Clear Final    pH, UA 05/24/2022 7.0  5.0 - 8.0 Final     Specific Gravity, UA 05/24/2022 1.015  1.005 - 1.030 Final    Protein, UA 05/24/2022 Negative  Negative Final    Glucose, UA 05/24/2022 Negative  Negative Final    Ketones, UA 05/24/2022 Negative  Negative Final    Bilirubin (UA) 05/24/2022 Negative  Negative Final    Occult Blood UA 05/24/2022 Negative  Negative Final    Nitrite, UA 05/24/2022 Negative  Negative Final    Urobilinogen, UA 05/24/2022 Negative  Negative EU/dL Final    Leukocytes, UA 05/24/2022 Negative  Negative Final    Microalbumin, Urine 05/24/2022 2.3  <19.9 ug/mL Final    Creatinine, Urine 05/24/2022 84.0  15.0 - 325.0 mg/dL Final    Microalb/Creat Ratio 05/24/2022 2.7  0.0 - 30.0 ug/mg Final   Office Visit on 05/12/2022   Component Date Value Ref Range Status    Color, UA 05/12/2022 Yellow   Final    pH, UA 05/12/2022 5   Final    WBC, UA 05/12/2022 neg   Final    Nitrite, UA 05/12/2022 neg   Final    Protein, POC 05/12/2022 neg   Final    Glucose, UA 05/12/2022 neg   Final    Ketones, UA 05/12/2022 neg   Final    Urobilinogen, UA 05/12/2022 0.2   Final    Bilirubin, POC 05/12/2022 neg   Final    Blood, UA 05/12/2022 neg   Final    Clarity, UA 05/12/2022 Clear   Final    Spec Grav UA 05/12/2022 1.015   Final   Lab Visit on 04/22/2022   Component Date Value Ref Range Status    Sodium 04/22/2022 137  136 - 145 mmol/L Final    Potassium 04/22/2022 3.9  3.5 - 5.1 mmol/L Final    Chloride 04/22/2022 103  95 - 110 mmol/L Final    CO2 04/22/2022 24  23 - 29 mmol/L Final    Glucose 04/22/2022 114 (H)  70 - 110 mg/dL Final    BUN 04/22/2022 22  8 - 23 mg/dL Final    Creatinine 04/22/2022 1.0  0.5 - 1.4 mg/dL Final    Calcium 04/22/2022 9.5  8.7 - 10.5 mg/dL Final    Anion Gap 04/22/2022 10  8 - 16 mmol/L Final    eGFR if African American 04/22/2022 >60.0  >60 mL/min/1.73 m^2 Final    eGFR if non African American 04/22/2022 54.1 (A)  >60 mL/min/1.73 m^2 Final   Office Visit on 02/21/2022   Component Date Value Ref  Range Status    Glucose 08/18/2022 101 (H)  65 - 99 mg/dL Final    BUN 08/18/2022 16  7 - 25 mg/dL Final    Creatinine 08/18/2022 1.22 (H)  0.60 - 1.00 mg/dL Final    eGFR 08/18/2022 45 (L)  > OR = 60 mL/min/1.73m2 Final    BUN/Creatinine Ratio 08/18/2022 13  6 - 22 (calc) Final    Sodium 08/18/2022 138  135 - 146 mmol/L Final    Potassium 08/18/2022 4.2  3.5 - 5.3 mmol/L Final    Chloride 08/18/2022 103  98 - 110 mmol/L Final    CO2 08/18/2022 24  20 - 32 mmol/L Final    Calcium 08/18/2022 9.6  8.6 - 10.4 mg/dL Final    Total Protein 08/18/2022 7.7  6.1 - 8.1 g/dL Final    Albumin 08/18/2022 4.3  3.6 - 5.1 g/dL Final    Globulin, Total 08/18/2022 3.4  1.9 - 3.7 g/dL (calc) Final    Albumin/Globulin Ratio 08/18/2022 1.3  1.0 - 2.5 (calc) Final    Total Bilirubin 08/18/2022 0.5  0.2 - 1.2 mg/dL Final    Alkaline Phosphatase 08/18/2022 66  37 - 153 U/L Final    AST 08/18/2022 22  10 - 35 U/L Final    ALT 08/18/2022 11  6 - 29 U/L Final    Hemoglobin A1C 08/18/2022 5.9 (H)  <5.7 % of total Hgb Final    WBC 08/18/2022 7.2  3.8 - 10.8 Thousand/uL Final    RBC 08/18/2022 4.66  3.80 - 5.10 Million/uL Final    Hemoglobin 08/18/2022 14.7  11.7 - 15.5 g/dL Final    Hematocrit 08/18/2022 44.6  35.0 - 45.0 % Final    MCV 08/18/2022 95.7  80.0 - 100.0 fL Final    MCH 08/18/2022 31.5  27.0 - 33.0 pg Final    MCHC 08/18/2022 33.0  32.0 - 36.0 g/dL Final    RDW 08/18/2022 12.9  11.0 - 15.0 % Final    Platelets 08/18/2022 278  140 - 400 Thousand/uL Final    MPV 08/18/2022 12.2  7.5 - 12.5 fL Final    Neutrophils, Abs 08/18/2022 4,262  1,500 - 7,800 cells/uL Final    Lymph # 08/18/2022 1,519  850 - 3,900 cells/uL Final    Mono # 08/18/2022 1,015 (H)  200 - 950 cells/uL Final    Eos # 08/18/2022 360  15 - 500 cells/uL Final    Baso # 08/18/2022 43  0 - 200 cells/uL Final    Neutrophils Relative 08/18/2022 59.2  % Final    Lymph % 08/18/2022 21.1  % Final    Mono % 08/18/2022 14.1  % Final     Eosinophil % 08/18/2022 5.0  % Final    Basophil % 08/18/2022 0.6  % Final    Cholesterol 08/18/2022 170  <200 mg/dL Final    HDL 08/18/2022 50  > OR = 50 mg/dL Final    Triglycerides 08/18/2022 165 (H)  <150 mg/dL Final    LDL Cholesterol 08/18/2022 94  mg/dL (calc) Final    HDL/Cholesterol Ratio 08/18/2022 3.4  <5.0 (calc) Final    Non HDL Chol. (LDL+VLDL) 08/18/2022 120  <130 mg/dL (calc) Final    Creatinine, Urine 08/18/2022 126  20 - 275 mg/dL Final    Microalb, Ur 08/18/2022 0.7  See Note: mg/dL Final    Microalb/Creat Ratio 08/18/2022 6  <30 mcg/mg creat Final    WBC Casts, UA 08/18/2022 NONE SEEN  < OR = 5 /HPF Final    RBC Casts, UA 08/18/2022 NONE SEEN  < OR = 2 /HPF Final    Squam Epithel, UA 08/18/2022 6-10 (A)  < OR = 5 /HPF Final    Bacteria, UA 08/18/2022 FEW (A)  NONE SEEN /HPF Final    Hyaline Casts, UA 08/18/2022 NONE SEEN  NONE SEEN /LPF Final    Yeast, UA 08/18/2022 FEW (A)  NONE SEEN /HPF Final    Service Cmt: 08/18/2022    Final   Office Visit on 02/10/2022   Component Date Value Ref Range Status    Color, UA 02/10/2022 Yellow   Final    pH, UA 02/10/2022 5   Final    WBC, UA 02/10/2022 negative   Final    Nitrite, UA 02/10/2022 negative   Final    Protein, POC 02/10/2022 trace   Final    Glucose, UA 02/10/2022 negative   Final    Ketones, UA 02/10/2022 negative   Final    Urobilinogen, UA 02/10/2022 normal   Final    Bilirubin, POC 02/10/2022 2+   Final    Blood, UA 02/10/2022 negative   Final    Clarity, UA 02/10/2022 Slightly Cloudy   Final    Spec Grav UA 02/10/2022 1.020   Final   Office Visit on 11/11/2021   Component Date Value Ref Range Status    Color, UA 11/11/2021 Yellow   Final    pH, UA 11/11/2021 6   Final    WBC, UA 11/11/2021 negative   Final    Nitrite, UA 11/11/2021 negative   Final    Protein, POC 11/11/2021 negative   Final    Glucose, UA 11/11/2021 negative   Final    Ketones, UA 11/11/2021 negative   Final    Urobilinogen, UA 11/11/2021  negative   Final    Bilirubin, POC 11/11/2021 negative   Final    Blood, UA 11/11/2021 negative   Final    Clarity, UA 11/11/2021 Clear   Final    Spec Grav UA 11/11/2021 1.010   Final       Past Medical History:   Diagnosis Date    Back pain     Cystitis, interstitial     Hypertension     Interstitial cystitis 1998    Pneumonia     viral and bacterial     Rosacea     Uncontrolled type 2 diabetes mellitus without complication, without long-term current use of insulin 1/22/2018    Wears glasses     Wears partial dentures     upper     Past Surgical History:   Procedure Laterality Date    CHOLECYSTECTOMY  02/21/2018    CYSTOSCOPY      D & C      DILATION AND CURETTAGE OF UTERUS      disk repair      L5 S1    ENDOSCOPIC ULTRASOUND OF UPPER GASTROINTESTINAL TRACT N/A 6/7/2022    Procedure: ULTRASOUND, UPPER GI TRACT, ENDOSCOPIC;  Surgeon: Alen Kendrick III, MD;  Location: [facility];  Service: Endoscopy;  Laterality: N/A;    EYE SURGERY      bilat cataract     Family History   Problem Relation Age of Onset    Cancer Father     Cancer Sister     Cancer Brother     Cancer Brother        Marital Status:   Alcohol History:  reports current alcohol use.  Tobacco History:  reports that she quit smoking about 29 years ago. Her smoking use included cigarettes. She has a 32.00 pack-year smoking history. She has never used smokeless tobacco.  Drug History:  reports no history of drug use.    Review of patient's allergies indicates:  No Known Allergies    Current Outpatient Medications:     brimonidine (MIRVASO) 0.33 % Gel, Apply 1 application topically every morning. To red areas on face, Disp: 90 g, Rfl: 3    estradioL (ESTRING) 2 mg (7.5 mcg /24 hour) vaginal ring, Place 2 mg vaginally every 3 (three) months. for 90 doses, Disp: 1 each, Rfl: 3    glucosamine-chondroit-vit C-Mn 500-400 mg capsule, , Disp: , Rfl:     hyoscyamine (OSCIMIN SL) 0.125 mg Subl, DISSOLVE 1 TABLET UNDER THE TONGUE  THREE TIMES A DAY AS NEEDED, Disp: 270 tablet, Rfl: 1    potassium chloride (KLOR-CON) 10 MEQ TbSR, Take 10 mEq by mouth once daily., Disp: , Rfl:     sucralfate (CARAFATE) 1 gram tablet, Take 1 tablet (1 g total) by mouth 4 (four) times daily., Disp: 360 tablet, Rfl: 1    timolol maleate 0.5% (TIMOPTIC) 0.5 % Drop, Place 1 drop into both eyes 2 (two) times daily., Disp: , Rfl:     valsartan (DIOVAN) 160 MG tablet, Take 1 tablet (160 mg total) by mouth once daily., Disp: 90 tablet, Rfl: 1    alendronate (FOSAMAX) 70 MG tablet, TAKE 1 TABLET EVERY 7 DAYS, Disp: 12 tablet, Rfl: 4    amitriptyline (ELAVIL) 25 MG tablet, Take 1 tablet (25 mg total) by mouth 3 (three) times daily., Disp: 270 tablet, Rfl: 1    cefUROXime (CEFTIN) 500 MG tablet, Take 1 tablet (500 mg total) by mouth 2 (two) times daily., Disp: 20 tablet, Rfl: 0    cetirizine (ZYRTEC) 10 MG tablet, Take 1 tablet (10 mg total) by mouth once daily., Disp: 90 tablet, Rfl: 2    docusate sodium (COLACE) 100 MG capsule, , Disp: , Rfl:     ezetimibe (ZETIA) 10 mg tablet, Take 1 tablet (10 mg total) by mouth every evening., Disp: 90 tablet, Rfl: 1    hydrocortisone 2.5 % cream, aaa once every other day alternating with triamcinolone (Patient not taking: Reported on 11/8/2022), Disp: 30 g, Rfl: 1    nystatin-triamcinolone (MYCOLOG II) cream, Apply topically 4 (four) times daily. for 10 days (Patient not taking: Reported on 5/24/2022), Disp: 60 g, Rfl: 1    pantoprazole (PROTONIX) 40 MG tablet, Take 1 tablet (40 mg total) by mouth once daily., Disp: 90 tablet, Rfl: 1    phenazopyridine (PYRIDIUM) 100 MG tablet, Take 1 tablet (100 mg total) by mouth 3 (three) times daily as needed for Pain., Disp: 9 tablet, Rfl: 0    Review of Systems   Constitutional:  Negative for appetite change, chills, diaphoresis, fatigue, fever and unexpected weight change.   HENT:  Negative for congestion, ear pain, hearing loss, nosebleeds, postnasal drip, sinus pressure, sinus  pain, sneezing, sore throat, tinnitus, trouble swallowing and voice change.    Eyes:  Negative for photophobia, pain, itching and visual disturbance.   Respiratory:  Negative for apnea, cough, chest tightness, shortness of breath, wheezing and stridor.    Cardiovascular:  Negative for chest pain, palpitations and leg swelling.   Gastrointestinal:  Negative for abdominal distention, abdominal pain, blood in stool, constipation, diarrhea, nausea and vomiting.   Endocrine: Negative for cold intolerance, heat intolerance, polydipsia and polyuria.   Genitourinary:  Positive for dysuria. Negative for difficulty urinating, dyspareunia, flank pain, frequency, hematuria, menstrual problem, pelvic pain, urgency, vaginal discharge and vaginal pain.        IC   Musculoskeletal:  Negative for arthralgias, back pain, joint swelling, myalgias, neck pain and neck stiffness.   Skin:  Negative for pallor.   Allergic/Immunologic: Negative for environmental allergies and food allergies.   Neurological:  Negative for dizziness, tremors, speech difficulty, weakness, light-headedness and numbness.   Hematological:  Does not bruise/bleed easily.   Psychiatric/Behavioral:  Negative for agitation, confusion, decreased concentration, sleep disturbance and suicidal ideas. The patient is not nervous/anxious.         Objective:      Vitals    Vitals - 1 value per visit 7/11/2022 8/18/2022 8/18/2022 11/8/2022 11/8/2022   SYSTOLIC 122 - 154 - 134   DIASTOLIC 64 - 70 - 80   Pulse 62 - 76 - 62   Temp 98.1 - - - 98.1   Resp 16 - - - 16   SPO2 97 - - - 97   Weight (lb) 267 - 266.76 - 259   Weight (kg) 121.11 - 121 - 117.482   Height 63 - 63 - 63   BMI (Calculated) 47.3 - 47.3 - 45.9   VISIT REPORT - - - - -   Pain Score  - 0 - 0 -   Some recent data might be hidden       Physical Exam  Vitals and nursing note reviewed.   Constitutional:       General: She is not in acute distress.     Appearance: She is obese. She is not ill-appearing.   HENT:       Head: Normocephalic and atraumatic.   Eyes:      Extraocular Movements: Extraocular movements intact.      Pupils: Pupils are equal, round, and reactive to light.   Neck:      Vascular: No carotid bruit.   Cardiovascular:      Rate and Rhythm: Normal rate and regular rhythm.   Abdominal:      General: Bowel sounds are normal.      Palpations: Abdomen is soft.      Comments: obese   Musculoskeletal:      Right lower leg: No edema.      Left lower leg: No edema.   Lymphadenopathy:      Cervical: No cervical adenopathy.   Skin:     General: Skin is warm and dry.   Neurological:      General: No focal deficit present.      Mental Status: She is oriented to person, place, and time.   Psychiatric:         Mood and Affect: Mood normal.         Behavior: Behavior normal.         Thought Content: Thought content normal.         Judgment: Judgment normal.         Assessment:       1. Urethritis    2. Prediabetes    3. Osteopenia of both hips    4. Interstitial cystitis    5. Pure hypercholesterolemia    6. GERD without esophagitis         Plan:       Urethritis  -     Urinalysis, Reflex to Urine Culture Urine, Clean Catch  -     cefUROXime (CEFTIN) 500 MG tablet; Take 1 tablet (500 mg total) by mouth 2 (two) times daily.  Dispense: 20 tablet; Refill: 0  -     phenazopyridine (PYRIDIUM) 100 MG tablet; Take 1 tablet (100 mg total) by mouth 3 (three) times daily as needed for Pain.  Dispense: 9 tablet; Refill: 0    Prediabetes    Osteopenia of both hips  -     alendronate (FOSAMAX) 70 MG tablet; TAKE 1 TABLET EVERY 7 DAYS  Dispense: 12 tablet; Refill: 4    Interstitial cystitis  -     amitriptyline (ELAVIL) 25 MG tablet; Take 1 tablet (25 mg total) by mouth 3 (three) times daily.  Dispense: 270 tablet; Refill: 1    Pure hypercholesterolemia  -     ezetimibe (ZETIA) 10 mg tablet; Take 1 tablet (10 mg total) by mouth every evening.  Dispense: 90 tablet; Refill: 1    GERD without esophagitis  -     pantoprazole (PROTONIX) 40 MG  tablet; Take 1 tablet (40 mg total) by mouth once daily.  Dispense: 90 tablet; Refill: 1    Other orders  -     cetirizine (ZYRTEC) 10 MG tablet; Take 1 tablet (10 mg total) by mouth once daily.  Dispense: 90 tablet; Refill: 2    Follow up in about 4 weeks (around 12/6/2022) for FOLLOW UP LABS, FOLLOW-UP STATUS, FOLLOW UP MEDICATIONS.        11/8/2022 Elda Paredes M.D.

## 2022-11-16 NOTE — PROGRESS NOTES
Subjective:       Patient ID: Fransisca Frazier is a 77 y.o. female.    Chief Complaint: estring    HPI  Fransisca Frazier is a 77 y.o. female.who presents today for routine estring change.  She was last seen on 08/18/22.  At that time, she had just gotten over a UTI.  Her UA at that visit was negative.  She was still having some irritation at the urethra.  She felt that it improved for awhile, but she went to see Dr. Paredes last week and was having issues with urethritis for 3 weeks prior to the visit.  Dr. Paredes sent her urine for a culture and it was positive for klebsiella pneumonia.  She is currently taking ceftin.  She feels that she is doing well.  Her UA is negative.  She denies any vaginal complaints/concerns.  She is just concerned about getting rid of the klebsiella.      Review of Systems   Constitutional:  Negative for activity change, fever and unexpected weight change.   HENT:  Negative for hearing loss.    Eyes:  Negative for visual disturbance.   Respiratory:  Negative for shortness of breath and wheezing.    Cardiovascular:  Negative for chest pain, palpitations and leg swelling.   Gastrointestinal:  Negative for abdominal pain, constipation and diarrhea.   Genitourinary:  Positive for frequency. Negative for dyspareunia, dysuria, urgency, vaginal bleeding and vaginal discharge.   Musculoskeletal:  Negative for gait problem and neck pain.   Skin:  Negative for rash and wound.   Allergic/Immunologic: Negative for immunocompromised state.   Neurological:  Negative for tremors, speech difficulty and weakness.   Hematological:  Does not bruise/bleed easily.   Psychiatric/Behavioral:  Negative for agitation and confusion.      Objective:      Physical Exam  Vitals reviewed. Exam conducted with a chaperone present.   Constitutional:       General: She is not in acute distress.     Appearance: She is well-developed.   HENT:      Head: Normocephalic and atraumatic.   Neck:      Thyroid: No thyromegaly.    Pulmonary:      Effort: Pulmonary effort is normal. No respiratory distress.   Abdominal:      Palpations: Abdomen is soft.      Tenderness: There is no abdominal tenderness.      Hernia: No hernia is present.   Musculoskeletal:         General: Normal range of motion.      Cervical back: Normal range of motion.   Skin:     General: Skin is warm and dry.      Findings: No rash.   Neurological:      Mental Status: She is alert and oriented to person, place, and time.   Psychiatric:         Mood and Affect: Mood normal.         Behavior: Behavior normal.         Thought Content: Thought content normal.     Pelvic Exam:  V: No lesions. No palpable nodes.   Va: no discharge or bleeding.  Good length.  Dominant midline cystocele Ba=0  Meatus:No caruncle or stenosis  Urethra: Non tender. No suburethral masses.  Cx/Cuff: Normal   Uterus: small, non-tender  Ad: No mass or tenderness.  Levators :Symmetrical. Normal tone. Non tender.  BL: Non tender  RV: No hemorrhoids.      Assessment:       1. Urinary frequency    2. Voiding dysfunction    3. Cystocele, midline    4. Pessary maintenance    5. Atrophic vaginitis            Procedure note- estring pessary removed and discarded.  After betadine irrigation of the vagina, a new estring pessary which the pt provided was reinserted into the vagina.  Pt ambulated in the room and denies any discomfort.  NP reminded pt that the first 24-48 hours are the most likely time for the pessary to fall out and to monitor the toilet before flushing.  Pt verbalized understanding.      Plan:       Urinary frequency- monitor  -     POCT Urinalysis(Instrument)    Voiding dysfunction- monitor    Cystocele, midline-  continue with estring    Pessary maintenance- as noted above    Atrophic vaginitis- as noted above    RTC 3 months

## 2022-12-08 NOTE — PROGRESS NOTES
SUBJECTIVE:    Patient ID: Fransisca Frazier is a 77 y.o. female.    Chief Complaint: Medication Refill and Hypertension    Medication Refill  Pertinent negatives include no abdominal pain, arthralgias, chest pain, chills, congestion, coughing, diaphoresis, fatigue, fever, headaches, joint swelling, myalgias, nausea, neck pain, numbness, sore throat, vomiting or weakness.   Hypertension  Pertinent negatives include no chest pain, headaches, neck pain, palpitations or shortness of breath.     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-she practices good uro-genital hygiene-a month ago she had good response to ceftin-recent UTI secondary to Klebsiella-she is having some spontaneous leakage on the way to the bathroom-    Blood sugar-hesitant to take Ozempic due to possibility of eye involvement    Office Visit on 11/16/2022   Component Date Value Ref Range Status    Color, POC UA 11/16/2022 Yellow  Yellow, Straw, Colorless Final-Edited    Clarity, POC UA 11/16/2022 Clear  Clear Final-Edited    Glucose, POC UA 11/16/2022 Negative  Negative Final-Edited    Bilirubin, POC UA 11/16/2022 Negative  Negative Final-Edited    Ketones, POC UA 11/16/2022 Negative  Negative Final-Edited    Spec Grav POC UA 11/16/2022 1.020  1.005 - 1.030 Corrected    Blood, POC UA 11/16/2022 Negative  Negative Corrected    pH, POC UA 11/16/2022 5.5  5.0 - 8.0 Final-Edited    Protein, POC UA 11/16/2022 Negative  Negative Final-Edited    Urobilinogen, POC UA 11/16/2022 0.2  <=1.0 Final-Edited    Nitrite, POC UA 11/16/2022 Negative  Negative Final-Edited    WBC, POC UA 11/16/2022 Negative  Negative Final-Edited   Lab Visit on 11/10/2022   Component Date Value Ref Range Status    Specimen UA 11/10/2022 Urine, Clean Catch   Final    Color, UA 11/10/2022 Yellow  Yellow, Straw,  Sarah Final    Appearance, UA 11/10/2022 Hazy (A)  Clear Final    pH, UA 11/10/2022 6.0  5.0 - 8.0 Final    Specific Gravity, UA 11/10/2022 1.015  1.005 - 1.030 Final    Protein, UA 11/10/2022 Negative  Negative Final    Glucose, UA 11/10/2022 Negative  Negative Final    Ketones, UA 11/10/2022 Negative  Negative Final    Bilirubin (UA) 11/10/2022 Negative  Negative Final    Occult Blood UA 11/10/2022 Negative  Negative Final    Nitrite, UA 11/10/2022 Positive (A)  Negative Final    Urobilinogen, UA 11/10/2022 Negative  Negative EU/dL Final    Leukocytes, UA 11/10/2022 Trace (A)  Negative Final    RBC, UA 11/10/2022 2  0 - 4 /hpf Final    WBC, UA 11/10/2022 11 (H)  0 - 5 /hpf Final    Bacteria 11/10/2022 Many (A)  None-Occ /hpf Final    Squam Epithel, UA 11/10/2022 3  /hpf Final    Hyaline Casts, UA 11/10/2022 7 (A)  0-1/lpf /lpf Final    Microscopic Comment 11/10/2022 SEE COMMENT   Final    Urine Culture, Routine 11/10/2022  (A)   Final                    Value:KLEBSIELLA PNEUMONIAE  >100,000 cfu/ml     Office Visit on 08/18/2022   Component Date Value Ref Range Status    Color, UA 08/18/2022 Yellow   Final    pH, UA 08/18/2022 5   Final    WBC, UA 08/18/2022 neg   Final    Nitrite, UA 08/18/2022 neg   Final    Protein, POC 08/18/2022 neg   Final    Glucose, UA 08/18/2022 neg   Final    Ketones, UA 08/18/2022 neg   Final    Urobilinogen, UA 08/18/2022 neg   Final    Bilirubin, POC 08/18/2022 neg   Final    Blood, UA 08/18/2022 neg   Final    Clarity, UA 08/18/2022 Clear   Final    Spec Grav UA 08/18/2022 1.010   Final   Hospital Outpatient Visit on 06/02/2022   Component Date Value Ref Range Status    Sodium 06/02/2022 136  136 - 145 mmol/L Final    Potassium 06/02/2022 3.8  3.5 - 5.1 mmol/L Final    Chloride 06/02/2022 99  95 - 110 mmol/L Final    CO2 06/02/2022 26  23 - 29 mmol/L Final    Glucose 06/02/2022 104  70 - 110 mg/dL Final    BUN 06/02/2022 18  8 - 23 mg/dL Final    Creatinine 06/02/2022 1.2  0.5 - 1.4  mg/dL Final    Calcium 06/02/2022 9.7  8.7 - 10.5 mg/dL Final    Total Protein 06/02/2022 7.9  6.0 - 8.4 g/dL Final    Albumin 06/02/2022 4.1  3.5 - 5.2 g/dL Final    Total Bilirubin 06/02/2022 0.8  0.1 - 1.0 mg/dL Final    Alkaline Phosphatase 06/02/2022 50 (L)  55 - 135 U/L Final    AST 06/02/2022 28  10 - 40 U/L Final    ALT 06/02/2022 19  10 - 44 U/L Final    Anion Gap 06/02/2022 11  8 - 16 mmol/L Final    eGFR if African American 06/02/2022 50.0 (A)  >60 mL/min/1.73 m^2 Final    eGFR if non African American 06/02/2022 43.4 (A)  >60 mL/min/1.73 m^2 Final    WBC 06/02/2022 5.45  3.90 - 12.70 K/uL Final    RBC 06/02/2022 4.41  4.00 - 5.40 M/uL Final    Hemoglobin 06/02/2022 13.9  12.0 - 16.0 g/dL Final    Hematocrit 06/02/2022 42.7  37.0 - 48.5 % Final    MCV 06/02/2022 97  82 - 98 fL Final    MCH 06/02/2022 31.5 (H)  27.0 - 31.0 pg Final    MCHC 06/02/2022 32.6  32.0 - 36.0 g/dL Final    RDW 06/02/2022 13.5  11.5 - 14.5 % Final    Platelets 06/02/2022 285  150 - 450 K/uL Final    MPV 06/02/2022 11.6  9.2 - 12.9 fL Final    Immature Granulocytes 06/02/2022 0.4  0.0 - 0.5 % Final    Gran # (ANC) 06/02/2022 2.9  1.8 - 7.7 K/uL Final    Immature Grans (Abs) 06/02/2022 0.02  0.00 - 0.04 K/uL Final    Lymph # 06/02/2022 1.1  1.0 - 4.8 K/uL Final    Mono # 06/02/2022 0.9  0.3 - 1.0 K/uL Final    Eos # 06/02/2022 0.4  0.0 - 0.5 K/uL Final    Baso # 06/02/2022 0.03  0.00 - 0.20 K/uL Final    nRBC 06/02/2022 0  0 /100 WBC Final    Gran % 06/02/2022 53.3  38.0 - 73.0 % Final    Lymph % 06/02/2022 20.9  18.0 - 48.0 % Final    Mono % 06/02/2022 16.9 (H)  4.0 - 15.0 % Final    Eosinophil % 06/02/2022 7.9  0.0 - 8.0 % Final    Basophil % 06/02/2022 0.6  0.0 - 1.9 % Final    Differential Method 06/02/2022 Automated   Final   Lab Visit on 05/24/2022   Component Date Value Ref Range Status    Lipase 05/24/2022 34  4 - 60 U/L Final    Amylase 05/24/2022 56  20 - 110 U/L Final    Specimen UA 05/24/2022 Urine, Clean Catch   Final     Color, UA 05/24/2022 Yellow  Yellow, Straw, Sarah Final    Appearance, UA 05/24/2022 Clear  Clear Final    pH, UA 05/24/2022 7.0  5.0 - 8.0 Final    Specific Gravity, UA 05/24/2022 1.015  1.005 - 1.030 Final    Protein, UA 05/24/2022 Negative  Negative Final    Glucose, UA 05/24/2022 Negative  Negative Final    Ketones, UA 05/24/2022 Negative  Negative Final    Bilirubin (UA) 05/24/2022 Negative  Negative Final    Occult Blood UA 05/24/2022 Negative  Negative Final    Nitrite, UA 05/24/2022 Negative  Negative Final    Urobilinogen, UA 05/24/2022 Negative  Negative EU/dL Final    Leukocytes, UA 05/24/2022 Negative  Negative Final    Microalbumin, Urine 05/24/2022 2.3  <19.9 ug/mL Final    Creatinine, Urine 05/24/2022 84.0  15.0 - 325.0 mg/dL Final    Microalb/Creat Ratio 05/24/2022 2.7  0.0 - 30.0 ug/mg Final   Office Visit on 05/12/2022   Component Date Value Ref Range Status    Color, UA 05/12/2022 Yellow   Final    pH, UA 05/12/2022 5   Final    WBC, UA 05/12/2022 neg   Final    Nitrite, UA 05/12/2022 neg   Final    Protein, POC 05/12/2022 neg   Final    Glucose, UA 05/12/2022 neg   Final    Ketones, UA 05/12/2022 neg   Final    Urobilinogen, UA 05/12/2022 0.2   Final    Bilirubin, POC 05/12/2022 neg   Final    Blood, UA 05/12/2022 neg   Final    Clarity, UA 05/12/2022 Clear   Final    Spec Grav UA 05/12/2022 1.015   Final   Lab Visit on 04/22/2022   Component Date Value Ref Range Status    Sodium 04/22/2022 137  136 - 145 mmol/L Final    Potassium 04/22/2022 3.9  3.5 - 5.1 mmol/L Final    Chloride 04/22/2022 103  95 - 110 mmol/L Final    CO2 04/22/2022 24  23 - 29 mmol/L Final    Glucose 04/22/2022 114 (H)  70 - 110 mg/dL Final    BUN 04/22/2022 22  8 - 23 mg/dL Final    Creatinine 04/22/2022 1.0  0.5 - 1.4 mg/dL Final    Calcium 04/22/2022 9.5  8.7 - 10.5 mg/dL Final    Anion Gap 04/22/2022 10  8 - 16 mmol/L Final    eGFR if African American 04/22/2022 >60.0  >60 mL/min/1.73 m^2 Final    eGFR if non African  American 04/22/2022 54.1 (A)  >60 mL/min/1.73 m^2 Final   Office Visit on 02/21/2022   Component Date Value Ref Range Status    Glucose 08/18/2022 101 (H)  65 - 99 mg/dL Final    BUN 08/18/2022 16  7 - 25 mg/dL Final    Creatinine 08/18/2022 1.22 (H)  0.60 - 1.00 mg/dL Final    eGFR 08/18/2022 45 (L)  > OR = 60 mL/min/1.73m2 Final    BUN/Creatinine Ratio 08/18/2022 13  6 - 22 (calc) Final    Sodium 08/18/2022 138  135 - 146 mmol/L Final    Potassium 08/18/2022 4.2  3.5 - 5.3 mmol/L Final    Chloride 08/18/2022 103  98 - 110 mmol/L Final    CO2 08/18/2022 24  20 - 32 mmol/L Final    Calcium 08/18/2022 9.6  8.6 - 10.4 mg/dL Final    Total Protein 08/18/2022 7.7  6.1 - 8.1 g/dL Final    Albumin 08/18/2022 4.3  3.6 - 5.1 g/dL Final    Globulin, Total 08/18/2022 3.4  1.9 - 3.7 g/dL (calc) Final    Albumin/Globulin Ratio 08/18/2022 1.3  1.0 - 2.5 (calc) Final    Total Bilirubin 08/18/2022 0.5  0.2 - 1.2 mg/dL Final    Alkaline Phosphatase 08/18/2022 66  37 - 153 U/L Final    AST 08/18/2022 22  10 - 35 U/L Final    ALT 08/18/2022 11  6 - 29 U/L Final    Hemoglobin A1C 08/18/2022 5.9 (H)  <5.7 % of total Hgb Final    WBC 08/18/2022 7.2  3.8 - 10.8 Thousand/uL Final    RBC 08/18/2022 4.66  3.80 - 5.10 Million/uL Final    Hemoglobin 08/18/2022 14.7  11.7 - 15.5 g/dL Final    Hematocrit 08/18/2022 44.6  35.0 - 45.0 % Final    MCV 08/18/2022 95.7  80.0 - 100.0 fL Final    MCH 08/18/2022 31.5  27.0 - 33.0 pg Final    MCHC 08/18/2022 33.0  32.0 - 36.0 g/dL Final    RDW 08/18/2022 12.9  11.0 - 15.0 % Final    Platelets 08/18/2022 278  140 - 400 Thousand/uL Final    MPV 08/18/2022 12.2  7.5 - 12.5 fL Final    Neutrophils, Abs 08/18/2022 4,262  1,500 - 7,800 cells/uL Final    Lymph # 08/18/2022 1,519  850 - 3,900 cells/uL Final    Mono # 08/18/2022 1,015 (H)  200 - 950 cells/uL Final    Eos # 08/18/2022 360  15 - 500 cells/uL Final    Baso # 08/18/2022 43  0 - 200 cells/uL Final    Neutrophils Relative 08/18/2022 59.2  % Final     Lymph % 08/18/2022 21.1  % Final    Mono % 08/18/2022 14.1  % Final    Eosinophil % 08/18/2022 5.0  % Final    Basophil % 08/18/2022 0.6  % Final    Cholesterol 08/18/2022 170  <200 mg/dL Final    HDL 08/18/2022 50  > OR = 50 mg/dL Final    Triglycerides 08/18/2022 165 (H)  <150 mg/dL Final    LDL Cholesterol 08/18/2022 94  mg/dL (calc) Final    HDL/Cholesterol Ratio 08/18/2022 3.4  <5.0 (calc) Final    Non HDL Chol. (LDL+VLDL) 08/18/2022 120  <130 mg/dL (calc) Final    Creatinine, Urine 08/18/2022 126  20 - 275 mg/dL Final    Microalb, Ur 08/18/2022 0.7  See Note: mg/dL Final    Microalb/Creat Ratio 08/18/2022 6  <30 mcg/mg creat Final    WBC Casts, UA 08/18/2022 NONE SEEN  < OR = 5 /HPF Final    RBC Casts, UA 08/18/2022 NONE SEEN  < OR = 2 /HPF Final    Squam Epithel, UA 08/18/2022 6-10 (A)  < OR = 5 /HPF Final    Bacteria, UA 08/18/2022 FEW (A)  NONE SEEN /HPF Final    Hyaline Casts, UA 08/18/2022 NONE SEEN  NONE SEEN /LPF Final    Yeast, UA 08/18/2022 FEW (A)  NONE SEEN /HPF Final    Service Cmt: 08/18/2022    Final   Office Visit on 02/10/2022   Component Date Value Ref Range Status    Color, UA 02/10/2022 Yellow   Final    pH, UA 02/10/2022 5   Final    WBC, UA 02/10/2022 negative   Final    Nitrite, UA 02/10/2022 negative   Final    Protein, POC 02/10/2022 trace   Final    Glucose, UA 02/10/2022 negative   Final    Ketones, UA 02/10/2022 negative   Final    Urobilinogen, UA 02/10/2022 normal   Final    Bilirubin, POC 02/10/2022 2+   Final    Blood, UA 02/10/2022 negative   Final    Clarity, UA 02/10/2022 Slightly Cloudy   Final    Spec Grav UA 02/10/2022 1.020   Final       Past Medical History:   Diagnosis Date    Back pain     Cystitis, interstitial     Hypertension     Interstitial cystitis 1998    Pneumonia     viral and bacterial     Rosacea     Uncontrolled type 2 diabetes mellitus without complication, without long-term current use of insulin 1/22/2018    Wears glasses     Wears partial dentures      upper     Past Surgical History:   Procedure Laterality Date    CHOLECYSTECTOMY  02/21/2018    CYSTOSCOPY      D & C      DILATION AND CURETTAGE OF UTERUS      disk repair      L5 S1    ENDOSCOPIC ULTRASOUND OF UPPER GASTROINTESTINAL TRACT N/A 6/7/2022    Procedure: ULTRASOUND, UPPER GI TRACT, ENDOSCOPIC;  Surgeon: Alen Kendrick III, MD;  Location: [facility];  Service: Endoscopy;  Laterality: N/A;    EYE SURGERY      bilat cataract     Family History   Problem Relation Age of Onset    Cancer Father     Cancer Sister     Cancer Brother     Cancer Brother        Marital Status:   Alcohol History:  reports current alcohol use.  Tobacco History:  reports that she quit smoking about 29 years ago. Her smoking use included cigarettes. She has a 32.00 pack-year smoking history. She has never used smokeless tobacco.  Drug History:  reports no history of drug use.    Review of patient's allergies indicates:  No Known Allergies    Current Outpatient Medications:     alendronate (FOSAMAX) 70 MG tablet, TAKE 1 TABLET EVERY 7 DAYS, Disp: 12 tablet, Rfl: 4    brimonidine (MIRVASO) 0.33 % Gel, Apply 1 application topically every morning. To red areas on face, Disp: 90 g, Rfl: 3    cetirizine (ZYRTEC) 10 MG tablet, Take 1 tablet (10 mg total) by mouth once daily., Disp: 90 tablet, Rfl: 2    docusate sodium (COLACE) 100 MG capsule, , Disp: , Rfl:     estradioL (ESTRING) 2 mg (7.5 mcg /24 hour) vaginal ring, Place 2 mg vaginally every 3 (three) months. for 90 doses, Disp: 1 each, Rfl: 3    ezetimibe (ZETIA) 10 mg tablet, Take 1 tablet (10 mg total) by mouth every evening., Disp: 90 tablet, Rfl: 1    glucosamine-chondroit-vit C-Mn 500-400 mg capsule, , Disp: , Rfl:     hydrocortisone 2.5 % cream, aaa once every other day alternating with triamcinolone, Disp: 30 g, Rfl: 1    hyoscyamine (OSCIMIN SL) 0.125 mg Subl, DISSOLVE 1 TABLET UNDER THE TONGUE THREE TIMES A DAY AS NEEDED, Disp: 270 tablet, Rfl: 1     nystatin-triamcinolone (MYCOLOG II) cream, Apply topically 4 (four) times daily. for 10 days, Disp: 60 g, Rfl: 1    pantoprazole (PROTONIX) 40 MG tablet, Take 1 tablet (40 mg total) by mouth once daily., Disp: 90 tablet, Rfl: 1    potassium chloride (KLOR-CON) 10 MEQ TbSR, Take 10 mEq by mouth once daily., Disp: , Rfl:     sucralfate (CARAFATE) 1 gram tablet, Take 1 tablet (1 g total) by mouth 4 (four) times daily., Disp: 360 tablet, Rfl: 1    timolol maleate 0.5% (TIMOPTIC) 0.5 % Drop, Place 1 drop into both eyes 2 (two) times daily., Disp: , Rfl:     valsartan (DIOVAN) 160 MG tablet, Take 1 tablet (160 mg total) by mouth once daily., Disp: 90 tablet, Rfl: 1    amitriptyline (ELAVIL) 25 MG tablet, Take 1 tablet (25 mg total) by mouth 3 (three) times daily., Disp: 270 tablet, Rfl: 1    Review of Systems   Constitutional:  Negative for activity change, appetite change, chills, diaphoresis, fatigue, fever and unexpected weight change.   HENT:  Negative for congestion, ear pain, hearing loss, nosebleeds, postnasal drip, rhinorrhea, sinus pressure, sinus pain, sneezing, sore throat, tinnitus, trouble swallowing and voice change.    Eyes:  Negative for photophobia, pain, discharge, itching and visual disturbance.   Respiratory:  Negative for apnea, cough, chest tightness, shortness of breath, wheezing and stridor.    Cardiovascular:  Negative for chest pain, palpitations and leg swelling.   Gastrointestinal:  Negative for abdominal distention, abdominal pain, blood in stool, constipation, diarrhea, nausea and vomiting.   Endocrine: Negative for cold intolerance, heat intolerance, polydipsia and polyuria.   Genitourinary:  Positive for frequency. Negative for difficulty urinating, dyspareunia, dysuria, flank pain, hematuria, menstrual problem, pelvic pain, urgency, vaginal discharge and vaginal pain.        HPI   Musculoskeletal:  Negative for arthralgias, back pain, joint swelling, myalgias, neck pain and neck stiffness.    Skin:  Negative for pallor.   Allergic/Immunologic: Negative for environmental allergies and food allergies.   Neurological:  Negative for dizziness, tremors, speech difficulty, weakness, light-headedness, numbness and headaches.   Hematological:  Does not bruise/bleed easily.   Psychiatric/Behavioral:  Negative for agitation, confusion, decreased concentration, dysphoric mood, sleep disturbance and suicidal ideas. The patient is not nervous/anxious.         Objective:      Vitals    Vitals - 1 value per visit 11/16/2022 11/16/2022 12/8/2022 12/8/2022 12/8/2022   SYSTOLIC - 147 - 132 132   DIASTOLIC - 63 - 68 70   Pulse - 68 - 68 -   Temp - - - 98.6 -   Resp - - - 16 -   SPO2 - - - 97 -   Weight (lb) - - - 260 -   Weight (kg) - - - 117.935 -   Height - - - 63 -   BMI (Calculated) - - - 46.1 -   VISIT REPORT - - - - -   Pain Score  0 - 0 - -   Some recent data might be hidden       Physical Exam  Constitutional:       General: She is not in acute distress.     Appearance: She is obese. She is not ill-appearing.   HENT:      Head: Normocephalic and atraumatic.   Eyes:      Extraocular Movements: Extraocular movements intact.      Conjunctiva/sclera: Conjunctivae normal.      Pupils: Pupils are equal, round, and reactive to light.   Neck:      Vascular: No carotid bruit.   Cardiovascular:      Rate and Rhythm: Normal rate and regular rhythm.      Pulses: Normal pulses.      Heart sounds: Normal heart sounds.   Pulmonary:      Effort: Pulmonary effort is normal.      Breath sounds: Normal breath sounds.   Abdominal:      General: Bowel sounds are normal.      Palpations: Abdomen is soft.      Tenderness: There is no abdominal tenderness.   Musculoskeletal:      Cervical back: Normal range of motion and neck supple.      Right lower leg: Edema (trace) present.      Left lower leg: Edema (trace) present.   Lymphadenopathy:      Cervical: No cervical adenopathy.   Skin:     General: Skin is warm and dry.   Neurological:       Mental Status: She is alert.   Psychiatric:         Mood and Affect: Mood normal.         Thought Content: Thought content normal.         Assessment:       1. Interstitial cystitis    2. UTI due to Klebsiella species    3. Pure hypercholesterolemia    4. Primary hypertension    5. Stage 3a chronic kidney disease    6. Need for pneumococcal vaccination           Plan:       Interstitial cystitis  -     Urinalysis, Reflex to Urine Culture Urine, Clean Catch  -     amitriptyline (ELAVIL) 25 MG tablet; Take 1 tablet (25 mg total) by mouth 3 (three) times daily.  Dispense: 270 tablet; Refill: 1  -     Urine culture; Future; Expected date: 12/08/2022    UTI due to Klebsiella species  -     Urine culture; Future; Expected date: 12/08/2022    Pure hypercholesterolemia  -     Lipid Panel; Future; Expected date: 12/08/2022    Primary hypertension  -     CBC Auto Differential; Future; Expected date: 12/08/2022  -     Comprehensive Metabolic Panel; Future; Expected date: 12/08/2022    Stage 3a chronic kidney disease  -     Comprehensive Metabolic Panel; Future; Expected date: 12/08/2022    Need for pneumococcal vaccination  -     (In Office Administered) Pneumococcal Conjugate Vaccine (20 Valent) (IM)    No follow-ups on file.        12/8/2022 Elda Paredes M.D.

## 2023-01-01 ENCOUNTER — PATIENT MESSAGE (OUTPATIENT)
Dept: FAMILY MEDICINE | Facility: CLINIC | Age: 78
End: 2023-01-01

## 2023-01-01 ENCOUNTER — HOSPITAL ENCOUNTER (EMERGENCY)
Facility: HOSPITAL | Age: 78
End: 2023-05-12
Attending: EMERGENCY MEDICINE
Payer: MEDICARE

## 2023-01-01 ENCOUNTER — OFFICE VISIT (OUTPATIENT)
Dept: FAMILY MEDICINE | Facility: CLINIC | Age: 78
End: 2023-01-01
Payer: MEDICARE

## 2023-01-01 ENCOUNTER — OFFICE VISIT (OUTPATIENT)
Dept: UROGYNECOLOGY | Facility: CLINIC | Age: 78
End: 2023-01-01
Payer: MEDICARE

## 2023-01-01 VITALS
HEART RATE: 74 BPM | OXYGEN SATURATION: 97 % | SYSTOLIC BLOOD PRESSURE: 134 MMHG | WEIGHT: 255 LBS | HEIGHT: 63 IN | BODY MASS INDEX: 45.18 KG/M2 | DIASTOLIC BLOOD PRESSURE: 86 MMHG | RESPIRATION RATE: 18 BRPM

## 2023-01-01 VITALS
BODY MASS INDEX: 45.17 KG/M2 | HEART RATE: 117 BPM | SYSTOLIC BLOOD PRESSURE: 154 MMHG | WEIGHT: 255 LBS | RESPIRATION RATE: 20 BRPM | DIASTOLIC BLOOD PRESSURE: 72 MMHG

## 2023-01-01 VITALS
HEIGHT: 63 IN | BODY MASS INDEX: 45.17 KG/M2 | DIASTOLIC BLOOD PRESSURE: 70 MMHG | HEART RATE: 100 BPM | SYSTOLIC BLOOD PRESSURE: 154 MMHG

## 2023-01-01 DIAGNOSIS — I10 PRIMARY HYPERTENSION: ICD-10-CM

## 2023-01-01 DIAGNOSIS — B96.1 KLEBSIELLA CYSTITIS: Primary | ICD-10-CM

## 2023-01-01 DIAGNOSIS — Z46.89 PESSARY MAINTENANCE: ICD-10-CM

## 2023-01-01 DIAGNOSIS — I46.9 CARDIOPULMONARY ARREST: Primary | ICD-10-CM

## 2023-01-01 DIAGNOSIS — E11.9 TYPE 2 DIABETES MELLITUS WITHOUT COMPLICATION, WITHOUT LONG-TERM CURRENT USE OF INSULIN: ICD-10-CM

## 2023-01-01 DIAGNOSIS — N39.8 VOIDING DYSFUNCTION: ICD-10-CM

## 2023-01-01 DIAGNOSIS — B96.89 UTI DUE TO KLEBSIELLA SPECIES: Primary | ICD-10-CM

## 2023-01-01 DIAGNOSIS — N81.11 CYSTOCELE, MIDLINE: ICD-10-CM

## 2023-01-01 DIAGNOSIS — Z87.440 HISTORY OF RECURRENT UTIS: ICD-10-CM

## 2023-01-01 DIAGNOSIS — N30.10 INTERSTITIAL CYSTITIS: ICD-10-CM

## 2023-01-01 DIAGNOSIS — K21.9 GERD WITHOUT ESOPHAGITIS: ICD-10-CM

## 2023-01-01 DIAGNOSIS — N30.90 KLEBSIELLA CYSTITIS: Primary | ICD-10-CM

## 2023-01-01 DIAGNOSIS — N95.2 ATROPHIC VAGINITIS: ICD-10-CM

## 2023-01-01 DIAGNOSIS — R35.0 URINARY FREQUENCY: Primary | ICD-10-CM

## 2023-01-01 DIAGNOSIS — N39.0 UTI DUE TO KLEBSIELLA SPECIES: Primary | ICD-10-CM

## 2023-01-01 DIAGNOSIS — I10 HYPERTENSION, UNSPECIFIED TYPE: ICD-10-CM

## 2023-01-01 LAB
ALLENS TEST: ABNORMAL
BACTERIA UR CULT: ABNORMAL
BILIRUBIN, UA POC OHS: NEGATIVE
BLOOD, UA POC OHS: NEGATIVE
CLARITY, UA POC OHS: ABNORMAL
COLOR, UA POC OHS: YELLOW
DELSYS: ABNORMAL
ERYTHROCYTE [SEDIMENTATION RATE] IN BLOOD BY WESTERGREN METHOD: 26 MM/H
FIO2: 100
GLUCOSE SERPL-MCNC: 121 MG/DL (ref 70–110)
GLUCOSE SERPL-MCNC: 268 MG/DL (ref 70–110)
GLUCOSE, UA POC OHS: NEGATIVE
HCO3 UR-SCNC: 19.3 MMOL/L (ref 24–28)
HCT VFR BLD CALC: 41 %PCV (ref 36–54)
KETONES, UA POC OHS: NEGATIVE
LEUKOCYTES, UA POC OHS: NEGATIVE
MIN VOL: 18
MODE: ABNORMAL
NITRITE, UA POC OHS: NEGATIVE
PCO2 BLDA: 58.3 MMHG (ref 35–45)
PEEP: 8
PH SMN: 7.13 [PH] (ref 7.35–7.45)
PH, UA POC OHS: 6
PIP: 32
PO2 BLDA: 112 MMHG (ref 80–100)
POC BE: -10 MMOL/L
POC IONIZED CALCIUM: 1.8 MMOL/L (ref 1.06–1.42)
POC SATURATED O2: 96 % (ref 95–100)
POC TCO2: 21 MMOL/L (ref 23–27)
POTASSIUM BLD-SCNC: 4.8 MMOL/L (ref 3.5–5.1)
PROTEIN, UA POC OHS: NEGATIVE
SAMPLE: ABNORMAL
SITE: ABNORMAL
SODIUM BLD-SCNC: 139 MMOL/L (ref 136–145)
SPECIFIC GRAVITY, UA POC OHS: 1.02
UROBILINOGEN, UA POC OHS: 0.2
VT: 400

## 2023-01-01 PROCEDURE — 99900035 HC TECH TIME PER 15 MIN (STAT)

## 2023-01-01 PROCEDURE — 99215 OFFICE O/P EST HI 40 MIN: CPT | Performed by: INTERNAL MEDICINE

## 2023-01-01 PROCEDURE — 92950 HEART/LUNG RESUSCITATION CPR: CPT

## 2023-01-01 PROCEDURE — 85014 HEMATOCRIT: CPT

## 2023-01-01 PROCEDURE — 99999 PR PBB SHADOW E&M-EST. PATIENT-LVL III: CPT | Mod: PBBFAC,,, | Performed by: NURSE PRACTITIONER

## 2023-01-01 PROCEDURE — 36600 WITHDRAWAL OF ARTERIAL BLOOD: CPT

## 2023-01-01 PROCEDURE — 82803 BLOOD GASES ANY COMBINATION: CPT

## 2023-01-01 PROCEDURE — 99999 PR PBB SHADOW E&M-EST. PATIENT-LVL III: ICD-10-PCS | Mod: PBBFAC,,, | Performed by: NURSE PRACTITIONER

## 2023-01-01 PROCEDURE — 82962 GLUCOSE BLOOD TEST: CPT

## 2023-01-01 PROCEDURE — 25000003 PHARM REV CODE 250: Performed by: EMERGENCY MEDICINE

## 2023-01-01 PROCEDURE — 84132 ASSAY OF SERUM POTASSIUM: CPT

## 2023-01-01 PROCEDURE — 99213 OFFICE O/P EST LOW 20 MIN: CPT | Mod: PBBFAC,PO | Performed by: NURSE PRACTITIONER

## 2023-01-01 PROCEDURE — 99213 PR OFFICE/OUTPT VISIT, EST, LEVL III, 20-29 MIN: ICD-10-PCS | Mod: S$PBB,,, | Performed by: NURSE PRACTITIONER

## 2023-01-01 PROCEDURE — 84295 ASSAY OF SERUM SODIUM: CPT

## 2023-01-01 PROCEDURE — 99214 PR OFFICE/OUTPT VISIT, EST, LEVL IV, 30-39 MIN: ICD-10-PCS | Mod: S$PBB,AQ,, | Performed by: INTERNAL MEDICINE

## 2023-01-01 PROCEDURE — 99213 OFFICE O/P EST LOW 20 MIN: CPT | Mod: S$PBB,,, | Performed by: NURSE PRACTITIONER

## 2023-01-01 PROCEDURE — 99291 CRITICAL CARE FIRST HOUR: CPT

## 2023-01-01 PROCEDURE — 81003 URINALYSIS AUTO W/O SCOPE: CPT | Mod: PBBFAC,PO | Performed by: NURSE PRACTITIONER

## 2023-01-01 PROCEDURE — 63600175 PHARM REV CODE 636 W HCPCS: Performed by: EMERGENCY MEDICINE

## 2023-01-01 PROCEDURE — 94002 VENT MGMT INPAT INIT DAY: CPT

## 2023-01-01 PROCEDURE — 82330 ASSAY OF CALCIUM: CPT

## 2023-01-01 PROCEDURE — 99214 OFFICE O/P EST MOD 30 MIN: CPT | Mod: S$PBB,AQ,, | Performed by: INTERNAL MEDICINE

## 2023-01-01 RX ORDER — ESTRADIOL 2 MG/1
2 SYSTEM VAGINAL
Qty: 1 EACH | Refills: 3 | Status: SHIPPED | OUTPATIENT
Start: 2023-01-01 | End: 2045-01-22

## 2023-01-01 RX ORDER — SODIUM BICARBONATE 1 MEQ/ML
SYRINGE (ML) INTRAVENOUS
Status: DISCONTINUED
Start: 2023-01-01 | End: 2023-05-13 | Stop reason: HOSPADM

## 2023-01-01 RX ORDER — VALSARTAN 160 MG/1
160 TABLET ORAL DAILY
Qty: 90 TABLET | Refills: 0 | Status: SHIPPED | OUTPATIENT
Start: 2023-01-01

## 2023-01-01 RX ORDER — EPINEPHRINE 0.1 MG/ML
INJECTION INTRAVENOUS CODE/TRAUMA/SEDATION MEDICATION
Status: COMPLETED | OUTPATIENT
Start: 2023-01-01 | End: 2023-01-01

## 2023-01-01 RX ORDER — CIPROFLOXACIN 500 MG/1
500 TABLET ORAL 2 TIMES DAILY
Qty: 20 TABLET | Refills: 0 | Status: SHIPPED | OUTPATIENT
Start: 2023-01-01

## 2023-01-01 RX ORDER — SODIUM BICARBONATE 1 MEQ/ML
SYRINGE (ML) INTRAVENOUS CODE/TRAUMA/SEDATION MEDICATION
Status: COMPLETED | OUTPATIENT
Start: 2023-01-01 | End: 2023-01-01

## 2023-01-01 RX ORDER — CALCIUM CHLORIDE INJECTION 100 MG/ML
INJECTION, SOLUTION INTRAVENOUS CODE/TRAUMA/SEDATION MEDICATION
Status: COMPLETED | OUTPATIENT
Start: 2023-01-01 | End: 2023-01-01

## 2023-01-01 RX ADMIN — SODIUM BICARBONATE 50 MEQ: 84 INJECTION, SOLUTION INTRAVENOUS at 05:05

## 2023-01-01 RX ADMIN — EPINEPHRINE 1 MG: 0.1 INJECTION, SOLUTION ENDOTRACHEAL; INTRACARDIAC; INTRAVENOUS at 06:05

## 2023-01-01 RX ADMIN — SODIUM CHLORIDE 1000 ML: 9 INJECTION, SOLUTION INTRAVENOUS at 05:05

## 2023-01-01 RX ADMIN — DEXTROSE 0.03 MCG/KG/MIN: 50 INJECTION, SOLUTION INTRAVENOUS at 06:05

## 2023-01-01 RX ADMIN — CALCIUM CHLORIDE 1 G: 100 INJECTION, SOLUTION INTRAVENOUS at 05:05

## 2023-01-01 RX ADMIN — EPINEPHRINE 1 MG: 0.1 INJECTION, SOLUTION ENDOTRACHEAL; INTRACARDIAC; INTRAVENOUS at 05:05

## 2023-01-01 RX ADMIN — SODIUM BICARBONATE 50 MEQ: 84 INJECTION, SOLUTION INTRAVENOUS at 06:05

## 2023-01-01 RX ADMIN — EPINEPHRINE 0.02 MCG/KG/MIN: 1 INJECTION INTRAMUSCULAR; INTRAVENOUS; SUBCUTANEOUS at 06:05

## 2023-01-25 NOTE — PROGRESS NOTES
SUBJECTIVE:    Patient ID: Fransisca Frazier is a 77 y.o. female.    Chief Complaint: Hypertension, Results (Lab results), and Hearing Problem    HPI    Follow-up labs    WBC 6.1 H/H 14.5/44.5 platelets 241,000 glucose 103 BUN 15 Cr 11.4 A1C 5.9--in Dec she had a Klebsiella UTI, now still has some sx but no bladder sx    Generally she has been feeling fairly well-she stays home much of the time-    Hypertension-has been controlled-    Hearing-hearing is reduced-notes it with TV or speaking with -no check    Last 3 sets of Vitals    Vitals - 1 value per visit 12/8/2022 1/26/2023 1/26/2023   SYSTOLIC 132 - 134   DIASTOLIC 70 - 86   Pulse - - 74   Temp - - -   Resp - - 18   SPO2 - - 97   Weight (lb) - - 255   Weight (kg) - - 115.667   Height - - 63   BMI (Calculated) - - 45.2   VISIT REPORT - - -   Pain Score  - 0 -   Some recent data might be hidden        Office Visit on 12/08/2022   Component Date Value Ref Range Status    WBC 12/13/2022 6.1  3.8 - 10.8 Thousand/uL Final    RBC 12/13/2022 4.60  3.80 - 5.10 Million/uL Final    Hemoglobin 12/13/2022 14.5  11.7 - 15.5 g/dL Final    Hematocrit 12/13/2022 44.5  35.0 - 45.0 % Final    MCV 12/13/2022 96.7  80.0 - 100.0 fL Final    MCH 12/13/2022 31.5  27.0 - 33.0 pg Final    MCHC 12/13/2022 32.6  32.0 - 36.0 g/dL Final    RDW 12/13/2022 13.0  11.0 - 15.0 % Final    Platelets 12/13/2022 241  140 - 400 Thousand/uL Final    MPV 12/13/2022 11.9  7.5 - 12.5 fL Final    Neutrophils, Abs 12/13/2022 3,007  1,500 - 7,800 cells/uL Final    Lymph # 12/13/2022 1,415  850 - 3,900 cells/uL Final    Mono # 12/13/2022 885  200 - 950 cells/uL Final    Eos # 12/13/2022 750 (H)  15 - 500 cells/uL Final    Baso # 12/13/2022 43  0 - 200 cells/uL Final    Neutrophils Relative 12/13/2022 49.3  % Final    Lymph % 12/13/2022 23.2  % Final    Mono % 12/13/2022 14.5  % Final    Eosinophil % 12/13/2022 12.3  % Final    Basophil % 12/13/2022 0.7  % Final    Glucose 12/13/2022 103 (H)  65 - 99  mg/dL Final    BUN 12/13/2022 15  7 - 25 mg/dL Final    Creatinine 12/13/2022 1.14 (H)  0.60 - 1.00 mg/dL Final    eGFR 12/13/2022 49 (L)  > OR = 60 mL/min/1.73m2 Final    BUN/Creatinine Ratio 12/13/2022 13  6 - 22 (calc) Final    Sodium 12/13/2022 139  135 - 146 mmol/L Final    Potassium 12/13/2022 4.2  3.5 - 5.3 mmol/L Final    Chloride 12/13/2022 102  98 - 110 mmol/L Final    CO2 12/13/2022 31  20 - 32 mmol/L Final    Calcium 12/13/2022 10.0  8.6 - 10.4 mg/dL Final    Total Protein 12/13/2022 7.5  6.1 - 8.1 g/dL Final    Albumin 12/13/2022 4.1  3.6 - 5.1 g/dL Final    Globulin, Total 12/13/2022 3.4  1.9 - 3.7 g/dL (calc) Final    Albumin/Globulin Ratio 12/13/2022 1.2  1.0 - 2.5 (calc) Final    Total Bilirubin 12/13/2022 0.7  0.2 - 1.2 mg/dL Final    Alkaline Phosphatase 12/13/2022 64  37 - 153 U/L Final    AST 12/13/2022 20  10 - 35 U/L Final    ALT 12/13/2022 13  6 - 29 U/L Final    Cholesterol 12/13/2022 171  <200 mg/dL Final    HDL 12/13/2022 49 (L)  > OR = 50 mg/dL Final    Triglycerides 12/13/2022 151 (H)  <150 mg/dL Final    LDL Cholesterol 12/13/2022 97  mg/dL (calc) Final    HDL/Cholesterol Ratio 12/13/2022 3.5  <5.0 (calc) Final    Non HDL Chol. (LDL+VLDL) 12/13/2022 122  <130 mg/dL (calc) Final    Urine Culture, Routine 12/09/2022  (A)   Final   Office Visit on 11/16/2022   Component Date Value Ref Range Status    Color, POC UA 11/16/2022 Yellow  Yellow, Straw, Colorless Final-Edited    Clarity, POC UA 11/16/2022 Clear  Clear Final-Edited    Glucose, POC UA 11/16/2022 Negative  Negative Final-Edited    Bilirubin, POC UA 11/16/2022 Negative  Negative Final-Edited    Ketones, POC UA 11/16/2022 Negative  Negative Final-Edited    Spec Grav POC UA 11/16/2022 1.020  1.005 - 1.030 Corrected    Blood, POC UA 11/16/2022 Negative  Negative Corrected    pH, POC UA 11/16/2022 5.5  5.0 - 8.0 Final-Edited    Protein, POC UA 11/16/2022 Negative  Negative Final-Edited    Urobilinogen, POC UA 11/16/2022 0.2  <=1.0  Final-Edited    Nitrite, POC UA 11/16/2022 Negative  Negative Final-Edited    WBC, POC UA 11/16/2022 Negative  Negative Final-Edited   Lab Visit on 11/10/2022   Component Date Value Ref Range Status    Specimen UA 11/10/2022 Urine, Clean Catch   Final    Color, UA 11/10/2022 Yellow  Yellow, Straw, Sarah Final    Appearance, UA 11/10/2022 Hazy (A)  Clear Final    pH, UA 11/10/2022 6.0  5.0 - 8.0 Final    Specific Gravity, UA 11/10/2022 1.015  1.005 - 1.030 Final    Protein, UA 11/10/2022 Negative  Negative Final    Glucose, UA 11/10/2022 Negative  Negative Final    Ketones, UA 11/10/2022 Negative  Negative Final    Bilirubin (UA) 11/10/2022 Negative  Negative Final    Occult Blood UA 11/10/2022 Negative  Negative Final    Nitrite, UA 11/10/2022 Positive (A)  Negative Final    Urobilinogen, UA 11/10/2022 Negative  Negative EU/dL Final    Leukocytes, UA 11/10/2022 Trace (A)  Negative Final    RBC, UA 11/10/2022 2  0 - 4 /hpf Final    WBC, UA 11/10/2022 11 (H)  0 - 5 /hpf Final    Bacteria 11/10/2022 Many (A)  None-Occ /hpf Final    Squam Epithel, UA 11/10/2022 3  /hpf Final    Hyaline Casts, UA 11/10/2022 7 (A)  0-1/lpf /lpf Final    Microscopic Comment 11/10/2022 SEE COMMENT   Final    Urine Culture, Routine 11/10/2022  (A)   Final                    Value:KLEBSIELLA PNEUMONIAE  >100,000 cfu/ml     Office Visit on 08/18/2022   Component Date Value Ref Range Status    Color, UA 08/18/2022 Yellow   Final    pH, UA 08/18/2022 5   Final    WBC, UA 08/18/2022 neg   Final    Nitrite, UA 08/18/2022 neg   Final    Protein, POC 08/18/2022 neg   Final    Glucose, UA 08/18/2022 neg   Final    Ketones, UA 08/18/2022 neg   Final    Urobilinogen, UA 08/18/2022 neg   Final    Bilirubin, POC 08/18/2022 neg   Final    Blood, UA 08/18/2022 neg   Final    Clarity, UA 08/18/2022 Clear   Final    Spec Grav UA 08/18/2022 1.010   Final   Hospital Outpatient Visit on 06/02/2022   Component Date Value Ref Range Status    Sodium 06/02/2022 136   136 - 145 mmol/L Final    Potassium 06/02/2022 3.8  3.5 - 5.1 mmol/L Final    Chloride 06/02/2022 99  95 - 110 mmol/L Final    CO2 06/02/2022 26  23 - 29 mmol/L Final    Glucose 06/02/2022 104  70 - 110 mg/dL Final    BUN 06/02/2022 18  8 - 23 mg/dL Final    Creatinine 06/02/2022 1.2  0.5 - 1.4 mg/dL Final    Calcium 06/02/2022 9.7  8.7 - 10.5 mg/dL Final    Total Protein 06/02/2022 7.9  6.0 - 8.4 g/dL Final    Albumin 06/02/2022 4.1  3.5 - 5.2 g/dL Final    Total Bilirubin 06/02/2022 0.8  0.1 - 1.0 mg/dL Final    Alkaline Phosphatase 06/02/2022 50 (L)  55 - 135 U/L Final    AST 06/02/2022 28  10 - 40 U/L Final    ALT 06/02/2022 19  10 - 44 U/L Final    Anion Gap 06/02/2022 11  8 - 16 mmol/L Final    eGFR if African American 06/02/2022 50.0 (A)  >60 mL/min/1.73 m^2 Final    eGFR if non African American 06/02/2022 43.4 (A)  >60 mL/min/1.73 m^2 Final    WBC 06/02/2022 5.45  3.90 - 12.70 K/uL Final    RBC 06/02/2022 4.41  4.00 - 5.40 M/uL Final    Hemoglobin 06/02/2022 13.9  12.0 - 16.0 g/dL Final    Hematocrit 06/02/2022 42.7  37.0 - 48.5 % Final    MCV 06/02/2022 97  82 - 98 fL Final    MCH 06/02/2022 31.5 (H)  27.0 - 31.0 pg Final    MCHC 06/02/2022 32.6  32.0 - 36.0 g/dL Final    RDW 06/02/2022 13.5  11.5 - 14.5 % Final    Platelets 06/02/2022 285  150 - 450 K/uL Final    MPV 06/02/2022 11.6  9.2 - 12.9 fL Final    Immature Granulocytes 06/02/2022 0.4  0.0 - 0.5 % Final    Gran # (ANC) 06/02/2022 2.9  1.8 - 7.7 K/uL Final    Immature Grans (Abs) 06/02/2022 0.02  0.00 - 0.04 K/uL Final    Lymph # 06/02/2022 1.1  1.0 - 4.8 K/uL Final    Mono # 06/02/2022 0.9  0.3 - 1.0 K/uL Final    Eos # 06/02/2022 0.4  0.0 - 0.5 K/uL Final    Baso # 06/02/2022 0.03  0.00 - 0.20 K/uL Final    nRBC 06/02/2022 0  0 /100 WBC Final    Gran % 06/02/2022 53.3  38.0 - 73.0 % Final    Lymph % 06/02/2022 20.9  18.0 - 48.0 % Final    Mono % 06/02/2022 16.9 (H)  4.0 - 15.0 % Final    Eosinophil % 06/02/2022 7.9  0.0 - 8.0 % Final    Basophil  % 06/02/2022 0.6  0.0 - 1.9 % Final    Differential Method 06/02/2022 Automated   Final   Lab Visit on 05/24/2022   Component Date Value Ref Range Status    Lipase 05/24/2022 34  4 - 60 U/L Final    Amylase 05/24/2022 56  20 - 110 U/L Final    Specimen UA 05/24/2022 Urine, Clean Catch   Final    Color, UA 05/24/2022 Yellow  Yellow, Straw, Sarah Final    Appearance, UA 05/24/2022 Clear  Clear Final    pH, UA 05/24/2022 7.0  5.0 - 8.0 Final    Specific Gravity, UA 05/24/2022 1.015  1.005 - 1.030 Final    Protein, UA 05/24/2022 Negative  Negative Final    Glucose, UA 05/24/2022 Negative  Negative Final    Ketones, UA 05/24/2022 Negative  Negative Final    Bilirubin (UA) 05/24/2022 Negative  Negative Final    Occult Blood UA 05/24/2022 Negative  Negative Final    Nitrite, UA 05/24/2022 Negative  Negative Final    Urobilinogen, UA 05/24/2022 Negative  Negative EU/dL Final    Leukocytes, UA 05/24/2022 Negative  Negative Final    Microalbumin, Urine 05/24/2022 2.3  <19.9 ug/mL Final    Creatinine, Urine 05/24/2022 84.0  15.0 - 325.0 mg/dL Final    Microalb/Creat Ratio 05/24/2022 2.7  0.0 - 30.0 ug/mg Final   Office Visit on 05/12/2022   Component Date Value Ref Range Status    Color, UA 05/12/2022 Yellow   Final    pH, UA 05/12/2022 5   Final    WBC, UA 05/12/2022 neg   Final    Nitrite, UA 05/12/2022 neg   Final    Protein, POC 05/12/2022 neg   Final    Glucose, UA 05/12/2022 neg   Final    Ketones, UA 05/12/2022 neg   Final    Urobilinogen, UA 05/12/2022 0.2   Final    Bilirubin, POC 05/12/2022 neg   Final    Blood, UA 05/12/2022 neg   Final    Clarity, UA 05/12/2022 Clear   Final    Spec Grav UA 05/12/2022 1.015   Final   Lab Visit on 04/22/2022   Component Date Value Ref Range Status    Sodium 04/22/2022 137  136 - 145 mmol/L Final    Potassium 04/22/2022 3.9  3.5 - 5.1 mmol/L Final    Chloride 04/22/2022 103  95 - 110 mmol/L Final    CO2 04/22/2022 24  23 - 29 mmol/L Final    Glucose 04/22/2022 114 (H)  70 - 110 mg/dL  Final    BUN 04/22/2022 22  8 - 23 mg/dL Final    Creatinine 04/22/2022 1.0  0.5 - 1.4 mg/dL Final    Calcium 04/22/2022 9.5  8.7 - 10.5 mg/dL Final    Anion Gap 04/22/2022 10  8 - 16 mmol/L Final    eGFR if African American 04/22/2022 >60.0  >60 mL/min/1.73 m^2 Final    eGFR if non African American 04/22/2022 54.1 (A)  >60 mL/min/1.73 m^2 Final   Office Visit on 02/21/2022   Component Date Value Ref Range Status    Glucose 08/18/2022 101 (H)  65 - 99 mg/dL Final    BUN 08/18/2022 16  7 - 25 mg/dL Final    Creatinine 08/18/2022 1.22 (H)  0.60 - 1.00 mg/dL Final    eGFR 08/18/2022 45 (L)  > OR = 60 mL/min/1.73m2 Final    BUN/Creatinine Ratio 08/18/2022 13  6 - 22 (calc) Final    Sodium 08/18/2022 138  135 - 146 mmol/L Final    Potassium 08/18/2022 4.2  3.5 - 5.3 mmol/L Final    Chloride 08/18/2022 103  98 - 110 mmol/L Final    CO2 08/18/2022 24  20 - 32 mmol/L Final    Calcium 08/18/2022 9.6  8.6 - 10.4 mg/dL Final    Total Protein 08/18/2022 7.7  6.1 - 8.1 g/dL Final    Albumin 08/18/2022 4.3  3.6 - 5.1 g/dL Final    Globulin, Total 08/18/2022 3.4  1.9 - 3.7 g/dL (calc) Final    Albumin/Globulin Ratio 08/18/2022 1.3  1.0 - 2.5 (calc) Final    Total Bilirubin 08/18/2022 0.5  0.2 - 1.2 mg/dL Final    Alkaline Phosphatase 08/18/2022 66  37 - 153 U/L Final    AST 08/18/2022 22  10 - 35 U/L Final    ALT 08/18/2022 11  6 - 29 U/L Final    Hemoglobin A1C 08/18/2022 5.9 (H)  <5.7 % of total Hgb Final    WBC 08/18/2022 7.2  3.8 - 10.8 Thousand/uL Final    RBC 08/18/2022 4.66  3.80 - 5.10 Million/uL Final    Hemoglobin 08/18/2022 14.7  11.7 - 15.5 g/dL Final    Hematocrit 08/18/2022 44.6  35.0 - 45.0 % Final    MCV 08/18/2022 95.7  80.0 - 100.0 fL Final    MCH 08/18/2022 31.5  27.0 - 33.0 pg Final    MCHC 08/18/2022 33.0  32.0 - 36.0 g/dL Final    RDW 08/18/2022 12.9  11.0 - 15.0 % Final    Platelets 08/18/2022 278  140 - 400 Thousand/uL Final    MPV 08/18/2022 12.2  7.5 - 12.5 fL Final    Neutrophils, Abs 08/18/2022 4,262   1,500 - 7,800 cells/uL Final    Lymph # 08/18/2022 1,519  850 - 3,900 cells/uL Final    Mono # 08/18/2022 1,015 (H)  200 - 950 cells/uL Final    Eos # 08/18/2022 360  15 - 500 cells/uL Final    Baso # 08/18/2022 43  0 - 200 cells/uL Final    Neutrophils Relative 08/18/2022 59.2  % Final    Lymph % 08/18/2022 21.1  % Final    Mono % 08/18/2022 14.1  % Final    Eosinophil % 08/18/2022 5.0  % Final    Basophil % 08/18/2022 0.6  % Final    Cholesterol 08/18/2022 170  <200 mg/dL Final    HDL 08/18/2022 50  > OR = 50 mg/dL Final    Triglycerides 08/18/2022 165 (H)  <150 mg/dL Final    LDL Cholesterol 08/18/2022 94  mg/dL (calc) Final    HDL/Cholesterol Ratio 08/18/2022 3.4  <5.0 (calc) Final    Non HDL Chol. (LDL+VLDL) 08/18/2022 120  <130 mg/dL (calc) Final    Creatinine, Urine 08/18/2022 126  20 - 275 mg/dL Final    Microalb, Ur 08/18/2022 0.7  See Note: mg/dL Final    Microalb/Creat Ratio 08/18/2022 6  <30 mcg/mg creat Final    WBC Casts, UA 08/18/2022 NONE SEEN  < OR = 5 /HPF Final    RBC Casts, UA 08/18/2022 NONE SEEN  < OR = 2 /HPF Final    Squam Epithel, UA 08/18/2022 6-10 (A)  < OR = 5 /HPF Final    Bacteria, UA 08/18/2022 FEW (A)  NONE SEEN /HPF Final    Hyaline Casts, UA 08/18/2022 NONE SEEN  NONE SEEN /LPF Final    Yeast, UA 08/18/2022 FEW (A)  NONE SEEN /HPF Final    Service Cmt: 08/18/2022    Final   Office Visit on 02/10/2022   Component Date Value Ref Range Status    Color, UA 02/10/2022 Yellow   Final    pH, UA 02/10/2022 5   Final    WBC, UA 02/10/2022 negative   Final    Nitrite, UA 02/10/2022 negative   Final    Protein, POC 02/10/2022 trace   Final    Glucose, UA 02/10/2022 negative   Final    Ketones, UA 02/10/2022 negative   Final    Urobilinogen, UA 02/10/2022 normal   Final    Bilirubin, POC 02/10/2022 2+   Final    Blood, UA 02/10/2022 negative   Final    Clarity, UA 02/10/2022 Slightly Cloudy   Final    Spec Grav UA 02/10/2022 1.020   Final   There may be more visits with results that are not  included.       Past Medical History:   Diagnosis Date    Back pain     Cystitis, interstitial     Hypertension     Interstitial cystitis 1998    Pneumonia     viral and bacterial     Rosacea     Uncontrolled type 2 diabetes mellitus without complication, without long-term current use of insulin 1/22/2018    Wears glasses     Wears partial dentures     upper     Past Surgical History:   Procedure Laterality Date    CHOLECYSTECTOMY  02/21/2018    CYSTOSCOPY      D & C      DILATION AND CURETTAGE OF UTERUS      disk repair      L5 S1    ENDOSCOPIC ULTRASOUND OF UPPER GASTROINTESTINAL TRACT N/A 6/7/2022    Procedure: ULTRASOUND, UPPER GI TRACT, ENDOSCOPIC;  Surgeon: Alen Kendrick III, MD;  Location: [facility];  Service: Endoscopy;  Laterality: N/A;    EYE SURGERY      bilat cataract     Family History   Problem Relation Age of Onset    Cancer Father     Cancer Sister     Cancer Brother     Cancer Brother        Marital Status:   Alcohol History:  reports current alcohol use.  Tobacco History:  reports that she quit smoking about 29 years ago. Her smoking use included cigarettes. She has a 32.00 pack-year smoking history. She has never used smokeless tobacco.  Drug History:  reports no history of drug use.    Review of patient's allergies indicates:  No Known Allergies    Current Outpatient Medications:     alendronate (FOSAMAX) 70 MG tablet, TAKE 1 TABLET EVERY 7 DAYS, Disp: 12 tablet, Rfl: 4    amitriptyline (ELAVIL) 25 MG tablet, Take 1 tablet (25 mg total) by mouth 3 (three) times daily., Disp: 270 tablet, Rfl: 1    brimonidine (MIRVASO) 0.33 % Gel, Apply 1 application topically every morning. To red areas on face, Disp: 90 g, Rfl: 3    cetirizine (ZYRTEC) 10 MG tablet, Take 1 tablet (10 mg total) by mouth once daily., Disp: 90 tablet, Rfl: 2    estradioL (ESTRING) 2 mg (7.5 mcg /24 hour) vaginal ring, Place 2 mg vaginally every 3 (three) months. for 90 doses, Disp: 1 each, Rfl: 3    ezetimibe (ZETIA)  10 mg tablet, Take 1 tablet (10 mg total) by mouth every evening., Disp: 90 tablet, Rfl: 1    glucosamine-chondroit-vit C-Mn 500-400 mg capsule, , Disp: , Rfl:     hydrocortisone 2.5 % cream, aaa once every other day alternating with triamcinolone, Disp: 30 g, Rfl: 1    hyoscyamine (OSCIMIN SL) 0.125 mg Subl, DISSOLVE 1 TABLET UNDER THE TONGUE THREE TIMES A DAY AS NEEDED, Disp: 270 tablet, Rfl: 1    nystatin-triamcinolone (MYCOLOG II) cream, Apply topically 4 (four) times daily. for 10 days, Disp: 60 g, Rfl: 1    pantoprazole (PROTONIX) 40 MG tablet, Take 1 tablet (40 mg total) by mouth once daily., Disp: 90 tablet, Rfl: 1    potassium chloride (KLOR-CON) 10 MEQ TbSR, Take 10 mEq by mouth once daily., Disp: , Rfl:     sucralfate (CARAFATE) 1 gram tablet, Take 1 tablet (1 g total) by mouth 4 (four) times daily., Disp: 360 tablet, Rfl: 1    timolol maleate 0.5% (TIMOPTIC) 0.5 % Drop, Place 1 drop into both eyes 2 (two) times daily., Disp: , Rfl:     valsartan (DIOVAN) 160 MG tablet, Take 1 tablet (160 mg total) by mouth once daily., Disp: 90 tablet, Rfl: 1    ciprofloxacin HCl (CIPRO) 500 MG tablet, Take 1 tablet (500 mg total) by mouth 2 (two) times daily. (Patient not taking: Reported on 1/26/2023), Disp: 20 tablet, Rfl: 0    docusate sodium (COLACE) 100 MG capsule, , Disp: , Rfl:     Review of Systems   Constitutional:  Negative for appetite change, chills, diaphoresis, fatigue, fever and unexpected weight change.   HENT:  Negative for congestion, ear pain, hearing loss, nosebleeds, postnasal drip, sinus pressure, sinus pain, sneezing, sore throat, tinnitus, trouble swallowing and voice change.    Eyes:  Negative for photophobia, pain, itching and visual disturbance.   Respiratory:  Negative for apnea, cough, chest tightness, shortness of breath, wheezing and stridor.    Cardiovascular:  Negative for chest pain, palpitations and leg swelling.   Gastrointestinal:  Negative for abdominal distention, abdominal pain,  blood in stool, constipation, diarrhea, nausea and vomiting.        Still on protonix and carafate   Endocrine: Negative for cold intolerance, heat intolerance, polydipsia and polyuria.   Genitourinary:  Positive for frequency. Negative for difficulty urinating, dyspareunia, dysuria, flank pain, hematuria, menstrual problem, pelvic pain, urgency, vaginal discharge and vaginal pain.        HPI   Musculoskeletal:  Positive for arthralgias (knees). Negative for back pain, joint swelling, myalgias, neck pain and neck stiffness.   Skin:  Negative for pallor.   Allergic/Immunologic: Negative for environmental allergies and food allergies.   Neurological:  Negative for dizziness, tremors, speech difficulty, weakness, light-headedness and numbness.   Hematological:  Does not bruise/bleed easily.   Psychiatric/Behavioral:  Negative for agitation, confusion, decreased concentration, sleep disturbance and suicidal ideas. The patient is not nervous/anxious.         Objective:      Vitals    Vitals - 1 value per visit 12/8/2022 12/8/2022 12/8/2022 1/26/2023 1/26/2023   SYSTOLIC - 132 132 - 134   DIASTOLIC - 68 70 - 86   Pulse - 68 - - 74   Temp - 98.6 - - -   Resp - 16 - - 18   SPO2 - 97 - - 97   Weight (lb) - 260 - - 255   Weight (kg) - 117.935 - - 115.667   Height - 63 - - 63   BMI (Calculated) - 46.1 - - 45.2   VISIT REPORT - - - - -   Pain Score  0 - - 0 -   Some recent data might be hidden       Physical Exam  Vitals and nursing note reviewed.   Constitutional:       General: She is not in acute distress.     Appearance: She is obese. She is not ill-appearing.   HENT:      Head: Normocephalic and atraumatic.   Eyes:      Extraocular Movements: Extraocular movements intact.      Pupils: Pupils are equal, round, and reactive to light.   Neck:      Vascular: No carotid bruit.   Cardiovascular:      Rate and Rhythm: Normal rate and regular rhythm.      Pulses: Normal pulses.      Heart sounds: Normal heart sounds. No murmur  heard.    No friction rub.   Pulmonary:      Effort: Pulmonary effort is normal.      Breath sounds: Normal breath sounds.   Abdominal:      General: Bowel sounds are normal.      Palpations: Abdomen is soft.   Musculoskeletal:      Cervical back: No rigidity or tenderness.      Right lower leg: No edema.      Left lower leg: No edema.   Lymphadenopathy:      Cervical: No cervical adenopathy.   Skin:     General: Skin is warm and dry.      Capillary Refill: Capillary refill takes less than 2 seconds.      Findings: No lesion or rash.      Comments: Face flushed   Neurological:      General: No focal deficit present.      Mental Status: She is alert and oriented to person, place, and time.   Psychiatric:         Mood and Affect: Mood normal.         Thought Content: Thought content normal.         Judgment: Judgment normal.         Assessment:       1. UTI due to Klebsiella species    2. Interstitial cystitis    3. Primary hypertension    4. GERD without esophagitis    5. Type 2 diabetes mellitus without complication, without long-term current use of insulin         Plan:       UTI due to Klebsiella species  -     Urine culture; Future; Expected date: 01/26/2023    Interstitial cystitis    Primary hypertension        -     stable    GERD without esophagitis        -      continue protonix and carafate    Type 2 diabetes mellitus without complication, without long-term current use of insulin        -      continue as is-check labs regularly      Follow up in about 3 months (around 4/26/2023).        1/29/2023 Elda Paredes M.D.

## 2023-02-16 NOTE — PROGRESS NOTES
Subjective:       Patient ID: Fransisca Frazier is a 77 y.o. female.    Chief Complaint: estring    HPI  Fransisca Frazier is a 77 y.o. female.  Presents today for routine pessary maintenance.  She was last seen in our office on 11/16/2022.  She went to see her primary care in the beginning of December.  She had a positive urine culture at that time with Citrobacter youngae.  She was treated with Cipro and felt that she improved.  Dr. Paredes did place standing urine culture orders at the lab for her.  She has not felt that she needed to get a urine culture since that last visit with Dr. Paredes.  She does at times have some frequency and urgency but overall feels that she is doing fairly well.  She denies any vaginal complaints concerns at this time as ready to proceed with the pessary    Review of Systems   Constitutional:  Negative for activity change, fever and unexpected weight change.   HENT:  Negative for hearing loss.    Eyes:  Negative for visual disturbance.   Respiratory:  Negative for shortness of breath and wheezing.    Cardiovascular:  Negative for chest pain, palpitations and leg swelling.   Gastrointestinal:  Negative for abdominal pain, constipation and diarrhea.   Genitourinary:  Positive for frequency and urgency. Negative for dyspareunia, dysuria, vaginal bleeding and vaginal discharge.   Musculoskeletal:  Negative for gait problem and neck pain.   Skin:  Negative for rash and wound.   Allergic/Immunologic: Negative for immunocompromised state.   Neurological:  Negative for tremors, speech difficulty and weakness.   Hematological:  Does not bruise/bleed easily.   Psychiatric/Behavioral:  Negative for agitation and confusion.      Objective:      Physical Exam  Vitals reviewed. Exam conducted with a chaperone present.   Constitutional:       General: She is not in acute distress.     Appearance: She is well-developed.   HENT:      Head: Normocephalic and atraumatic.   Neck:      Thyroid: No thyromegaly.    Pulmonary:      Effort: Pulmonary effort is normal. No respiratory distress.   Abdominal:      Palpations: Abdomen is soft.      Tenderness: There is no abdominal tenderness.      Hernia: No hernia is present.   Musculoskeletal:         General: Normal range of motion.      Cervical back: Normal range of motion.   Skin:     General: Skin is warm and dry.      Findings: No rash.   Neurological:      Mental Status: She is alert and oriented to person, place, and time.   Psychiatric:         Mood and Affect: Mood normal.         Behavior: Behavior normal.         Thought Content: Thought content normal.     Pelvic Exam:  V: No lesions. No palpable nodes.   Va:  No discharge or bleeding.  Good length dominant midline cystocele BA =0  Meatus:No caruncle or stenosis  Urethra: Non tender. No suburethral masses.  Cx/Cuff: Normal   Uterus:  Small nontender  Ad: No mass or tenderness.  Levators :Symmetrical. Normal tone. Non tender.  BL: Non tender  RV: No hemorrhoids.      Assessment:       1. Urinary frequency    2. Voiding dysfunction    3. Cystocele, midline    4. Pessary maintenance    5. Atrophic vaginitis    6. History of recurrent UTIs            Procedure note- estring pessary removed and cleaned with soap and water.  After betadine irrigation of the vagina, Estring pessary was reinserted into the vagina.  Pt ambulated in the room and denies any discomfort.  NP reminded pt that the first 24-48 hours are the most likely time for the pessary to fall out and to monitor the toilet before flushing.  Pt verbalized understanding.      Plan:       Urinary frequency-her UA today is negative monitor  -     POCT Urinalysis(Instrument)    Voiding dysfunction-monitor    Cystocele, midline-continue with Estring  -     estradioL (ESTRING) 2 mg (7.5 mcg /24 hour) vaginal ring; Place 2 mg vaginally every 3 (three) months. for 90 doses  Dispense: 1 each; Refill: 3    Pessary maintenance-continue with Estring    Atrophic vaginitis  -      estradioL (ESTRING) 2 mg (7.5 mcg /24 hour) vaginal ring; Place 2 mg vaginally every 3 (three) months. for 90 doses  Dispense: 1 each; Refill: 3    History of recurrent UTIs-continue with Estring  -     estradioL (ESTRING) 2 mg (7.5 mcg /24 hour) vaginal ring; Place 2 mg vaginally every 3 (three) months. for 90 doses  Dispense: 1 each; Refill: 3      RTC 3 months

## 2023-05-12 NOTE — ED PROVIDER NOTES
Encounter Date: 5/12/2023       History     Chief Complaint   Patient presents with    Cardiac Arrest     Patient presents emergency department with reported cardiac arrest per EMS patient found down by paramedics pulseless apneic PEA on monitor  reports that patient had an syncopal event 2 days ago been feeling poorly ever since today she was short of breath and weak fatigued when she attempted to get out of bed she fell to the ground  attempted to keep her from falling but was unable he did assist her to the ground she does not believes that she injured herself he states that her respirations became more labored and then gasping she is he is reports that she was breathing up until the the arrival of the paramedics when she stopped EMS initiated ACLS administered 7 rounds of epinephrine prior to arrival emergency department without response she was also given Narcan no other drugs administered PTA      Review of patient's allergies indicates:  No Known Allergies  Past Medical History:   Diagnosis Date    Back pain     Cystitis, interstitial     Hypertension     Interstitial cystitis 1998    Pneumonia     viral and bacterial     Rosacea     Uncontrolled type 2 diabetes mellitus without complication, without long-term current use of insulin 1/22/2018    Wears glasses     Wears partial dentures     upper     Past Surgical History:   Procedure Laterality Date    CHOLECYSTECTOMY  02/21/2018    CYSTOSCOPY      D & C      DILATION AND CURETTAGE OF UTERUS      disk repair      L5 S1    ENDOSCOPIC ULTRASOUND OF UPPER GASTROINTESTINAL TRACT N/A 6/7/2022    Procedure: ULTRASOUND, UPPER GI TRACT, ENDOSCOPIC;  Surgeon: Alen Kendrick III, MD;  Location: [facility];  Service: Endoscopy;  Laterality: N/A;    EYE SURGERY      bilat cataract     Family History   Problem Relation Age of Onset    Cancer Father     Cancer Sister     Cancer Brother     Cancer Brother      Social History     Tobacco Use    Smoking  status: Former     Packs/day: 1.00     Years: 32.00     Pack years: 32.00     Types: Cigarettes     Quit date: 1993     Years since quittin.7    Smokeless tobacco: Never   Substance Use Topics    Alcohol use: Yes     Comment: occasional  2018    Drug use: No     Review of Systems   Unable to perform ROS: Patient unresponsive     Physical Exam     Initial Vitals   BP Pulse Resp Temp SpO2   23 1807 23 1758 23 1805 -- --   (!) 148/61 98 (!) 101        MAP       --                Physical Exam    Constitutional: She appears well-developed and well-nourished. She is Obese . She appears distressed. She is intubated.   HENT:   Head: Normocephalic and atraumatic.   Right Ear: External ear normal.   Left Ear: External ear normal.   Mouth/Throat: Oropharynx is clear and moist.   Eyes:   Pupils mid range fixed   Neck: No JVD present.   Cardiovascular:            PE a no pulse   Pulmonary/Chest: She is intubated.   Intubated equal breath sounds with bag-valve-mask   Abdominal: Abdomen is soft.   Obese abdomen   Musculoskeletal:         General: No edema.     Neurological: She is unresponsive.   Unresponsive   Skin: There is pallor.   Plethoric       ED Course   Procedures  Labs Reviewed   POCT GLUCOSE - Abnormal; Notable for the following components:       Result Value    POC Glucose 121 (*)     All other components within normal limits   ISTAT PROCEDURE - Abnormal; Notable for the following components:    POC PH 7.129 (*)     POC PCO2 58.3 (*)     POC PO2 112 (*)     POC HCO3 19.3 (*)     POC Glucose 268 (*)     POC TCO2 21 (*)     POC Ionized Calcium 1.80 (*)     All other components within normal limits   CBC W/ AUTO DIFFERENTIAL   COMPREHENSIVE METABOLIC PANEL          Imaging Results    None          Medications   sodium bicarbonate 8.4 % (1 mEq/mL) injection (has no administration in time range)   EPINEPHrine 0.1 mg/mL injection (1 mg Intravenous Given 23)   sodium bicarbonate  8.4 % (1 mEq/mL) injection (50 mEq Intravenous Given 5/12/23 1820)   sodium chloride 0.9% bolus (1,000 mLs Intravenous New Bag 5/12/23 1741)   calcium chloride 100 mg/mL (10 %) injection (1 g Intravenous Given 5/12/23 1753)   NORepinephrine 4 mg in dextrose 5 % (D5W) 250 mL infusion (0.3 mcg/kg/min × 115.7 kg Intravenous Rate/Dose Change 5/12/23 1811)     Medical Decision Making:   ED Management:  Patient intubated prior to arrival ACLS continued IV was established in the arms bilaterally further administration drugs in the emergency department via IV patient did return spontaneous circulation after administration of epinephrine calcium chloride and bicarb further CPR was necessary as the heart rate would become bradycardic and blood pressure would drop following resuscitation she was placed on Levophed and IV epi drip given via midline established in the left arm patient's  arrived in the emergency department and on further discussion he reported patient would not want further resuscitative efforts she did not want to be on a ventilator for prolonged period of time she did not wish CPR he asked that we perform no further resuscitative measures we were able to allow  to stay with patient that despite titrating the Levophed and epinephrine patient became asystolic and pulseless and she was pronounced at 6:46 p.m.  is alone he reports his children are in [place] and [facility] we offered repeatedly to attempt to contact family to assist him at this difficult time but he declined we have notify 's office          Attending Attestation:         Attending Critical Care:   Critical Care Times:   Direct Patient Care (initial evaluation, reassessments, and time considering the case)................................................................40 minutes.    Documentation..................................................................................................................................................................................10 minutes.   ==============================================================  Total Critical Care Time - exclusive of procedural time: 50 minutes.  ==============================================================                     Clinical Impression:   Final diagnoses:  [I46.9] Cardiopulmonary arrest (Primary)        ED Disposition Condition                     Dave Cook MD  [phone]

## 2023-05-13 NOTE — ED NOTES
Bindu Denney, Investigator with 's office arrive. Cleared to be released to [facility] (per 's request).  # ST- [id number].

## 2023-05-13 NOTE — PLAN OF CARE
05/12/23 1720   Patient Assessment/Suction   Level of Consciousness (AVPU) unresponsive   Respiratory Effort Other (Comment)  (Ambu)   Rhythm/Pattern, Respiratory apneic   Code Blue/Rapid Response   Respiratory Therapist Present Michelle Zhong, RRT   Respiratory Therapist Present Nevaeh Daly   Member Arrival Time 1720   Member Departure Time 1855   Rapid Reponse Initiated now   Equipment Used Resuscitation Bag with Mask   $ Chest Compressions Performed? Performed Inpatient;Tech Time 1 hr and 30 min;Performed ED   Existing Airway Placement Verified Yes   Intubation Confirmation Auscultation   Code Blue Comments No pulse Casey in place

## (undated) DEVICE — TROCAR ENDOPATH XCEL 12X100MM

## (undated) DEVICE — TROCAR ENDOPATH XCEL 5X100MM

## (undated) DEVICE — KIT ANTIFOG

## (undated) DEVICE — SEE MEDLINE ITEM 152680

## (undated) DEVICE — COVER SURG LIGHT HANDLE

## (undated) DEVICE — GAUZE SPONGE BULKEE 6X6.75IN

## (undated) DEVICE — GLOVE SURG ULTRA TOUCH 6

## (undated) DEVICE — SEE MEDLINE ITEM 152622

## (undated) DEVICE — CANNULA ENDOPATH XCEL 5X100MM

## (undated) DEVICE — SUT MONOCRYL 4-0 PS-2

## (undated) DEVICE — ALCOHOL 70% ISOP RUBBING 4OZ

## (undated) DEVICE — PACK CUSTOM ENDO CHOLO SLI

## (undated) DEVICE — NDL SPINAL SPINOCAN 22GX3.5

## (undated) DEVICE — BAG TISS RETRV MONARCH 10MM

## (undated) DEVICE — ELECTRODE REM PLYHSV RETURN 9

## (undated) DEVICE — CLIP ENDO LIGATION LARGE CLIPS

## (undated) DEVICE — SLEEVE SCD EXPRESS CALF MEDIUM

## (undated) DEVICE — TROCAR KII BLLN 12MM 10CM

## (undated) DEVICE — DISSECTOR 5MM ENDOPATH

## (undated) DEVICE — UNDERGLOVE BIOGEL PI SZ 6.5 LF

## (undated) DEVICE — BANDAGE ADHESIVE

## (undated) DEVICE — HEMOSTAT SURGICEL 2X14IN

## (undated) DEVICE — SUT 0 VICRYL / UR6 (J603)

## (undated) DEVICE — SCISSOR CURVED ENDOPATH 5MM

## (undated) DEVICE — IRRIGATOR ENDOSCOPY DISP.

## (undated) DEVICE — APPLICATOR CHLORAPREP ORN 26ML

## (undated) DEVICE — SEE MEDLINE ITEM 152741

## (undated) DEVICE — CLOSURE SKIN STERI STRIP 1/2X4

## (undated) DEVICE — SOL IRR NACL .9% 3000ML